# Patient Record
Sex: FEMALE | Race: WHITE | NOT HISPANIC OR LATINO | Employment: FULL TIME | URBAN - METROPOLITAN AREA
[De-identification: names, ages, dates, MRNs, and addresses within clinical notes are randomized per-mention and may not be internally consistent; named-entity substitution may affect disease eponyms.]

---

## 2017-05-15 ENCOUNTER — OFFICE VISIT (OUTPATIENT)
Dept: FAMILY MEDICINE | Facility: CLINIC | Age: 27
End: 2017-05-15
Payer: COMMERCIAL

## 2017-05-15 VITALS
DIASTOLIC BLOOD PRESSURE: 87 MMHG | HEART RATE: 70 BPM | TEMPERATURE: 98 F | WEIGHT: 162.6 LBS | OXYGEN SATURATION: 97 % | BODY MASS INDEX: 24.08 KG/M2 | HEIGHT: 69 IN | SYSTOLIC BLOOD PRESSURE: 132 MMHG

## 2017-05-15 DIAGNOSIS — Z00.00 ROUTINE GENERAL MEDICAL EXAMINATION AT A HEALTH CARE FACILITY: Primary | ICD-10-CM

## 2017-05-15 DIAGNOSIS — Z23 NEED FOR VACCINATION: ICD-10-CM

## 2017-05-15 DIAGNOSIS — Z11.3 SCREENING EXAMINATION FOR VENEREAL DISEASE: ICD-10-CM

## 2017-05-15 DIAGNOSIS — Z12.4 SCREENING FOR MALIGNANT NEOPLASM OF CERVIX: ICD-10-CM

## 2017-05-15 DIAGNOSIS — Z13.1 SCREENING FOR DIABETES MELLITUS: ICD-10-CM

## 2017-05-15 DIAGNOSIS — Z13.220 SCREENING FOR LIPID DISORDERS: ICD-10-CM

## 2017-05-15 DIAGNOSIS — Z30.09 FAMILY PLANNING: ICD-10-CM

## 2017-05-15 LAB
CHOLEST SERPL-MCNC: 211 MG/DL
GLUCOSE SERPL-MCNC: 95 MG/DL (ref 70–99)
HDLC SERPL-MCNC: 84 MG/DL
LDLC SERPL CALC-MCNC: 101 MG/DL
NONHDLC SERPL-MCNC: 127 MG/DL
TRIGL SERPL-MCNC: 132 MG/DL

## 2017-05-15 PROCEDURE — 87491 CHLMYD TRACH DNA AMP PROBE: CPT | Performed by: FAMILY MEDICINE

## 2017-05-15 PROCEDURE — 82947 ASSAY GLUCOSE BLOOD QUANT: CPT | Performed by: FAMILY MEDICINE

## 2017-05-15 PROCEDURE — 36415 COLL VENOUS BLD VENIPUNCTURE: CPT | Performed by: FAMILY MEDICINE

## 2017-05-15 PROCEDURE — 80061 LIPID PANEL: CPT | Performed by: FAMILY MEDICINE

## 2017-05-15 PROCEDURE — 99385 PREV VISIT NEW AGE 18-39: CPT | Performed by: FAMILY MEDICINE

## 2017-05-15 PROCEDURE — 87591 N.GONORRHOEAE DNA AMP PROB: CPT | Performed by: FAMILY MEDICINE

## 2017-05-15 RX ORDER — DROSPIRENONE AND ETHINYL ESTRADIOL 0.02-3(28)
KIT ORAL
COMMUNITY
Start: 2017-02-21 | End: 2017-05-15

## 2017-05-15 RX ORDER — DROSPIRENONE AND ETHINYL ESTRADIOL 0.02-3(28)
1 KIT ORAL DAILY
Qty: 90 TABLET | Refills: 3 | Status: SHIPPED | OUTPATIENT
Start: 2017-05-15 | End: 2018-04-24

## 2017-05-15 NOTE — PROGRESS NOTES
SUBJECTIVE:     CC: Beata Calderon is an 26 year old woman who presents for preventive health visit.     Healthy Habits:    Do you get at least three servings of calcium containing foods daily (dairy, green leafy vegetables, etc.)? yes    Amount of exercise or daily activities, outside of work: 3-4 day(s) per week    Problems taking medications regularly No    Medication side effects: No    Have you had an eye exam in the past two years? yes    Do you see a dentist twice per year? yes  Do you have sleep apnea, excessive snoring or daytime drowsiness?no    Refill birth control  Fasting for labs    Today's PHQ-2 Score:   PHQ-2 ( 1999 Pfizer) 5/15/2017   Q1: Little interest or pleasure in doing things 0   Q2: Feeling down, depressed or hopeless 0   PHQ-2 Score 0       Abuse: Current or Past(Physical, Sexual or Emotional)- No  Do you feel safe in your environment - Yes    Social History   Substance Use Topics     Smoking status: Never Smoker     Smokeless tobacco: Never Used     Alcohol use 3.0 oz/week     5 Cans of beer per week     The patient does not drink >3 drinks per day nor >7 drinks per week.    No results for input(s): CHOL, HDL, LDL, TRIG, CHOLHDLRATIO, NHDL in the last 53406 hours.    Reviewed orders with patient.  Reviewed health maintenance and updated orders accordingly - Yes    Mammo Decision Support:  Mammogram not appropriate for this patient based on age.    Pertinent mammograms are reviewed under the imaging tab.  History of abnormal Pap smear: NO - age 21-29 PAP every 3 years recommended    Reviewed and updated as needed this visit by clinical staff  Tobacco  Meds  Soc Hx        Reviewed and updated as needed this visit by Provider            ROS:  C: NEGATIVE for fever, chills, change in weight  I: NEGATIVE for worrisome rashes, moles or lesions  E: NEGATIVE for vision changes or irritation  ENT: NEGATIVE for ear, mouth and throat problems  R: NEGATIVE for significant cough or SOB  B: NEGATIVE for  "masses, tenderness or discharge  CV: NEGATIVE for chest pain, palpitations or peripheral edema  GI: NEGATIVE for nausea, abdominal pain, heartburn, or change in bowel habits  : NEGATIVE for unusual urinary or vaginal symptoms. Periods are regular.  M: NEGATIVE for significant arthralgias or myalgia  N: NEGATIVE for weakness, dizziness or paresthesias  P: NEGATIVE for changes in mood or affect    Problem list, Medication list, Allergies, and Medical/Social/Surgical histories reviewed in [facility] and updated as appropriate.  OBJECTIVE:     /87  Pulse 70  Temp 98  F (36.7  C) (Oral)  Ht 5' 9\" (1.753 m)  Wt 162 lb 9.6 oz (73.8 kg)  LMP 05/10/2017  SpO2 97%  BMI 24.01 kg/m2  EXAM:  GENERAL: healthy, alert and no distress  EYES: Eyes grossly normal to inspection, PERRL and conjunctivae and sclerae normal  HENT: ear canals and TM's normal, nose and mouth without ulcers or lesions  NECK: no adenopathy, no asymmetry, masses, or scars and thyroid normal to palpation  RESP: lungs clear to auscultation - no rales, rhonchi or wheezes  BREAST: normal without masses, tenderness or nipple discharge and no palpable axillary masses or adenopathy  CV: regular rate and rhythm, normal S1 S2, no S3 or S4, no murmur, click or rub, no peripheral edema and peripheral pulses strong  ABDOMEN: soft, nontender, no hepatosplenomegaly, no masses and bowel sounds normal   (female): normal female external genitalia, normal urethral meatus, vaginal mucosa pink, moist, well rugated, and normal cervix/adnexa/uterus without masses or discharge  MS: no gross musculoskeletal defects noted, no edema  SKIN: no suspicious lesions or rashes  NEURO: Normal strength and tone, mentation intact and speech normal  PSYCH: mentation appears normal, affect normal/bright    ASSESSMENT/PLAN:     (Z00.00) Routine general medical examination at a health care facility  (primary encounter diagnosis)  Comment: Plan: fasting for labs, fasting glucose , fasting " "lipid   Please see patient instructions     (Z11.3) Screening examination for venereal disease  Comment: Plan: NEISSERIA GONORRHOEA PCR, CHLAMYDIA TRACHOMATIS        PCR            (Z12.4) Screening for malignant neoplasm of cervix  Comment: Plan: - [facility]IL-pap at [facility], 04/2015    (Z30.09) Family planning  Comment: Plan: oral contraceptive pills refilled    (Z23) Need for vaccination  Comment: Plan: reviewed at [facility]    COUNSELING:   Reviewed preventive health counseling, as reflected in patient instructions       Regular exercise    BP Screening:   Last 3 BP Readings:    BP Readings from Last 3 Encounters:   05/15/17 132/87       The following was recommended to the patient:  Re-screen BP within a year and recommended lifestyle modifications     reports that she has never smoked. She has never used smokeless tobacco.    Estimated body mass index is 24.01 kg/(m^2) as calculated from the following:    Height as of this encounter: 5' 9\" (1.753 m).    Weight as of this encounter: 162 lb 9.6 oz (73.8 kg).       Counseling Resources:  ATP IV Guidelines  Pooled Cohorts Equation Calculator  Breast Cancer Risk Calculator  FRAX Risk Assessment  ICSI Preventive Guidelines  Dietary Guidelines for Americans, 2010  USDA's MyPlate  ASA Prophylaxis  Lung CA Screening    Serina Munguia MD  [facility]  "

## 2017-05-15 NOTE — NURSING NOTE
"Chief Complaint   Patient presents with     Physical     fasting     /87  Pulse 70  Temp 98  F (36.7  C) (Oral)  Ht 5' 9\" (1.753 m)  Wt 162 lb 9.6 oz (73.8 kg)  LMP 05/10/2017  SpO2 97%  BMI 24.01 kg/m2 Estimated body mass index is 24.01 kg/(m^2) as calculated from the following:    Height as of this encounter: 5' 9\" (1.753 m).    Weight as of this encounter: 162 lb 9.6 oz (73.8 kg).  BP completed using cuff size: regular       Health Maintenance due pending provider review:  Pap Smear    PAP--Possibly completing today, per Provider review      Humberto Salgado CMA  "

## 2017-05-15 NOTE — PATIENT INSTRUCTIONS
-Normal is 119/79 or lower  -Blood pressure between 120-139/80-89 (prehypertensive blood pressure)   -Hypertensive is 140/90 or above and needs treatment with medication.  -Recheck  Blood pressure periodically,  if More than 140/90 Return visit with MD.   -to reach goal normal Blood pressure , follow:  -Progressive daily aerobic exercise program, most days of the week  -Follow a low fat, low cholesterol diet, attempt to lose weight    -Reduce salt in diet and cooking.  -Reduce coffee to 1-2 cups a day, less is more            Preventive Health Recommendations  Female Ages 18 to 25     Yearly exam:     See your health care provider every year in order to  o Review health changes.   o Discuss preventive care.    o Review your medicines if your doctor has prescribed any.      You should be tested each year for STDs (sexually transmitted diseases).       After age 20, talk to your provider about how often you should have cholesterol testing.      Starting at age 21, get a Pap test every three years. If you have an abnormal result, your doctor may have you test more often.      If you are at risk for diabetes, you should have a diabetes test (fasting glucose).     Shots:     Get a flu shot each year.     Get a tetanus shot every 10 years.     Consider getting the shot (vaccine) that prevents cervical cancer (Gardasil).    Nutrition:     Eat at least 5 servings of fruits and vegetables each day.    Eat whole-grain bread, whole-wheat pasta and brown rice instead of white grains and rice.    Talk to your provider about Calcium and Vitamin D.     Lifestyle    Exercise at least 150 minutes a week each week (30 minutes a day, 5 days a week). This will help you control your weight and prevent disease.    Limit alcohol to one drink per day.    No smoking.     Wear sunscreen to prevent skin cancer.    See your dentist every six months for an exam and cleaning.

## 2017-05-15 NOTE — MR AVS SNAPSHOT
After Visit Summary   5/15/2017    Beata Calderon    MRN: [id number]           Patient Information     Date Of Birth          1990        Visit Information        Provider Department      5/15/2017 8:30 AM Serina Munguia MD [facility]        Today's Diagnoses     Routine general medical examination at a health care facility    -  1    Screening examination for venereal disease        Screening for malignant neoplasm of cervix        Family planning        Need for vaccination          Care Instructions      -Normal is 119/79 or lower  -Blood pressure between 120-139/80-89 (prehypertensive blood pressure)   -Hypertensive is 140/90 or above and needs treatment with medication.  -Recheck  Blood pressure periodically,  if More than 140/90 Return visit with MD.   -to reach goal normal Blood pressure , follow:  -Progressive daily aerobic exercise program, most days of the week  -Follow a low fat, low cholesterol diet, attempt to lose weight    -Reduce salt in diet and cooking.  -Reduce coffee to 1-2 cups a day, less is more            Preventive Health Recommendations  Female Ages 18 to 25     Yearly exam:     See your health care provider every year in order to  o Review health changes.   o Discuss preventive care.    o Review your medicines if your doctor has prescribed any.      You should be tested each year for STDs (sexually transmitted diseases).       After age 20, talk to your provider about how often you should have cholesterol testing.      Starting at age 21, get a Pap test every three years. If you have an abnormal result, your doctor may have you test more often.      If you are at risk for diabetes, you should have a diabetes test (fasting glucose).     Shots:     Get a flu shot each year.     Get a tetanus shot every 10 years.     Consider getting the shot (vaccine) that prevents cervical cancer (Gardasil).    Nutrition:     Eat at least 5 servings of fruits and vegetables  "each day.    Eat whole-grain bread, whole-wheat pasta and brown rice instead of white grains and rice.    Talk to your provider about Calcium and Vitamin D.     Lifestyle    Exercise at least 150 minutes a week each week (30 minutes a day, 5 days a week). This will help you control your weight and prevent disease.    Limit alcohol to one drink per day.    No smoking.     Wear sunscreen to prevent skin cancer.    See your dentist every six months for an exam and cleaning.        Follow-ups after your visit        Follow-up notes from your care team     Return in about 1 year (around 5/15/2018).      Who to contact     If you have questions or need follow up information about today's clinic visit or your schedule please contact [facility] directly at [phone].  Normal or non-critical lab and imaging results will be communicated to you by MyChart, letter or phone within 4 business days after the clinic has received the results. If you do not hear from us within 7 days, please contact the clinic through Mobivoxhart or phone. If you have a critical or abnormal lab result, we will notify you by phone as soon as possible.  Submit refill requests through [employer] or call your pharmacy and they will forward the refill request to us. Please allow 3 business days for your refill to be completed.          Additional Information About Your Visit        [employer] Information     WorldRemit lets you send messages to your doctor, view your test results, renew your prescriptions, schedule appointments and more. To sign up, go to [email or web address] . Click on \"Log in\" on the left side of the screen, which will take you to the Welcome page. Then click on \"Sign up Now\" on the right side of the page.     You will be asked to enter the access code listed below, as well as some personal information. Please follow the directions to create your username and password.     Your access code is: [email or web address]  Expires: 8/13/2017  8:58 " "AM     Your access code will  in 90 days. If you need help or a new code, please call your [facility] clinic or [phone].        Care EveryWhere ID     This is your Care EveryWhere ID. This could be used by other organizations to access your [facility] medical records  [id number]        Your Vitals Were     Pulse Temperature Height Last Period Pulse Oximetry BMI (Body Mass Index)    70 98  F (36.7  C) (Oral) 5' 9\" (1.753 m) 05/10/2017 97% 24.01 kg/m2       Blood Pressure from Last 3 Encounters:   05/15/17 132/87    Weight from Last 3 Encounters:   05/15/17 162 lb 9.6 oz (73.8 kg)              We Performed the Following     CHLAMYDIA TRACHOMATIS PCR     NEISSERIA GONORRHOEA PCR          Today's Medication Changes          These changes are accurate as of: 5/15/17  8:58 AM.  If you have any questions, ask your nurse or doctor.               These medicines have changed or have updated prescriptions.        Dose/Directions    KINDRA 3-0.02 MG per tablet   This may have changed:    - how much to take  - how to take this  - when to take this   Used for:  Family planning   Generic drug:  drospirenone-ethinyl estradiol   Changed by:  Serina Munguia MD        Dose:  1 tablet   Take 1 tablet by mouth daily   Quantity:  90 tablet   Refills:  3            Where to get your medicines      These medications were sent to [facility] [id number] [place] [address] & [address]  [address] [place] [id number]     Phone:  [phone]     KINDRA 3-0.02 MG per tablet                Primary Care Provider    None Specified       No primary provider on file.        Thank you!     Thank you for choosing [facility]  for your care. Our goal is always to provide you with excellent care. Hearing back from our patients is one way we can continue to improve our services. Please take a few minutes to complete the written survey that you may receive in the mail after your visit " with us. Thank you!             Your Updated Medication List - Protect others around you: Learn how to safely use, store and throw away your medicines at www.disposemymeds.org.          This list is accurate as of: 5/15/17  8:58 AM.  Always use your most recent med list.                   Brand Name Dispense Instructions for use    KINDRA 3-0.02 MG per tablet   Generic drug:  drospirenone-ethinyl estradiol     90 tablet    Take 1 tablet by mouth daily

## 2017-05-16 LAB
C TRACH DNA SPEC QL NAA+PROBE: NORMAL
N GONORRHOEA DNA SPEC QL NAA+PROBE: NORMAL
SPECIMEN SOURCE: NORMAL
SPECIMEN SOURCE: NORMAL

## 2017-05-16 NOTE — PROGRESS NOTES
Send lab & letter      Negative chlamydia & gonorrhea  Normal fasting glucose- db screening- that's good  Total cholesterol slightly high- rest is ok  -LDL(bad) cholesterol level is elevated,  A diet high in fat and simple carbohydrates, genetics and being overweight can contribute to this. ADVISE: Exercise, a low fat, low carbohydrate diet and weight control are helpful to improve this.  Rechecking your fasting cholesterol panel in 12 months is recommended (Lipid w/ LDL reflex, DX:hyperlipidemia)    Please keep us posted with questions or concerns .      Best Regards,    Serina Munguia MD  [facility]  [phone]

## 2017-06-14 ENCOUNTER — OFFICE VISIT (OUTPATIENT)
Dept: URGENT CARE | Facility: URGENT CARE | Age: 27
End: 2017-06-14
Payer: COMMERCIAL

## 2017-06-14 VITALS
BODY MASS INDEX: 24.37 KG/M2 | TEMPERATURE: 97.8 F | SYSTOLIC BLOOD PRESSURE: 156 MMHG | WEIGHT: 165 LBS | HEART RATE: 76 BPM | DIASTOLIC BLOOD PRESSURE: 94 MMHG

## 2017-06-14 DIAGNOSIS — R30.0 DYSURIA: ICD-10-CM

## 2017-06-14 DIAGNOSIS — R82.90 NONSPECIFIC FINDING ON EXAMINATION OF URINE: ICD-10-CM

## 2017-06-14 DIAGNOSIS — N39.0 ACUTE UTI: Primary | ICD-10-CM

## 2017-06-14 LAB
ALBUMIN UR-MCNC: NEGATIVE MG/DL
APPEARANCE UR: CLEAR
BACTERIA #/AREA URNS HPF: ABNORMAL /HPF
BILIRUB UR QL STRIP: NEGATIVE
COLOR UR AUTO: YELLOW
GLUCOSE UR STRIP-MCNC: NEGATIVE MG/DL
HGB UR QL STRIP: ABNORMAL
KETONES UR STRIP-MCNC: NEGATIVE MG/DL
LEUKOCYTE ESTERASE UR QL STRIP: ABNORMAL
NITRATE UR QL: NEGATIVE
PH UR STRIP: 7.5 PH (ref 5–7)
RBC #/AREA URNS AUTO: ABNORMAL /HPF (ref 0–2)
SP GR UR STRIP: 1.01 (ref 1–1.03)
URN SPEC COLLECT METH UR: ABNORMAL
UROBILINOGEN UR STRIP-ACNC: 0.2 EU/DL (ref 0.2–1)
WBC #/AREA URNS AUTO: ABNORMAL /HPF (ref 0–2)

## 2017-06-14 PROCEDURE — 81001 URINALYSIS AUTO W/SCOPE: CPT | Performed by: PHYSICIAN ASSISTANT

## 2017-06-14 PROCEDURE — 87086 URINE CULTURE/COLONY COUNT: CPT | Performed by: FAMILY MEDICINE

## 2017-06-14 PROCEDURE — 99213 OFFICE O/P EST LOW 20 MIN: CPT | Performed by: PHYSICIAN ASSISTANT

## 2017-06-14 RX ORDER — PHENAZOPYRIDINE HYDROCHLORIDE 100 MG/1
100 TABLET, FILM COATED ORAL 3 TIMES DAILY PRN
Qty: 12 TABLET | Refills: 0 | Status: SHIPPED | OUTPATIENT
Start: 2017-06-14 | End: 2018-05-16

## 2017-06-14 RX ORDER — SULFAMETHOXAZOLE/TRIMETHOPRIM 800-160 MG
1 TABLET ORAL 2 TIMES DAILY
Qty: 14 TABLET | Refills: 0 | Status: SHIPPED | OUTPATIENT
Start: 2017-06-14 | End: 2017-10-31

## 2017-06-14 NOTE — PROGRESS NOTES
SUBJECTIVE:   Beata Calderon is a 26 year old female who  presents today for a possible UTI. Symptoms of dysuria, urgency and frequency have been going on for 2day(s).  Hematuria no.  sudden onsetand moderate.  There is no history of fever, chills, nausea or vomiting.  No history of vaginal or penile discharge. This patient does have a history of urinary tract infections. Patient denies long duration, rigors, flank pain, temperature > 101 degrees F. and Vomiting, significant nausea or diarrhea     No past medical history on file.  Patient Active Problem List   Diagnosis     Screening for malignant neoplasm of cervix     Family planning     Social History   Substance Use Topics     Smoking status: Never Smoker     Smokeless tobacco: Never Used     Alcohol use 3.0 oz/week     5 Cans of beer per week       ROS:   CONSTITUTIONAL:NEGATIVE for fever, chills, change in weight  INTEGUMENTARY/SKIN: NEGATIVE for worrisome rashes, moles or lesions  : as per HPI    OBJECTIVE:  BP (!) 156/94 (BP Location: Right arm, Patient Position: Chair, Cuff Size: Adult Regular)  Pulse 76  Temp 97.8  F (36.6  C) (Oral)  Wt 165 lb (74.8 kg)  LMP 05/10/2017  BMI 24.37 kg/m2  GENERAL APPEARANCE: healthy, alert and no distress  ABDOMEN:  soft, nontender, no HSM or masses and bowel sounds normal  BACK: No CVA tenderness    ASSESSMENT:   Lower, uncomplicated urinary tract infection.    PLAN:  As per ordered above  Drink plenty of fluids.  Prevention and treatment of UTI's discussed.Signs and symptoms of pyelonephritis mentioned.  Follow up with primary care physician if not improving

## 2017-06-14 NOTE — NURSING NOTE
"Chief Complaint   Patient presents with     Urinary Problem     dysuria,frequency for 2 days       Initial BP (!) 156/94 (BP Location: Right arm, Patient Position: Chair, Cuff Size: Adult Regular)  Pulse 76  Temp 97.8  F (36.6  C) (Oral)  Wt 165 lb (74.8 kg)  LMP 05/10/2017  BMI 24.37 kg/m2 Estimated body mass index is 24.37 kg/(m^2) as calculated from the following:    Height as of 5/15/17: 5' 9\" (1.753 m).    Weight as of this encounter: 165 lb (74.8 kg).  Medication Reconciliation: complete   Stephanie VALDEZ    "

## 2017-06-14 NOTE — LETTER
[facility] [place]  [address] [address]  [phone]      June 14, 2017    RE:  Beata Calderon                                                                                                                                                       [address]  [place] [address]            To whom it may concern:    Beata Calderon was seen in clinic today.          Sincerely,        Felisha Marte PAC    [facility]

## 2017-06-14 NOTE — MR AVS SNAPSHOT
After Visit Summary   6/14/2017    Beata Calderon    MRN: [id number]           Patient Information     Date Of Birth          1990        Visit Information        Provider Department      6/14/2017 2:20 PM Felisha Crandall PA-C [facility]        Today's Diagnoses     Acute UTI    -  1    Dysuria        Nonspecific finding on examination of urine           Follow-ups after your visit        Who to contact     If you have questions or need follow up information about today's clinic visit or your schedule please contact [facility] directly at [phone].  Normal or non-critical lab and imaging results will be communicated to you by ROBAUTOhart, letter or phone within 4 business days after the clinic has received the results. If you do not hear from us within 7 days, please contact the clinic through ROBAUTOhart or phone. If you have a critical or abnormal lab result, we will notify you by phone as soon as possible.  Submit refill requests through [employer] or call your pharmacy and they will forward the refill request to us. Please allow 3 business days for your refill to be completed.          Additional Information About Your Visit        MyChart Information     [employer] gives you secure access to your electronic health record. If you see a primary care provider, you can also send messages to your care team and make appointments. If you have questions, please call your primary care clinic.  If you do not have a primary care provider, please call [phone] and they will assist you.        Care EveryWhere ID     This is your Care EveryWhere ID. This could be used by other organizations to access your [place] medical records  [id number]        Your Vitals Were     Pulse Temperature Last Period BMI (Body Mass Index)          76 97.8  F (36.6  C) (Oral) 05/10/2017 24.37 kg/m2         Blood Pressure from Last 3 Encounters:   06/14/17 (!) 156/94    05/15/17 132/87    Weight from Last 3 Encounters:   06/14/17 165 lb (74.8 kg)   05/15/17 162 lb 9.6 oz (73.8 kg)              We Performed the Following     UA with Microscopic reflex to Culture     Urine Culture Aerobic Bacterial          Today's Medication Changes          These changes are accurate as of: 6/14/17  2:57 PM.  If you have any questions, ask your nurse or doctor.               Start taking these medicines.        Dose/Directions    phenazopyridine 100 MG tablet   Commonly known as:  PYRIDIUM   Used for:  Acute UTI   Started by:  Felisha Crandall PA-C        Dose:  100 mg   Take 1 tablet (100 mg) by mouth 3 times daily as needed   Quantity:  12 tablet   Refills:  0       sulfamethoxazole-trimethoprim 800-160 MG per tablet   Commonly known as:  BACTRIM DS/SEPTRA DS   Used for:  Acute UTI   Started by:  Felisha Crandall PA-C        Dose:  1 tablet   Take 1 tablet by mouth 2 times daily   Quantity:  14 tablet   Refills:  0            Where to get your medicines      These medications were sent to [facility] [address] [place] [address] & [facility]  [address] [place] [id number]     Phone:  [phone]     phenazopyridine 100 MG tablet    sulfamethoxazole-trimethoprim 800-160 MG per tablet                Primary Care Provider    None Specified       No primary provider on file.        Thank you!     Thank you for choosing [facility]  for your care. Our goal is always to provide you with excellent care. Hearing back from our patients is one way we can continue to improve our services. Please take a few minutes to complete the written survey that you may receive in the mail after your visit with us. Thank you!             Your Updated Medication List - Protect others around you: Learn how to safely use, store and throw away your medicines at www.disposemymeds.org.          This list is accurate as of: 6/14/17   2:57 PM.  Always use your most recent med list.                   Brand Name Dispense Instructions for use    KINDRA 3-0.02 MG per tablet   Generic drug:  drospirenone-ethinyl estradiol     90 tablet    Take 1 tablet by mouth daily       phenazopyridine 100 MG tablet    PYRIDIUM    12 tablet    Take 1 tablet (100 mg) by mouth 3 times daily as needed       sulfamethoxazole-trimethoprim 800-160 MG per tablet    BACTRIM DS/SEPTRA DS    14 tablet    Take 1 tablet by mouth 2 times daily

## 2017-06-15 LAB
BACTERIA SPEC CULT: NORMAL
MICRO REPORT STATUS: NORMAL
SPECIMEN SOURCE: NORMAL

## 2017-06-16 ENCOUNTER — PRE VISIT (OUTPATIENT)
Dept: UROLOGY | Facility: CLINIC | Age: 27
End: 2017-06-16

## 2017-07-03 ENCOUNTER — PRE VISIT (OUTPATIENT)
Dept: UROLOGY | Facility: CLINIC | Age: 27
End: 2017-07-03

## 2017-07-03 NOTE — TELEPHONE ENCOUNTER
Patient with history of UTI coming in for consult with Dr. Graves. Patient chart reviewed, no need for call, all records available and ready for appointment.

## 2017-07-11 ASSESSMENT — ENCOUNTER SYMPTOMS: DYSURIA: 1

## 2017-07-12 ENCOUNTER — OFFICE VISIT (OUTPATIENT)
Dept: UROLOGY | Facility: CLINIC | Age: 27
End: 2017-07-12

## 2017-07-12 VITALS
HEIGHT: 69 IN | WEIGHT: 166 LBS | HEART RATE: 82 BPM | BODY MASS INDEX: 24.59 KG/M2 | DIASTOLIC BLOOD PRESSURE: 77 MMHG | SYSTOLIC BLOOD PRESSURE: 129 MMHG

## 2017-07-12 DIAGNOSIS — N39.0 RECURRENT UTI: Primary | ICD-10-CM

## 2017-07-12 RX ORDER — SULFAMETHOXAZOLE AND TRIMETHOPRIM 400; 80 MG/1; MG/1
1 TABLET ORAL
Qty: 30 TABLET | Refills: 11 | Status: SHIPPED | OUTPATIENT
Start: 2017-07-12 | End: 2018-06-13

## 2017-07-12 ASSESSMENT — PAIN SCALES - GENERAL: PAINLEVEL: NO PAIN (0)

## 2017-07-12 NOTE — PATIENT INSTRUCTIONS
Follow up in 3 months.    It was a pleasure meeting with you today.  Thank you for allowing me and my team the privilege of caring for you today.  YOU are the reason we are here, and I truly hope we provided you with the excellent service you deserve.  Please let us know if there is anything else we can do for you so that we can be sure you are leaving completely satisfied with your care experience.

## 2017-07-12 NOTE — NURSING NOTE
"Chief Complaint   Patient presents with     Consult     Patient with history of UTI coming in for consult with Dr. Graves.        Initial /77 (BP Location: Right arm, Patient Position: Chair)  Pulse 82  Ht 1.753 m (5' 9\")  Wt 75.3 kg (166 lb)  BMI 24.51 kg/m2 Estimated body mass index is 24.51 kg/(m^2) as calculated from the following:    Height as of this encounter: 1.753 m (5' 9\").    Weight as of this encounter: 75.3 kg (166 lb).  Medication Reconciliation: complete     Daija Galo, student    "

## 2017-07-12 NOTE — PROGRESS NOTES
"CC:  Recurrent uti's.    HPI:  Beata Calderon is a 26 year old female new to our clinic for the above.  This has been  going on for years but it has been getting worse as she has started a new relationship and having intercourse.  In fact she finds that they are very related to intercourse.  Her symptoms are dysuria and frequency but no hematuria.  She had a UA on 17 which was grossly positive however her culture was negative.  But she does find that antibiotics resolve her symptoms.  The patient voids qfew hours, nocturia X 0.  She drinks mainly water and occasional soda and coffee.  She denies any dysuria, hematuria, hesitancy, intermittency, feeling of incomplete emptying, or any recent hx of stones.    The patient has no constipation or splinting.  She is sexually active and denies any dyspareunia or pelvic pain.   She denies any vaginal bulge.  She has no neurological or balance problems.     Obstetric Hx:  She is .    Periods are regular     No past medical history on file.    PSHx:  tonsillectomy    Meds, Allergies, FHx and SHx reviewed per nurse's intake note.    Answers for HPI/ROS submitted by the patient on 2017   General Symptoms: No  Skin Symptoms: No  HENT Symptoms: No  EYE SYMPTOMS: No  HEART SYMPTOMS: No  LUNG SYMPTOMS: No  INTESTINAL SYMPTOMS: No  URINARY SYMPTOMS: Yes  GYNECOLOGIC SYMPTOMS: No  BREAST SYMPTOMS: No  SKELETAL SYMPTOMS: No  BLOOD SYMPTOMS: No  NERVOUS SYSTEM SYMPTOMS: No  MENTAL HEALTH SYMPTOMS: No  Pain or burning: Yes  Increased frequency of urination: Yes      PEx:   Blood pressure 129/77, pulse 82, height 1.753 m (5' 9\"), weight 75.3 kg (166 lb).  5' 9\", Body mass index is 24.51 kg/(m^2)., 166 lbs 0 oz  Gen appearance:  Well groomed  HEENT:  EOMI, AT NC  Psych:  Normal Affect  Neuro:  A/O X 3  Skin:  Warm to touch  Resp:  No increased respiratory effort  Vasc:  RRR  lymph:  No LE edema  Musk:  Full ROM in extremities  :  deferred    UA: UA RESULTS:  Recent Labs   Lab " Test  06/14/17   1434   COLOR  Yellow   APPEARANCE  Clear   URINEGLC  Negative   URINEBILI  Negative   URINEKETONE  Negative   SG  1.010   UBLD  Small*   URINEPH  7.5*   PROTEIN  Negative   UROBILINOGEN  0.2   NITRITE  Negative   LEUKEST  Large*   RBCU  5-10  5-10  *   WBCU  >100  >100  *       Office Visit on 06/14/2017   Component Date Value Ref Range Status     Color Urine 06/14/2017 Yellow   Final     Appearance Urine 06/14/2017 Clear   Final     Glucose Urine 06/14/2017 Negative  NEG mg/dL Final     Bilirubin Urine 06/14/2017 Negative  NEG Final     Ketones Urine 06/14/2017 Negative  NEG mg/dL Final     Specific Gravity Urine 06/14/2017 1.010  1.003 - 1.035 Final     pH Urine 06/14/2017 7.5* 5.0 - 7.0 pH Final     Protein Albumin Urine 06/14/2017 Negative  NEG mg/dL Final     Urobilinogen Urine 06/14/2017 0.2  0.2 - 1.0 EU/dL Final     Nitrite Urine 06/14/2017 Negative  NEG Final     Blood Urine 06/14/2017 Small* NEG Final     Leukocyte Esterase Urine 06/14/2017 Large* NEG Final     Source 06/14/2017 Midstream Urine   Final     WBC Urine 06/14/2017 * 0 - 2 /HPF Final                    Value:>100  >100       RBC Urine 06/14/2017 * 0 - 2 /HPF Final                    Value:5-10  5-10       Bacteria Urine 06/14/2017 * NEG /HPF Final                    Value:Few  Few       Specimen Description 06/14/2017 Midstream Urine   Final     Culture Micro 06/14/2017 <10,000 colonies/mL urogenital sudha Susceptibility testing not routinely done   Final     Micro Report Status 06/14/2017 FINAL 06/15/2017   Final       ASSESSMENT and PLAN:  This is a 26 year old female with recurrent uti's related to intercourse.  Different management options were discussed with the patient including observation, prophylactic antibiotics daily or around the time of intercourse and she would like to try taking them around the time of intercourse.    -bactrim ss charlie-coital  -f/u in 3 months to reassess    Thank you for allowing me to  participate in Ms. Kvng's care.  I will keep you updated on her progress.    Malik Graves MD

## 2017-07-12 NOTE — LETTER
"2017       RE: Beata Cadleron  [address]  [place] [address]     Dear Colleague,    Thank you for referring your patient, Beata Calderon, to the [facility] UROLOGY AND INST FOR PROSTATE AND UROLOGIC CANCERS at [facility]. Please see a copy of my visit note below.    CC:  Recurrent uti's.    HPI:  Beata Calderon is a 26 year old female new to our clinic for the above.  This has been  going on for years but it has been getting worse as she has started a new relationship and having intercourse.  In fact she finds that they are very related to intercourse.  Her symptoms are dysuria and frequency but no hematuria.  She had a UA on 17 which was grossly positive however her culture was negative.  But she does find that antibiotics resolve her symptoms.  The patient voids qfew hours, nocturia X 0.  She drinks mainly water and occasional soda and coffee.  She denies any dysuria, hematuria, hesitancy, intermittency, feeling of incomplete emptying, or any recent hx of stones.    The patient has no constipation or splinting.  She is sexually active and denies any dyspareunia or pelvic pain.   She denies any vaginal bulge.  She has no neurological or balance problems.     Obstetric Hx:  She is .    Periods are regular     No past medical history on file.    PSHx:  tonsillectomy    Meds, Allergies, FHx and SHx reviewed per nurse's intake note.    Answers for HPI/ROS submitted by the patient on 2017   General Symptoms: No  Skin Symptoms: No  HENT Symptoms: No  EYE SYMPTOMS: No  HEART SYMPTOMS: No  LUNG SYMPTOMS: No  INTESTINAL SYMPTOMS: No  URINARY SYMPTOMS: Yes  GYNECOLOGIC SYMPTOMS: No  BREAST SYMPTOMS: No  SKELETAL SYMPTOMS: No  BLOOD SYMPTOMS: No  NERVOUS SYSTEM SYMPTOMS: No  MENTAL HEALTH SYMPTOMS: No  Pain or burning: Yes  Increased frequency of urination: Yes      PEx:   Blood pressure 129/77, pulse 82, height 1.753 m (5' 9\"), weight 75.3 kg (166 lb).  5' 9\", " Body mass index is 24.51 kg/(m^2)., 166 lbs 0 oz  Gen appearance:  Well groomed  HEENT:  EOMI, AT NC  Psych:  Normal Affect  Neuro:  A/O X 3  Skin:  Warm to touch  Resp:  No increased respiratory effort  Vasc:  RRR  lymph:  No LE edema  Musk:  Full ROM in extremities  :  deferred    UA: UA RESULTS:  Recent Labs   Lab Test  06/14/17   1434   COLOR  Yellow   APPEARANCE  Clear   URINEGLC  Negative   URINEBILI  Negative   URINEKETONE  Negative   SG  1.010   UBLD  Small*   URINEPH  7.5*   PROTEIN  Negative   UROBILINOGEN  0.2   NITRITE  Negative   LEUKEST  Large*   RBCU  5-10  5-10  *   WBCU  >100  >100  *       Office Visit on 06/14/2017   Component Date Value Ref Range Status     Color Urine 06/14/2017 Yellow   Final     Appearance Urine 06/14/2017 Clear   Final     Glucose Urine 06/14/2017 Negative  NEG mg/dL Final     Bilirubin Urine 06/14/2017 Negative  NEG Final     Ketones Urine 06/14/2017 Negative  NEG mg/dL Final     Specific Gravity Urine 06/14/2017 1.010  1.003 - 1.035 Final     pH Urine 06/14/2017 7.5* 5.0 - 7.0 pH Final     Protein Albumin Urine 06/14/2017 Negative  NEG mg/dL Final     Urobilinogen Urine 06/14/2017 0.2  0.2 - 1.0 EU/dL Final     Nitrite Urine 06/14/2017 Negative  NEG Final     Blood Urine 06/14/2017 Small* NEG Final     Leukocyte Esterase Urine 06/14/2017 Large* NEG Final     Source 06/14/2017 Midstream Urine   Final     WBC Urine 06/14/2017 * 0 - 2 /HPF Final                    Value:>100  >100       RBC Urine 06/14/2017 * 0 - 2 /HPF Final                    Value:5-10  5-10       Bacteria Urine 06/14/2017 * NEG /HPF Final                    Value:Few  Few       Specimen Description 06/14/2017 Midstream Urine   Final     Culture Micro 06/14/2017 <10,000 colonies/mL urogenital sudha Susceptibility testing not routinely done   Final     Micro Report Status 06/14/2017 FINAL 06/15/2017   Final       ASSESSMENT and PLAN:  This is a 26 year old female with recurrent uti's related to  intercourse.  Different management options were discussed with the patient including observation, prophylactic antibiotics daily or around the time of intercourse and she would like to try taking them around the time of intercourse.    -bactrim ss charlie-coital  -f/u in 3 months to reassess    Thank you for allowing me to participate in Ms. Calderon's care.  I will keep you updated on her progress.    Malik Graves MD

## 2017-07-12 NOTE — MR AVS SNAPSHOT
After Visit Summary   7/12/2017    Beata Calderon    MRN: [id number]           Patient Information     Date Of Birth          1990        Visit Information        Provider Department      7/12/2017 2:30 PM Malik Graves MD [facility] and [facility] for Prostate and Urologic Cancers        Today's Diagnoses     Recurrent UTI    -  1       Follow-ups after your visit        Follow-up notes from your care team     Return in about 3 months (around 10/12/2017).      Who to contact     Please call your clinic at [phone] to:    Ask questions about your health    Make or cancel appointments    Discuss your medicines    Learn about your test results    Speak to your doctor   If you have compliments or concerns about an experience at your clinic, or if you wish to file a complaint, please contact [facility] Patient Relations at [phone] or email us at [email or web address]         Additional Information About Your Visit        MyChart Information     [employer] gives you secure access to your electronic health record. If you see a primary care provider, you can also send messages to your care team and make appointments. If you have questions, please call your primary care clinic.  If you do not have a primary care provider, please call [phone] and they will assist you.      Key Ring is an electronic gateway that provides easy, online access to your medical records. With Key Ring, you can request a clinic appointment, read your test results, renew a prescription or communicate with your care team.     To access your existing account, please contact your [facility] or call [phone] for assistance.        Care EveryWhere ID     This is your Care EveryWhere ID. This could be used by other organizations to access your [place] medical records  [id number]        Your Vitals Were     Pulse Height BMI (Body Mass Index)              "82 1.753 m (5' 9\") 24.51 kg/m2          Blood Pressure from Last 3 Encounters:   07/12/17 129/77   06/14/17 (!) 156/94   05/15/17 132/87    Weight from Last 3 Encounters:   07/12/17 75.3 kg (166 lb)   06/14/17 74.8 kg (165 lb)   05/15/17 73.8 kg (162 lb 9.6 oz)              Today, you had the following     No orders found for display         Today's Medication Changes          These changes are accurate as of: 7/12/17  3:37 PM.  If you have any questions, ask your nurse or doctor.               These medicines have changed or have updated prescriptions.        Dose/Directions    * sulfamethoxazole-trimethoprim 800-160 MG per tablet   Commonly known as:  BACTRIM DS/SEPTRA DS   This may have changed:  Another medication with the same name was added. Make sure you understand how and when to take each.   Used for:  Acute UTI   Changed by:  Felisha Crandall PA-C        Dose:  1 tablet   Take 1 tablet by mouth 2 times daily   Quantity:  14 tablet   Refills:  0       * sulfamethoxazole-trimethoprim 400-80 MG per tablet   Commonly known as:  BACTRIM/SEPTRA   This may have changed:  You were already taking a medication with the same name, and this prescription was added. Make sure you understand how and when to take each.   Used for:  Recurrent UTI   Changed by:  Malik Graves MD        Dose:  1 tablet   Take 1 tablet by mouth nightly as needed   Quantity:  30 tablet   Refills:  11       * Notice:  This list has 2 medication(s) that are the same as other medications prescribed for you. Read the directions carefully, and ask your doctor or other care provider to review them with you.         Where to get your medicines      These medications were sent to [facility] [address] [place] [address] & Market  [address] [place] [id number]     Phone:  [phone]     sulfamethoxazole-trimethoprim 400-80 MG per tablet                Primary Care Provider    None " Specified       No primary provider on file.        Equal Access to Services     [facility] HospitalBENJAMIN : Hadii aad ku hadtiffanynidia Camiali, wamarvinda isaaklaiha, qabora giacomoguzmanniharika alan. So [facility] [phone].    ATENCIÓN: Si habla español, tiene a baron disposición servicios gratuitos de asistencia lingüística. Llame al [phone].    We comply with applicable federal civil rights laws and [place] laws. We do not discriminate on the basis of race, color, national origin, age, disability sex, sexual orientation or gender identity.            Thank you!     Thank you for choosing [facility] UROLOGY AND [facility] FOR PROSTATE AND UROLOGIC CANCERS  for your care. Our goal is always to provide you with excellent care. Hearing back from our patients is one way we can continue to improve our services. Please take a few minutes to complete the written survey that you may receive in the mail after your visit with us. Thank you!             Your Updated Medication List - Protect others around you: Learn how to safely use, store and throw away your medicines at www.disposemymeds.org.          This list is accurate as of: 7/12/17  3:37 PM.  Always use your most recent med list.                   Brand Name Dispense Instructions for use Diagnosis    KINDRA 3-0.02 MG per tablet   Generic drug:  drospirenone-ethinyl estradiol     90 tablet    Take 1 tablet by mouth daily    Family planning       phenazopyridine 100 MG tablet    PYRIDIUM    12 tablet    Take 1 tablet (100 mg) by mouth 3 times daily as needed    Acute UTI       * sulfamethoxazole-trimethoprim 800-160 MG per tablet    BACTRIM DS/SEPTRA DS    14 tablet    Take 1 tablet by mouth 2 times daily    Acute UTI       * sulfamethoxazole-trimethoprim 400-80 MG per tablet    BACTRIM/SEPTRA    30 tablet    Take 1 tablet by mouth nightly as needed    Recurrent UTI       * Notice:  This list has 2 medication(s) that are the same as other medications prescribed  for you. Read the directions carefully, and ask your doctor or other care provider to review them with you.

## 2017-10-31 ENCOUNTER — OFFICE VISIT (OUTPATIENT)
Dept: URGENT CARE | Facility: URGENT CARE | Age: 27
End: 2017-10-31
Payer: COMMERCIAL

## 2017-10-31 VITALS
SYSTOLIC BLOOD PRESSURE: 120 MMHG | HEART RATE: 76 BPM | BODY MASS INDEX: 24.51 KG/M2 | RESPIRATION RATE: 16 BRPM | TEMPERATURE: 98.1 F | WEIGHT: 166 LBS | DIASTOLIC BLOOD PRESSURE: 90 MMHG

## 2017-10-31 DIAGNOSIS — R30.0 DYSURIA: Primary | ICD-10-CM

## 2017-10-31 LAB
ALBUMIN UR-MCNC: NEGATIVE MG/DL
APPEARANCE UR: CLEAR
BACTERIA #/AREA URNS HPF: ABNORMAL /HPF
BILIRUB UR QL STRIP: NEGATIVE
COLOR UR AUTO: YELLOW
GLUCOSE UR STRIP-MCNC: NEGATIVE MG/DL
HGB UR QL STRIP: ABNORMAL
KETONES UR STRIP-MCNC: NEGATIVE MG/DL
LEUKOCYTE ESTERASE UR QL STRIP: ABNORMAL
NITRATE UR QL: NEGATIVE
NON-SQ EPI CELLS #/AREA URNS LPF: ABNORMAL /LPF
PH UR STRIP: 5.5 PH (ref 5–7)
RBC #/AREA URNS AUTO: ABNORMAL /HPF
SOURCE: ABNORMAL
SP GR UR STRIP: <=1.005 (ref 1–1.03)
UROBILINOGEN UR STRIP-ACNC: 0.2 EU/DL (ref 0.2–1)
WBC #/AREA URNS AUTO: ABNORMAL /HPF

## 2017-10-31 PROCEDURE — 99213 OFFICE O/P EST LOW 20 MIN: CPT | Performed by: PHYSICIAN ASSISTANT

## 2017-10-31 PROCEDURE — 81001 URINALYSIS AUTO W/SCOPE: CPT | Performed by: PHYSICIAN ASSISTANT

## 2017-10-31 RX ORDER — NITROFURANTOIN 25; 75 MG/1; MG/1
100 CAPSULE ORAL 2 TIMES DAILY
Qty: 14 CAPSULE | Refills: 0 | Status: SHIPPED | OUTPATIENT
Start: 2017-10-31 | End: 2018-05-16

## 2017-10-31 NOTE — NURSING NOTE
"Chief Complaint   Patient presents with     UTI     Pt c/o possible UTI sxs which started on Sunday night. Pt takes one sulgameth/trimeth antibiotic after sexual intercourse to prevent a UTI which did not work this time.     Urgent Care       Initial /90  Pulse 76  Temp 98.1  F (36.7  C)  Resp 16  Wt 166 lb (75.3 kg)  BMI 24.51 kg/m2 Estimated body mass index is 24.51 kg/(m^2) as calculated from the following:    Height as of 7/12/17: 5' 9\" (1.753 m).    Weight as of this encounter: 166 lb (75.3 kg).  Medication Reconciliation: complete    "

## 2017-10-31 NOTE — MR AVS SNAPSHOT
After Visit Summary   10/31/2017    Beata Calderon    MRN: [id number]           Patient Information     Date Of Birth          1990        Visit Information        Provider Department      10/31/2017 1:10 PM Antonio Obregon PA-C [facility]        Today's Diagnoses     Dysuria    -  1       Follow-ups after your visit        Who to contact     If you have questions or need follow up information about today's clinic visit or your schedule please contact [facility] directly at [phone].  Normal or non-critical lab and imaging results will be communicated to you by Ed[employer]art, letter or phone within 4 business days after the clinic has received the results. If you do not hear from us within 7 days, please contact the clinic through [employer] or phone. If you have a critical or abnormal lab result, we will notify you by phone as soon as possible.  Submit refill requests through [employer] or call your pharmacy and they will forward the refill request to us. Please allow 3 business days for your refill to be completed.          Additional Information About Your Visit        MyChart Information     [employer] gives you secure access to your electronic health record. If you see a primary care provider, you can also send messages to your care team and make appointments. If you have questions, please call your primary care clinic.  If you do not have a primary care provider, please call [phone] and they will assist you.        Care EveryWhere ID     This is your Care EveryWhere ID. This could be used by other organizations to access your [place] medical records  [id number]        Your Vitals Were     Pulse Temperature Respirations BMI (Body Mass Index)          76 98.1  F (36.7  C) 16 24.51 kg/m2         Blood Pressure from Last 3 Encounters:   10/31/17 120/90   07/12/17 129/77   06/14/17 (!) 156/94    Weight from Last 3 Encounters:   10/31/17 166 lb  (75.3 kg)   07/12/17 166 lb (75.3 kg)   06/14/17 165 lb (74.8 kg)              We Performed the Following     UA with Microscopic reflex to Culture          Today's Medication Changes          These changes are accurate as of: 10/31/17  1:56 PM.  If you have any questions, ask your nurse or doctor.               Start taking these medicines.        Dose/Directions    nitroFURantoin (macrocrystal-monohydrate) 100 MG capsule   Commonly known as:  MACROBID   Used for:  Dysuria   Started by:  Antonio Obregon PA-C        Dose:  100 mg   Take 1 capsule (100 mg) by mouth 2 times daily   Quantity:  14 capsule   Refills:  0            Where to get your medicines      These medications were sent to [facility] [address] [place] [address] & Market  [address] [place] [phone]     Phone:  [phone]     nitroFURantoin (macrocrystal-monohydrate) 100 MG capsule                Primary Care Provider    Physician No Ref-Primary       NO REF-PRIMARY PHYSICIAN        Equal Access to Services     CHAVEZ WOLF : Hadii gillian mcelroy hadasho Soomaali, waaxda luqadaha, qaybta kaalmada adeegyahannah, niharika li . So [facility] [phone].    ATENCIÓN: Si habla español, tiene a baron disposición servicios gratuitos de asistencia lingüística. Llame al [phone].    We comply with applicable federal civil rights laws and [place] laws. We do not discriminate on the basis of race, color, national origin, age, disability, sex, sexual orientation, or gender identity.            Thank you!     Thank you for choosing [facility]  for your care. Our goal is always to provide you with excellent care. Hearing back from our patients is one way we can continue to improve our services. Please take a few minutes to complete the written survey that you may receive in the mail after your visit with us. Thank you!             Your Updated Medication List -  Protect others around you: Learn how to safely use, store and throw away your medicines at www.disposemymeds.org.          This list is accurate as of: 10/31/17  1:56 PM.  Always use your most recent med list.                   Brand Name Dispense Instructions for use Diagnosis    KINDRA 3-0.02 MG per tablet   Generic drug:  drospirenone-ethinyl estradiol     90 tablet    Take 1 tablet by mouth daily    Family planning       nitroFURantoin (macrocrystal-monohydrate) 100 MG capsule    MACROBID    14 capsule    Take 1 capsule (100 mg) by mouth 2 times daily    Dysuria       phenazopyridine 100 MG tablet    PYRIDIUM    12 tablet    Take 1 tablet (100 mg) by mouth 3 times daily as needed    Acute UTI       sulfamethoxazole-trimethoprim 400-80 MG per tablet    BACTRIM/SEPTRA    30 tablet    Take 1 tablet by mouth nightly as needed    Recurrent UTI

## 2017-10-31 NOTE — PROGRESS NOTES
SUBJECTIVE:   Beata Calderon is a 27 year old female who  presents today for a possible UTI. Symptoms of dysuria, urgency and frequency have been going on for 2day(s).  Hematuria no.  sudden onsetand mild.  There is no history of fever, chills, nausea or vomiting.   This patient does  have a history of urinary tract infections. Patient denies long duration, rigors, flank pain, temperature > 101 degrees F. and Vomiting, significant nausea or diarrhea or vaginal discharge     No past medical history on file.     Allergies   Allergen Reactions     Cephalosporins Nausea and Vomiting     Social History   Substance Use Topics     Smoking status: Never Smoker     Smokeless tobacco: Never Used     Alcohol use 3.0 oz/week     5 Cans of beer per week       ROS:   CONSTITUTIONAL:NEGATIVE for fever, chills, change in weight  INTEGUMENTARY/SKIN: NEGATIVE for worrisome rashes, moles or lesions  GI: NEGATIVE for nausea, abdominal pain, heartburn, or change in bowel habits  : dysuria, frequency  and hematuria  MUSCULOSKELETAL: NEGATIVE for significant arthralgias or myalgia  NEURO: NEGATIVE for weakness, dizziness or paresthesias    OBJECTIVE:  /90  Pulse 76  Temp 98.1  F (36.7  C)  Resp 16  Wt 166 lb (75.3 kg)  BMI 24.51 kg/m2  GENERAL APPEARANCE: healthy, alert and no distress  RESP: lungs clear to auscultation - no rales, rhonchi or wheezes  CV: regular rates and rhythm, normal S1 S2, no murmur noted  ABDOMEN:  soft, nontender, no HSM or masses and bowel sounds normal  BACK: No CVA tenderness  SKIN: no suspicious lesions or rashes    Results for orders placed or performed in visit on 10/31/17   UA with Microscopic reflex to Culture   Result Value Ref Range    Color Urine Yellow     Appearance Urine Clear     Glucose Urine Negative NEG^Negative mg/dL    Bilirubin Urine Negative NEG^Negative    Ketones Urine Negative NEG^Negative mg/dL    Specific Gravity Urine <=1.005 1.003 - 1.035    pH Urine 5.5 5.0 - 7.0 pH    Protein  Albumin Urine Negative NEG^Negative mg/dL    Urobilinogen Urine 0.2 0.2 - 1.0 EU/dL    Nitrite Urine Negative NEG^Negative    Blood Urine Trace (A) NEG^Negative    Leukocyte Esterase Urine Trace (A) NEG^Negative    Source Midstream Urine     WBC Urine 2-5 (A) OTO2^O - 2 /HPF    RBC Urine O - 2 OTO2^O - 2 /HPF    Squamous Epithelial /LPF Urine Few FEW^Few /LPF    Bacteria Urine Few (A) NEG^Negative /HPF       ASSESSMENT:   Lower, uncomplicated urinary tract infection.    PLAN:  Orders Placed This Encounter     UA with Microscopic reflex to Culture     nitroFURantoin, macrocrystal-monohydrate, (MACROBID) 100 MG capsule       Urine culture pending  Drink plenty of fluids.  Prevention and treatment of UTI's discussed.Signs and symptoms of pyelonephritis mentioned.    Follow up with primary care physician if not improving

## 2017-11-08 ENCOUNTER — TELEPHONE (OUTPATIENT)
Dept: URGENT CARE | Facility: URGENT CARE | Age: 27
End: 2017-11-08

## 2017-11-08 NOTE — TELEPHONE ENCOUNTER
Reason for Call:  Other call back    Detailed comments: still having symptoms    Phone Number Patient can be reached at: Home number on file [phone] (home)    Best Time: any    Can we leave a detailed message on this number? YES    Call taken on 11/8/2017 at 4:52 PM by Patricia Petty

## 2017-11-09 NOTE — TELEPHONE ENCOUNTER
Pt has been notified to follow up with pcp/clinic per 10/31/2017 doctor visit note.    Please Agreed.

## 2018-01-18 ENCOUNTER — OFFICE VISIT (OUTPATIENT)
Dept: URGENT CARE | Facility: URGENT CARE | Age: 28
End: 2018-01-18
Payer: COMMERCIAL

## 2018-01-18 VITALS
SYSTOLIC BLOOD PRESSURE: 124 MMHG | HEART RATE: 96 BPM | TEMPERATURE: 97.9 F | RESPIRATION RATE: 16 BRPM | WEIGHT: 163.3 LBS | BODY MASS INDEX: 24.12 KG/M2 | DIASTOLIC BLOOD PRESSURE: 64 MMHG

## 2018-01-18 DIAGNOSIS — R30.0 DYSURIA: ICD-10-CM

## 2018-01-18 DIAGNOSIS — N39.0 ACUTE UTI: Primary | ICD-10-CM

## 2018-01-18 LAB
ALBUMIN UR-MCNC: NEGATIVE MG/DL
APPEARANCE UR: CLEAR
BACTERIA #/AREA URNS HPF: ABNORMAL /HPF
BILIRUB UR QL STRIP: NEGATIVE
COLOR UR AUTO: YELLOW
GLUCOSE UR STRIP-MCNC: NEGATIVE MG/DL
HGB UR QL STRIP: ABNORMAL
KETONES UR STRIP-MCNC: NEGATIVE MG/DL
LEUKOCYTE ESTERASE UR QL STRIP: ABNORMAL
NITRATE UR QL: NEGATIVE
NON-SQ EPI CELLS #/AREA URNS LPF: ABNORMAL /LPF
PH UR STRIP: 5 PH (ref 5–7)
RBC #/AREA URNS AUTO: ABNORMAL /HPF
SOURCE: ABNORMAL
SP GR UR STRIP: <=1.005 (ref 1–1.03)
UROBILINOGEN UR STRIP-ACNC: 0.2 EU/DL (ref 0.2–1)
WBC #/AREA URNS AUTO: ABNORMAL /HPF

## 2018-01-18 PROCEDURE — 81001 URINALYSIS AUTO W/SCOPE: CPT | Performed by: FAMILY MEDICINE

## 2018-01-18 PROCEDURE — 87086 URINE CULTURE/COLONY COUNT: CPT | Performed by: PHYSICIAN ASSISTANT

## 2018-01-18 PROCEDURE — 99213 OFFICE O/P EST LOW 20 MIN: CPT | Performed by: PHYSICIAN ASSISTANT

## 2018-01-18 RX ORDER — SULFAMETHOXAZOLE/TRIMETHOPRIM 800-160 MG
1 TABLET ORAL 2 TIMES DAILY
Qty: 14 TABLET | Refills: 0 | Status: SHIPPED | OUTPATIENT
Start: 2018-01-18 | End: 2018-05-16

## 2018-01-18 RX ORDER — PHENAZOPYRIDINE HYDROCHLORIDE 200 MG/1
200 TABLET, FILM COATED ORAL 3 TIMES DAILY PRN
Qty: 6 TABLET | Refills: 0 | Status: SHIPPED | OUTPATIENT
Start: 2018-01-18 | End: 2018-05-16

## 2018-01-18 NOTE — PATIENT INSTRUCTIONS
(N39.0) Acute UTI  (primary encounter diagnosis)  Comment:   Plan: Urine Culture Aerobic Bacterial,         sulfamethoxazole-trimethoprim (BACTRIM         DS/SEPTRA DS) 800-160 MG per tablet,         phenazopyridine (PYRIDIUM) 200 MG tablet            (R30.0) Dysuria  Comment:   Plan: UA with Microscopic reflex to Culture          Take a probiotic while on antibiotic.  Increase water intake.    Follow up with primary clinic should symptoms persist or worsen.

## 2018-01-18 NOTE — PROGRESS NOTES
SUBJECTIVE:   Beata Calderon is a 27 year old female who  presents today for a possible UTI. Symptoms of frequency have been going on for 5day(s).  Hematuria no.  gradual onset and worsening in the past 24 hours.  She has had UTI's in the past and this is consistent with her UTI symptoms.    There is no history of fever, chills, nausea or vomiting.  No history of unusual vaginal discharge. This patient does  have a history of urinary tract infections. Patient denies long duration, rigors, flank pain, temperature > 101 degrees F. and Vomiting, significant nausea or diarrhea.      No past medical history on file.  Patient Active Problem List   Diagnosis     Screening for malignant neoplasm of cervix     Family planning     Social History   Substance Use Topics     Smoking status: Never Smoker     Smokeless tobacco: Never Used     Alcohol use 3.0 oz/week     5 Cans of beer per week       ROS:   CONSTITUTIONAL:NEGATIVE for fever, chills, change in weight  INTEGUMENTARY/SKIN: NEGATIVE for worrisome rashes, moles or lesions  GI: NEGATIVE for nausea, abdominal pain, heartburn, or change in bowel habits  : as per HPI  MUSCULOSKELETAL: NEGATIVE for significant arthralgias or myalgia    OBJECTIVE:  /64  Pulse 96  Temp 97.9  F (36.6  C) (Oral)  Resp 16  Wt 163 lb 4.8 oz (74.1 kg)  BMI 24.12 kg/m2  GENERAL APPEARANCE: healthy, alert and no distress  ABDOMEN:  soft, nontender, no HSM or masses and bowel sounds normal  BACK: No CVA tenderness  SKIN: no suspicious lesions or rashes    ASSESSMENT:   Lower, uncomplicated urinary tract infection.    PLAN:  SEPTRA  PYRIDIUM  As per ordered above  Drink plenty of fluids.  Prevention and treatment of UTI's discussed.Signs and symptoms of pyelonephritis mentioned.  Follow up with primary care physician if not improving

## 2018-01-18 NOTE — MR AVS SNAPSHOT
After Visit Summary   1/18/2018    Beata Calderon    MRN: [id number]           Patient Information     Date Of Birth          1990        Visit Information        Provider Department      1/18/2018 5:10 PM Felisha Crandall PA-C [facility]        Today's Diagnoses     Acute UTI    -  1    Dysuria          Care Instructions    (N39.0) Acute UTI  (primary encounter diagnosis)  Comment:   Plan: Urine Culture Aerobic Bacterial,         sulfamethoxazole-trimethoprim (BACTRIM         DS/SEPTRA DS) 800-160 MG per tablet,         phenazopyridine (PYRIDIUM) 200 MG tablet            (R30.0) Dysuria  Comment:   Plan: UA with Microscopic reflex to Culture          Take a probiotic while on antibiotic.  Increase water intake.    Follow up with primary clinic should symptoms persist or worsen.                Follow-ups after your visit        Who to contact     If you have questions or need follow up information about today's clinic visit or your schedule please contact [facility] directly at [phone].  Normal or non-critical lab and imaging results will be communicated to you by Xiamhart, letter or phone within 4 business days after the clinic has received the results. If you do not hear from us within 7 days, please contact the clinic through [employer] or phone. If you have a critical or abnormal lab result, we will notify you by phone as soon as possible.  Submit refill requests through [employer] or call your pharmacy and they will forward the refill request to us. Please allow 3 business days for your refill to be completed.          Additional Information About Your Visit        Xiamhart Information     Class[employer] gives you secure access to your electronic health record. If you see a primary care provider, you can also send messages to your care team and make appointments. If you have questions, please call your primary care clinic.  If you do not  have a primary care provider, please call [phone] and they will assist you.        Care EveryWhere ID     This is your Care EveryWhere ID. This could be used by other organizations to access your [place] medical records  [id number]        Your Vitals Were     Pulse Temperature Respirations BMI (Body Mass Index)          96 97.9  F (36.6  C) (Oral) 16 24.12 kg/m2         Blood Pressure from Last 3 Encounters:   01/18/18 124/64   10/31/17 120/90   07/12/17 129/77    Weight from Last 3 Encounters:   01/18/18 163 lb 4.8 oz (74.1 kg)   10/31/17 166 lb (75.3 kg)   07/12/17 166 lb (75.3 kg)              We Performed the Following     UA with Microscopic reflex to Culture     Urine Culture Aerobic Bacterial          Today's Medication Changes          These changes are accurate as of: 1/18/18  5:49 PM.  If you have any questions, ask your nurse or doctor.               These medicines have changed or have updated prescriptions.        Dose/Directions    * phenazopyridine 100 MG tablet   Commonly known as:  PYRIDIUM   This may have changed:  Another medication with the same name was added. Make sure you understand how and when to take each.   Used for:  Acute UTI   Changed by:  Felisha Crandall PA-C        Dose:  100 mg   Take 1 tablet (100 mg) by mouth 3 times daily as needed   Quantity:  12 tablet   Refills:  0       * phenazopyridine 200 MG tablet   Commonly known as:  PYRIDIUM   This may have changed:  You were already taking a medication with the same name, and this prescription was added. Make sure you understand how and when to take each.   Used for:  Acute UTI   Changed by:  Felisha Crandall PA-C        Dose:  200 mg   Take 1 tablet (200 mg) by mouth 3 times daily as needed for irritation   Quantity:  6 tablet   Refills:  0       * sulfamethoxazole-trimethoprim 400-80 MG per tablet   Commonly known as:  BACTRIM/SEPTRA   This may have changed:  Another medication with the same name was  added. Make sure you understand how and when to take each.   Used for:  Recurrent UTI   Changed by:  Malik Graves MD        Dose:  1 tablet   Take 1 tablet by mouth nightly as needed   Quantity:  30 tablet   Refills:  11       * sulfamethoxazole-trimethoprim 800-160 MG per tablet   Commonly known as:  BACTRIM DS/SEPTRA DS   This may have changed:  You were already taking a medication with the same name, and this prescription was added. Make sure you understand how and when to take each.   Used for:  Acute UTI   Changed by:  Felisha Crandall PA-C        Dose:  1 tablet   Take 1 tablet by mouth 2 times daily   Quantity:  14 tablet   Refills:  0       * Notice:  This list has 4 medication(s) that are the same as other medications prescribed for you. Read the directions carefully, and ask your doctor or other care provider to review them with you.         Where to get your medicines      These medications were sent to [facility] [address] [place] [address] & [address]  [address] [place] [id number]     Phone:  [phone]     phenazopyridine 200 MG tablet    sulfamethoxazole-trimethoprim 800-160 MG per tablet                Primary Care Provider Office Phone # Fax #    Serina Munguia [phone] [address] [facility] [address]        Equal Access to Services     ISH WOLF AH: Hadii gillian mcelroy hadasho Soomaali, waaxda luqadaha, qaybta kaalmada adeegyada, niharika otero. So [facility] [phone].    ATENCIÓN: Si habla español, tiene a baron disposición servicios gratuitos de asistencia lingüística. Llame al [phone].    We comply with applicable federal civil rights laws and [place] laws. We do not discriminate on the basis of race, color, national origin, age, disability, sex, sexual orientation, or gender identity.            Thank you!     Thank you for choosing [facility]  [facility]  for your care. Our goal is always to provide you with excellent care. Hearing back from our patients is one way we can continue to improve our services. Please take a few minutes to complete the written survey that you may receive in the mail after your visit with us. Thank you!             Your Updated Medication List - Protect others around you: Learn how to safely use, store and throw away your medicines at www.disposemymeds.org.          This list is accurate as of: 1/18/18  5:49 PM.  Always use your most recent med list.                   Brand Name Dispense Instructions for use Diagnosis    KINDRA 3-0.02 MG per tablet   Generic drug:  drospirenone-ethinyl estradiol     90 tablet    Take 1 tablet by mouth daily    Family planning       nitroFURantoin (macrocrystal-monohydrate) 100 MG capsule    MACROBID    14 capsule    Take 1 capsule (100 mg) by mouth 2 times daily    Dysuria       * phenazopyridine 100 MG tablet    PYRIDIUM    12 tablet    Take 1 tablet (100 mg) by mouth 3 times daily as needed    Acute UTI       * phenazopyridine 200 MG tablet    PYRIDIUM    6 tablet    Take 1 tablet (200 mg) by mouth 3 times daily as needed for irritation    Acute UTI       * sulfamethoxazole-trimethoprim 400-80 MG per tablet    BACTRIM/SEPTRA    30 tablet    Take 1 tablet by mouth nightly as needed    Recurrent UTI       * sulfamethoxazole-trimethoprim 800-160 MG per tablet    BACTRIM DS/SEPTRA DS    14 tablet    Take 1 tablet by mouth 2 times daily    Acute UTI       * Notice:  This list has 4 medication(s) that are the same as other medications prescribed for you. Read the directions carefully, and ask your doctor or other care provider to review them with you.

## 2018-01-20 LAB
BACTERIA SPEC CULT: NO GROWTH
SPECIMEN SOURCE: NORMAL

## 2018-04-24 DIAGNOSIS — Z30.09 FAMILY PLANNING: ICD-10-CM

## 2018-04-24 NOTE — TELEPHONE ENCOUNTER
Reason for call:  Medication   If this is a refill request, has the caller requested the refill from the pharmacy already? No  Will the patient be using a [facility]? No  Name of the pharmacy and phone number for the current request: [facility] [address] [place], [place] - [address] [facility] & [address] [phone]    Name of the medication requested: Soha    Other request: Patient will run out of this medication before her physical that is scheduled on 05/16/18. Can she get a 1 month refill to last her until her appointment?    Phone number to reach patient:  Home number on file [phone] (home)    Best Time:  anytime    Can we leave a detailed message on this number?  YES     Luzmaria ALLEN  Central Scheduler

## 2018-04-24 NOTE — TELEPHONE ENCOUNTER
"Requested Prescriptions   Pending Prescriptions Disp Refills     KINDRA 3-0.02 MG per tablet 90 tablet 3     Sig: Take 1 tablet by mouth daily    Contraceptives Protocol Passed    4/24/2018  6:35 PM       Passed - Patient is not a current smoker if age is 35 or older       Passed - Recent (12 mo) or future (30 days) visit within the authorizing provider's specialty    Patient had office visit in the last 12 months or has a visit in the next 30 days with authorizing provider or within the authorizing provider's specialty.  See \"Patient Info\" tab in inbasket, or \"Choose Columns\" in Meds & Orders section of the refill encounter.           Passed - No active pregnancy on record       Passed - No positive pregnancy test in past 12 months        "

## 2018-04-25 RX ORDER — DROSPIRENONE AND ETHINYL ESTRADIOL 0.02-3(28)
1 KIT ORAL DAILY
Qty: 30 TABLET | Refills: 0 | Status: SHIPPED | OUTPATIENT
Start: 2018-04-25 | End: 2018-05-16

## 2018-04-25 NOTE — TELEPHONE ENCOUNTER
Medication is being filled for 1 time refill only due to:  Patient needs to be seen because it has been more than one year since last visit.   Apt scheduled for next week.  Jocelyne Laguerre RN

## 2018-05-16 ENCOUNTER — OFFICE VISIT (OUTPATIENT)
Dept: FAMILY MEDICINE | Facility: CLINIC | Age: 28
End: 2018-05-16
Payer: COMMERCIAL

## 2018-05-16 VITALS
BODY MASS INDEX: 23.57 KG/M2 | TEMPERATURE: 98.8 F | WEIGHT: 159.1 LBS | RESPIRATION RATE: 20 BRPM | OXYGEN SATURATION: 98 % | HEIGHT: 69 IN | DIASTOLIC BLOOD PRESSURE: 81 MMHG | HEART RATE: 74 BPM | SYSTOLIC BLOOD PRESSURE: 121 MMHG

## 2018-05-16 DIAGNOSIS — Z00.00 ROUTINE GENERAL MEDICAL EXAMINATION AT A HEALTH CARE FACILITY: Primary | ICD-10-CM

## 2018-05-16 DIAGNOSIS — Z11.51 SCREENING FOR HPV (HUMAN PAPILLOMAVIRUS): ICD-10-CM

## 2018-05-16 DIAGNOSIS — Z12.4 SCREENING FOR MALIGNANT NEOPLASM OF CERVIX: ICD-10-CM

## 2018-05-16 DIAGNOSIS — Z30.09 FAMILY PLANNING: ICD-10-CM

## 2018-05-16 PROCEDURE — G0145 SCR C/V CYTO,THINLAYER,RESCR: HCPCS | Performed by: FAMILY MEDICINE

## 2018-05-16 PROCEDURE — 87624 HPV HI-RISK TYP POOLED RSLT: CPT | Performed by: FAMILY MEDICINE

## 2018-05-16 PROCEDURE — 99395 PREV VISIT EST AGE 18-39: CPT | Performed by: FAMILY MEDICINE

## 2018-05-16 PROCEDURE — G0124 SCREEN C/V THIN LAYER BY MD: HCPCS | Performed by: FAMILY MEDICINE

## 2018-05-16 RX ORDER — DROSPIRENONE AND ETHINYL ESTRADIOL 0.02-3(28)
1 KIT ORAL DAILY
Qty: 90 TABLET | Refills: 3 | Status: SHIPPED | OUTPATIENT
Start: 2018-05-16 | End: 2019-04-29

## 2018-05-16 NOTE — MR AVS SNAPSHOT
After Visit Summary   5/16/2018    Beata Calderon    MRN: [id number]           Patient Information     Date Of Birth          1990        Visit Information        Provider Department      5/16/2018 9:00 AM Serina Munguia MD [facility]        Today's Diagnoses     Routine general medical examination at a health care facility    -  1    Screening for malignant neoplasm of cervix        Family planning          Care Instructions      Preventive Health Recommendations  Female Ages 26 - 39  Yearly exam:   See your health care provider every year in order to    Review health changes.     Discuss preventive care.      Review your medicines if you your doctor has prescribed any.    Until age 30: Get a Pap test every three years (more often if you have had an abnormal result).    After age 30: Talk to your doctor about whether you should have a Pap test every 3 years or have a Pap test with HPV screening every 5 years.   You do not need a Pap test if your uterus was removed (hysterectomy) and you have not had cancer.  You should be tested each year for STDs (sexually transmitted diseases), if you're at risk.   Talk to your provider about how often to have your cholesterol checked.  If you are at risk for diabetes, you should have a diabetes test (fasting glucose).  Shots: Get a flu shot each year. Get a tetanus shot every 10 years.   Nutrition:     Eat at least 5 servings of fruits and vegetables each day.    Eat whole-grain bread, whole-wheat pasta and brown rice instead of white grains and rice.    Talk to your provider about Calcium and Vitamin D.     Lifestyle    Exercise at least 150 minutes a week (30 minutes a day, 5 days of the week). This will help you control your weight and prevent disease.    Limit alcohol to one drink per day.    No smoking.     Wear sunscreen to prevent skin cancer.    See your dentist every six months for an exam and cleaning.            Follow-ups after your  "visit        Who to contact     If you have questions or need follow up information about today's clinic visit or your schedule please contact [facility] directly at [phone].  Normal or non-critical lab and imaging results will be communicated to you by MyChart, letter or phone within 4 business days after the clinic has received the results. If you do not hear from us within 7 days, please contact the clinic through MyChart or phone. If you have a critical or abnormal lab result, we will notify you by phone as soon as possible.  Submit refill requests through [employer] or call your pharmacy and they will forward the refill request to us. Please allow 3 business days for your refill to be completed.          Additional Information About Your Visit        PolyServehart Information     [employer] gives you secure access to your electronic health record. If you see a primary care provider, you can also send messages to your care team and make appointments. If you have questions, please call your primary care clinic.  If you do not have a primary care provider, please call [phone] and they will assist you.        Care EveryWhere ID     This is your Care EveryWhere ID. This could be used by other organizations to access your [place] medical records  [id number]        Your Vitals Were     Pulse Temperature Respirations Height Last Period Pulse Oximetry    74 98.8  F (37.1  C) (Oral) 20 5' 9\" (1.753 m) 05/09/2018 98%    BMI (Body Mass Index)                   23.49 kg/m2            Blood Pressure from Last 3 Encounters:   05/16/18 121/81   01/18/18 124/64   10/31/17 120/90    Weight from Last 3 Encounters:   05/16/18 159 lb 1.6 oz (72.2 kg)   01/18/18 163 lb 4.8 oz (74.1 kg)   10/31/17 166 lb (75.3 kg)              We Performed the Following     Pap imaged thin layer screen reflex to HPV if ASCUS - recommend age 25 - 29          Today's Medication Changes          These changes are accurate as of 5/16/18  " 9:39 AM.  If you have any questions, ask your nurse or doctor.               These medicines have changed or have updated prescriptions.        Dose/Directions    sulfamethoxazole-trimethoprim 400-80 MG per tablet   Commonly known as:  BACTRIM/SEPTRA   This may have changed:  Another medication with the same name was removed. Continue taking this medication, and follow the directions you see here.   Used for:  Recurrent UTI   Changed by:  Serina Munguia MD        Dose:  1 tablet   Take 1 tablet by mouth nightly as needed   Quantity:  30 tablet   Refills:  11         Stop taking these medicines if you haven't already. Please contact your care team if you have questions.     nitroFURantoin (macrocrystal-monohydrate) 100 MG capsule   Commonly known as:  MACROBID   Stopped by:  Serina Munguia MD           phenazopyridine 100 MG tablet   Commonly known as:  PYRIDIUM   Stopped by:  Serina Munguia MD           phenazopyridine 200 MG tablet   Commonly known as:  PYRIDIUM   Stopped by:  Serina Munguia MD                Where to get your medicines      These medications were sent to [facility] [address] [place] [address] & [address]  [address] [place] [id number]     Phone:  [phone]     KINDRA 3-0.02 MG per tablet                Primary Care Provider Office Phone # Fax #    Serina Munguia [phone] [address] [facility] [place] [address]        Equal Access to Services     [facility] AH: Olivia ricoo Sosara, waaxda luqadaha, qaybta kaalmada hope, niharika otero. So [facility] [phone].    ATENCIÓN: Si habla español, tiene a baron disposición servicios gratuitos de asistencia lingüística. Llame al [phone].    We comply with applicable federal civil rights laws and [place] laws. We do not discriminate on the basis of race, color, national origin, age, disability, sex, sexual  orientation, or gender identity.            Thank you!     Thank you for choosing [facility]  for your care. Our goal is always to provide you with excellent care. Hearing back from our patients is one way we can continue to improve our services. Please take a few minutes to complete the written survey that you may receive in the mail after your visit with us. Thank you!             Your Updated Medication List - Protect others around you: Learn how to safely use, store and throw away your medicines at www.disposemymeds.org.          This list is accurate as of 5/16/18  9:39 AM.  Always use your most recent med list.                   Brand Name Dispense Instructions for use Diagnosis    KINDRA 3-0.02 MG per tablet   Generic drug:  drospirenone-ethinyl estradiol     90 tablet    Take 1 tablet by mouth daily    Family planning       sulfamethoxazole-trimethoprim 400-80 MG per tablet    BACTRIM/SEPTRA    30 tablet    Take 1 tablet by mouth nightly as needed    Recurrent UTI

## 2018-05-16 NOTE — NURSING NOTE
"Chief Complaint   Patient presents with     Physical     /81  Pulse 74  Temp 98.8  F (37.1  C) (Oral)  Resp 20  Ht 5' 9\" (1.753 m)  Wt 159 lb 1.6 oz (72.2 kg)  LMP 05/09/2018  SpO2 98%  BMI 23.49 kg/m2 Estimated body mass index is 23.49 kg/(m^2) as calculated from the following:    Height as of this encounter: 5' 9\" (1.753 m).    Weight as of this encounter: 159 lb 1.6 oz (72.2 kg).        Health Maintenance due pending provider review:  Pap Smear    PAP--Possibly completing today, per Provider review    Bibi Begum CMA  "

## 2018-05-16 NOTE — PROGRESS NOTES
SUBJECTIVE:   CC: Beata Calderon is an 27 year old woman who presents for preventive health visit.     Physical   Annual:     Getting at least 3 servings of Calcium per day::  Yes    Bi-annual eye exam::  Yes    Dental care twice a year::  Yes    Sleep apnea or symptoms of sleep apnea::  None    Diet::  Regular (no restrictions) and Other    Frequency of exercise::  4-5 days/week    Duration of exercise::  30-45 minutes    Taking medications regularly::  Yes    Medication side effects::  None    Additional concerns today::  No                PROBLEMS TO ADD ON...    Today's PHQ-2 Score:   PHQ-2 ( 1999 [employer]) 5/16/2018   Q1: Little interest or pleasure in doing things 0   Q2: Feeling down, depressed or hopeless 0   PHQ-2 Score 0   Q1: Little interest or pleasure in doing things Not at all   Q2: Feeling down, depressed or hopeless Not at all   PHQ-2 Score 0       Abuse: Current or Past(Physical, Sexual or Emotional)- NO  Do you feel safe in your environment - YES    Social History   Substance Use Topics     Smoking status: Never Smoker     Smokeless tobacco: Never Used     Alcohol use 3.0 oz/week     5 Cans of beer per week     Alcohol Use 5/16/2018   If you drink alcohol do you typically have greater than 3 drinks per day OR greater than 7 drinks per week? No   No flowsheet data found.    Reviewed orders with patient.  Reviewed health maintenance and updated orders accordingly - Yes  Labs reviewed in EPIC    Mammogram not appropriate for this patient based on age.    Pertinent mammograms are reviewed under the imaging tab.  History of abnormal Pap smear: NO - age 21-29 PAP every 3 years recommended    Reviewed and updated as needed this visit by clinical staff  Tobacco  Allergies  Meds  Med Hx  Surg Hx  Fam Hx  Soc Hx        Reviewed and updated as needed this visit by Provider            Review of Systems  CONSTITUTIONAL: NEGATIVE for fever, chills, change in weight  INTEGUMENTARU/SKIN: NEGATIVE for worrisome  "rashes, moles or lesions  EYES: NEGATIVE for vision changes or irritation  ENT: NEGATIVE for ear, mouth and throat problems  RESP: NEGATIVE for significant cough or SOB  BREAST: NEGATIVE for masses, tenderness or discharge  CV: NEGATIVE for chest pain, palpitations or peripheral edema  GI: NEGATIVE for nausea, abdominal pain, heartburn, or change in bowel habits  : NEGATIVE for unusual urinary or vaginal symptoms. Periods are regular.  MUSCULOSKELETAL: NEGATIVE for significant arthralgias or myalgia  NEURO: NEGATIVE for weakness, dizziness or paresthesias  PSYCHIATRIC: NEGATIVE for changes in mood or affect     OBJECTIVE:   /81  Pulse 74  Temp 98.8  F (37.1  C) (Oral)  Resp 20  Ht 5' 9\" (1.753 m)  Wt 159 lb 1.6 oz (72.2 kg)  LMP 05/09/2018  SpO2 98%  BMI 23.49 kg/m2  Physical Exam  GENERAL: healthy, alert and no distress  EYES: Eyes grossly normal to inspection, PERRL and conjunctivae and sclerae normal  HENT: ear canals and TM's normal, nose and mouth without ulcers or lesions  NECK: no adenopathy, no asymmetry, masses, or scars and thyroid normal to palpation  RESP: lungs clear to auscultation - no rales, rhonchi or wheezes  BREAST: normal without masses, tenderness or nipple discharge and no palpable axillary masses or adenopathy  CV: regular rate and rhythm, normal S1 S2, no S3 or S4, no murmur, click or rub, no peripheral edema and peripheral pulses strong  ABDOMEN: soft, nontender, no hepatosplenomegaly, no masses and bowel sounds normal   (female): normal female external genitalia, normal urethral meatus, vaginal mucosa pink, moist, well rugated, and normal cervix/adnexa/uterus without masses or discharge  MS: no gross musculoskeletal defects noted, no edema  SKIN: no suspicious lesions or rashes  NEURO: Normal strength and tone, mentation intact and speech normal  PSYCH: mentation appears normal, affect normal/bright    ASSESSMENT/PLAN:   1. Routine general medical examination at a [facility]" "care facility  Please see patient instructions   2. Screening for malignant neoplasm of cervix  - Pap imaged thin layer screen reflex to HPV if ASCUS - recommend age 25 - 29    3. Family planning  - KINDRA 3-0.02 MG per tablet; Take 1 tablet by mouth daily  Dispense: 90 tablet; Refill: 3    Potential medication side effects were discussed with the patient; let me know if any occur.    COUNSELING:  Reviewed preventive health counseling, as reflected in patient instructions       Regular exercise       Healthy diet/nutrition       Contraception       Family planning       Safe sex practices/STD prevention         reports that she has never smoked. She has never used smokeless tobacco.    Estimated body mass index is 23.49 kg/(m^2) as calculated from the following:    Height as of this encounter: 5' 9\" (1.753 m).    Weight as of this encounter: 159 lb 1.6 oz (72.2 kg).       Counseling Resources:  ATP IV Guidelines  Pooled Cohorts Equation Calculator  Breast Cancer Risk Calculator  FRAX Risk Assessment  ICSI Preventive Guidelines  Dietary Guidelines for Americans, 2010  USDA's MyPlate  ASA Prophylaxis  Lung CA Screening    Serina Munguia MD  [facility]  "

## 2018-05-22 LAB
COPATH REPORT: ABNORMAL
PAP: ABNORMAL

## 2018-05-23 ENCOUNTER — RESULT FOLLOW UP (OUTPATIENT)
Dept: FAMILY MEDICINE | Facility: CLINIC | Age: 28
End: 2018-05-23

## 2018-05-23 DIAGNOSIS — R87.610 ASCUS WITH POSITIVE HIGH RISK HPV CERVICAL: ICD-10-CM

## 2018-05-23 DIAGNOSIS — R87.810 ASCUS WITH POSITIVE HIGH RISK HPV CERVICAL: ICD-10-CM

## 2018-05-23 LAB
FINAL DIAGNOSIS: ABNORMAL
HPV HR 12 DNA CVX QL NAA+PROBE: POSITIVE
HPV16 DNA SPEC QL NAA+PROBE: NEGATIVE
HPV18 DNA SPEC QL NAA+PROBE: NEGATIVE
SPECIMEN DESCRIPTION: ABNORMAL
SPECIMEN SOURCE CVX/VAG CYTO: ABNORMAL

## 2018-05-23 NOTE — PROGRESS NOTES
5/16/18 ASCUS, +HR HPV, not 16/18. Plan colp by 8/16/18 5/24/18 Left message to call back. Patient has been notified of results and recommendations.  06/13/18: [place] Bx and ECC neg for dysplasia. Plan cotest in 1 year. Provider's office notified the pt of the results and recommendations. (sk)  05/22/19: NIL Pap, + HR HPV (not 16 or 18) result. Plan [place], due bef 08/22/19. (sk)  05/29/19:Msg left to call back. Pt was advised. (sk)  07/10/19: [place] ECC Benign. Plan Pap in 1 year. Provider informed the pt of the [place] results and recommendations via [employer] (sk)

## 2018-05-28 ENCOUNTER — TRANSFERRED RECORDS (OUTPATIENT)
Dept: HEALTH INFORMATION MANAGEMENT | Facility: CLINIC | Age: 28
End: 2018-05-28

## 2018-06-13 ENCOUNTER — OFFICE VISIT (OUTPATIENT)
Dept: OBGYN | Facility: CLINIC | Age: 28
End: 2018-06-13
Payer: COMMERCIAL

## 2018-06-13 VITALS
BODY MASS INDEX: 23.55 KG/M2 | HEIGHT: 69 IN | WEIGHT: 159 LBS | SYSTOLIC BLOOD PRESSURE: 112 MMHG | DIASTOLIC BLOOD PRESSURE: 70 MMHG

## 2018-06-13 DIAGNOSIS — R87.810 ASCUS WITH POSITIVE HIGH RISK HPV CERVICAL: Primary | ICD-10-CM

## 2018-06-13 DIAGNOSIS — R87.610 ASCUS WITH POSITIVE HIGH RISK HPV CERVICAL: Primary | ICD-10-CM

## 2018-06-13 DIAGNOSIS — Z01.812 PRE-PROCEDURE LAB EXAM: ICD-10-CM

## 2018-06-13 LAB — BETA HCG QUAL IFA URINE: NEGATIVE

## 2018-06-13 PROCEDURE — 84703 CHORIONIC GONADOTROPIN ASSAY: CPT | Performed by: OBSTETRICS & GYNECOLOGY

## 2018-06-13 PROCEDURE — 88305 TISSUE EXAM BY PATHOLOGIST: CPT | Performed by: OBSTETRICS & GYNECOLOGY

## 2018-06-13 PROCEDURE — 57454 BX/CURETT OF CERVIX W/SCOPE: CPT | Performed by: OBSTETRICS & GYNECOLOGY

## 2018-06-13 NOTE — MR AVS SNAPSHOT
"              After Visit Summary   6/13/2018    Beata Calderon    MRN: [id number]           Patient Information     Date Of Birth          1990        Visit Information        Provider Department      6/13/2018 9:45 AM Mary Ann Mercedes MD; WE COLPOSCOPE 1 [facility]        Today's Diagnoses     ASCUS with positive high risk HPV cervical    -  1    Pre-procedure lab exam           Follow-ups after your visit        Who to contact     If you have questions or need follow up information about today's clinic visit or your schedule please contact [facility] directly at [phone].  Normal or non-critical lab and imaging results will be communicated to you by [employer], letter or phone within 4 business days after the clinic has received the results. If you do not hear from us within 7 days, please contact the clinic through [employer] or phone. If you have a critical or abnormal lab result, we will notify you by phone as soon as possible.  Submit refill requests through [employer] or call your pharmacy and they will forward the refill request to us. Please allow 3 business days for your refill to be completed.          Additional Information About Your Visit        MyChart Information     [employer] gives you secure access to your electronic health record. If you see a primary care provider, you can also send messages to your care team and make appointments. If you have questions, please call your primary care clinic.  If you do not have a primary care provider, please call [phone] and they will assist you.        Care EveryWhere ID     This is your Care EveryWhere ID. This could be used by other organizations to access your [facility] medical records  [id number]        Your Vitals Were     Height Last Period BMI (Body Mass Index)             5' 9\" (1.753 m) 06/06/2018 23.48 kg/m2          Blood Pressure from Last 3 Encounters:   06/13/18 112/70   05/16/18 121/81   01/18/18 124/64 "    Weight from Last 3 Encounters:   06/13/18 159 lb (72.1 kg)   05/16/18 159 lb 1.6 oz (72.2 kg)   01/18/18 163 lb 4.8 oz (74.1 kg)              We Performed the Following     Beta HCG Qual, Urine - [facility] and [place] ([id number])     COLPOSCOPY CERVIX/UPPER VAGINA W BX CERVIX     Surgical pathology exam        Primary Care Provider Office Phone # Fax #    Serina Danielle Munguia [phone] [address] [id number]        Equal Access to Services     [facility]: Hadii aad ku hadasho Soomaali, waaxda luqadaha, qaybta kaalmada adealok, nihraika li . So [facility] [phone].    ATENCIÓN: Si habla español, tiene a baron disposición servicios gratuitos de asistencia lingüística. [facility] [phone].    We comply with applicable federal civil rights laws and [place] laws. We do not discriminate on the basis of race, color, national origin, age, disability, sex, sexual orientation, or gender identity.            Thank you!     Thank you for choosing [facility] [place]  for your care. Our goal is always to provide you with excellent care. Hearing back from our patients is one way we can continue to improve our services. Please take a few minutes to complete the written survey that you may receive in the mail after your visit with us. Thank you!             Your Updated Medication List - Protect others around you: Learn how to safely use, store and throw away your medicines at www.disposemymeds.org.          This list is accurate as of 6/13/18 10:31 AM.  Always use your most recent med list.                   Brand Name Dispense Instructions for use Diagnosis    KINDRA 3-0.02 MG per tablet   Generic drug:  drospirenone-ethinyl estradiol     90 tablet    Take 1 tablet by mouth daily    Family planning

## 2018-06-13 NOTE — PROGRESS NOTES
INDICATIONS:                                                        Beata Calderon, is a 27 year old female, who had a recent ASCUS pap, positive other HR-HPV.  No prior history of abnormal pap. Here today for colposcopy. Discussed indication, risks of infection and bleeding.  First abnormal pap  Ascus   And + other type hpv    Her last pap was   Lab Results   Component Value Date    PAP ASC-US, Positive Other HR-HPV 05/16/2018           PROCEDURE:                                                      Cervix is stained with acetic acid and viewed colposcopically. Squamocolumnar junction is visualized in it's entirity. acetowhite lesion(s) noted at 6 o'clock . Biopsy done Yes. Endocervical curretage Done         POST PROCEDURE:                                                      IMPRESSION: Patient tolerated procedure well    PLAN : Await the results of the biopsies.  Repeat pap in 12 months.  She  tolerated the procedure well. There were no complications. Patient was discharged in stable condition.    Patient advised to call the clinic if excessive bleeding, pelvic pain, or fever.     Follow-up plan based on pathology results.    Mary Ann Mercedes MD

## 2018-06-15 LAB — COPATH REPORT: NORMAL

## 2018-07-16 DIAGNOSIS — N39.0 RECURRENT UTI: ICD-10-CM

## 2018-07-16 RX ORDER — SULFAMETHOXAZOLE AND TRIMETHOPRIM 400; 80 MG/1; MG/1
1 TABLET ORAL
Qty: 30 TABLET | Refills: 11 | Status: SHIPPED | OUTPATIENT
Start: 2018-07-16 | End: 2018-08-03

## 2018-07-16 NOTE — TELEPHONE ENCOUNTER
sulfamethoxazole-trimethoprim (BACTRIM/SEPTRA) 400-80 MG per tablet (Discontinued)      Last Written Prescription Date:  7/21/17-6/13/18  Last Fill Quantity: 30,   # refills: 11  Last Office Visit : 7/12/17  Future Office visit:  none    Routing refill request to provider for review/approval because:  Drug not active on patient's medication list  Plan RTC in 3 months- last seen 7/12/17.  *no pending appt.

## 2018-08-03 DIAGNOSIS — N39.0 RECURRENT UTI: ICD-10-CM

## 2018-08-03 RX ORDER — SULFAMETHOXAZOLE AND TRIMETHOPRIM 400; 80 MG/1; MG/1
1 TABLET ORAL
Qty: 30 TABLET | Refills: 3 | Status: SHIPPED | OUTPATIENT
Start: 2018-08-03 | End: 2019-12-11

## 2019-04-29 DIAGNOSIS — Z30.09 FAMILY PLANNING: ICD-10-CM

## 2019-04-30 RX ORDER — DROSPIRENONE AND ETHINYL ESTRADIOL TABLETS 0.02-3(28)
KIT ORAL
Qty: 28 TABLET | Refills: 0 | Status: SHIPPED | OUTPATIENT
Start: 2019-04-30 | End: 2019-05-22

## 2019-04-30 NOTE — TELEPHONE ENCOUNTER
"Loryna   Last Written Prescription Date:  05/16/2018  Last Fill Quantity: 90,  # refills: 3   Last office visit: 5/16/2018 with prescribing provider:  Yes    Future Office Visit:   Next 5 appointments (look out 90 days)    May 22, 2019 11:00 AM CDT  [facility]  [facility] [address] [address] [phone]         Requested Prescriptions   Pending Prescriptions Disp Refills     LORYNA 3-0.02 MG tablet [Pharmacy Med Name: LORYNA 3MG/0.02MG TABLETS 28S] 84 tablet 0     Sig: TAKE 1 TABLET BY MOUTH DAILY       Contraceptives Protocol Passed - 4/29/2019  1:04 PM        Passed - Patient is not a current smoker if age is 35 or older        Passed - Recent (12 mo) or future (30 days) visit within the authorizing provider's specialty     Patient had office visit in the last 12 months or has a visit in the next 30 days with authorizing provider or within the authorizing provider's specialty.  See \"Patient Info\" tab in inbasket, or \"Choose Columns\" in Meds & Orders section of the refill encounter.              Passed - Medication is active on med list        Passed - No active pregnancy on record        Passed - No positive pregnancy test in past 12 months          "

## 2019-05-03 ENCOUNTER — MYC MEDICAL ADVICE (OUTPATIENT)
Dept: FAMILY MEDICINE | Facility: CLINIC | Age: 29
End: 2019-05-03

## 2019-05-22 ENCOUNTER — OFFICE VISIT (OUTPATIENT)
Dept: FAMILY MEDICINE | Facility: CLINIC | Age: 29
End: 2019-05-22
Payer: COMMERCIAL

## 2019-05-22 VITALS
WEIGHT: 158.1 LBS | OXYGEN SATURATION: 96 % | SYSTOLIC BLOOD PRESSURE: 125 MMHG | DIASTOLIC BLOOD PRESSURE: 83 MMHG | HEIGHT: 69 IN | BODY MASS INDEX: 23.42 KG/M2 | HEART RATE: 79 BPM | TEMPERATURE: 98.1 F

## 2019-05-22 DIAGNOSIS — Z00.00 ROUTINE GENERAL MEDICAL EXAMINATION AT A HEALTH CARE FACILITY: Primary | ICD-10-CM

## 2019-05-22 DIAGNOSIS — R87.810 ASCUS WITH POSITIVE HIGH RISK HPV CERVICAL: ICD-10-CM

## 2019-05-22 DIAGNOSIS — Z30.09 FAMILY PLANNING: ICD-10-CM

## 2019-05-22 DIAGNOSIS — F41.9 ANXIETY: ICD-10-CM

## 2019-05-22 DIAGNOSIS — R87.610 ASCUS WITH POSITIVE HIGH RISK HPV CERVICAL: ICD-10-CM

## 2019-05-22 PROCEDURE — 99395 PREV VISIT EST AGE 18-39: CPT | Performed by: FAMILY MEDICINE

## 2019-05-22 PROCEDURE — 87624 HPV HI-RISK TYP POOLED RSLT: CPT | Performed by: FAMILY MEDICINE

## 2019-05-22 PROCEDURE — 88175 CYTOPATH C/V AUTO FLUID REDO: CPT | Performed by: FAMILY MEDICINE

## 2019-05-22 RX ORDER — ESCITALOPRAM OXALATE 10 MG/1
10 TABLET ORAL DAILY
Qty: 30 TABLET | Refills: 11 | Status: SHIPPED | OUTPATIENT
Start: 2019-05-22 | End: 2019-12-11

## 2019-05-22 RX ORDER — DROSPIRENONE AND ETHINYL ESTRADIOL TABLETS 0.02-3(28)
1 KIT ORAL DAILY
Qty: 90 TABLET | Refills: 3 | Status: SHIPPED | OUTPATIENT
Start: 2019-05-22 | End: 2020-04-29

## 2019-05-22 ASSESSMENT — ANXIETY QUESTIONNAIRES
7. FEELING AFRAID AS IF SOMETHING AWFUL MIGHT HAPPEN: SEVERAL DAYS
2. NOT BEING ABLE TO STOP OR CONTROL WORRYING: SEVERAL DAYS
1. FEELING NERVOUS, ANXIOUS, OR ON EDGE: MORE THAN HALF THE DAYS
3. WORRYING TOO MUCH ABOUT DIFFERENT THINGS: MORE THAN HALF THE DAYS
GAD7 TOTAL SCORE: 10
IF YOU CHECKED OFF ANY PROBLEMS ON THIS QUESTIONNAIRE, HOW DIFFICULT HAVE THESE PROBLEMS MADE IT FOR YOU TO DO YOUR WORK, TAKE CARE OF THINGS AT HOME, OR GET ALONG WITH OTHER PEOPLE: SOMEWHAT DIFFICULT
6. BECOMING EASILY ANNOYED OR IRRITABLE: MORE THAN HALF THE DAYS
5. BEING SO RESTLESS THAT IT IS HARD TO SIT STILL: NOT AT ALL

## 2019-05-22 ASSESSMENT — MIFFLIN-ST. JEOR: SCORE: 1512.55

## 2019-05-22 ASSESSMENT — PATIENT HEALTH QUESTIONNAIRE - PHQ9: 5. POOR APPETITE OR OVEREATING: MORE THAN HALF THE DAYS

## 2019-05-22 NOTE — NURSING NOTE
"Chief Complaint   Patient presents with     Physical     /83   Pulse 79   Temp 98.1  F (36.7  C) (Oral)   Ht 1.746 m (5' 8.75\")   Wt 71.7 kg (158 lb 1.6 oz)   LMP 05/08/2019   SpO2 96%   BMI 23.52 kg/m   Estimated body mass index is 23.52 kg/m  as calculated from the following:    Height as of this encounter: 1.746 m (5' 8.75\").    Weight as of this encounter: 71.7 kg (158 lb 1.6 oz).        Health Maintenance due pending provider review:  NONE    n/a    Bibi Begum CMA  "

## 2019-05-22 NOTE — PROGRESS NOTES
SUBJECTIVE:   CC: Beata Calderon is an 28 year old woman who presents for preventive health visit.     Healthy Habits:     Getting at least 3 servings of Calcium per day:  Yes    Bi-annual eye exam:  Yes    Dental care twice a year:  Yes    Sleep apnea or symptoms of sleep apnea:  None    Diet:  Regular (no restrictions) and Breakfast skipped    Frequency of exercise:  4-5 days/week    Duration of exercise:  30-45 minutes    Taking medications regularly:  Yes    Medication side effects:  None    PHQ-2 Total Score: 0    Additional concerns today:  Yes  always been a bit anxious but worsened since mom has been dealing with a lot of pain due to fall and muscle torn, injury & been depressed.  She is at a point where is she often nervous, anxious worrying a lot, trouble relaxing, easily irritable and very anxious.  Did take celexa in college wondering if that caused fatigue orally it was busy katina of college. It did help and probromulo took it for 6 months  & stoppe don own in 2015.  Open to counsleing or and medications  Works out 3 days a week  Works at [employer] and that is also contributing to feeling overwhelemd at time , though loves her work.  She is intersted in trying another medications  An aunty has svere axiety.    Needs refil on oral contraceptive pills  Need repeat pap for high risk human pappiloma virus.  Not concerned about sexually transmitted infections checks       Today's PHQ-2 Score:   PHQ-2 ( 1999 [employer]) 5/21/2019   Q1: Little interest or pleasure in doing things 0   Q2: Feeling down, depressed or hopeless 0   PHQ-2 Score 0   Q1: Little interest or pleasure in doing things Not at all   Q2: Feeling down, depressed or hopeless Not at all   PHQ-2 Score 0       Abuse: Current or Past(Physical, Sexual or Emotional)- NO  Do you feel safe in your environment? YES    Social History     Tobacco Use     Smoking status: Never Smoker     Smokeless tobacco: Never Used   Substance Use Topics     Alcohol use: Yes      "Alcohol/week: 3.0 oz     Types: 5 Cans of beer per week         Alcohol Use 5/21/2019   Prescreen: >3 drinks/day or >7 drinks/week? No   Prescreen: >3 drinks/day or >7 drinks/week? -       Reviewed orders with patient.  Reviewed health maintenance and updated orders accordingly - Yes  BP Readings from Last 3 Encounters:   05/22/19 125/83   06/13/18 112/70   05/16/18 121/81    Wt Readings from Last 3 Encounters:   05/22/19 71.7 kg (158 lb 1.6 oz)   06/13/18 72.1 kg (159 lb)   05/16/18 72.2 kg (159 lb 1.6 oz)                    Mammogram not appropriate for this patient based on age.    Pertinent mammograms are reviewed under the imaging tab.  History of abnormal Pap smear: YES - updated in Problem List and Health Maintenance accordingly  PAP / HPV Latest Ref Rng & Units 5/22/2019 5/16/2018   PAP - NIL ASC-US(A)   HPV 16 DNA NEG:Negative - Negative   HPV 18 DNA NEG:Negative - Negative   OTHER HR HPV NEG:Negative - Positive(A)     Reviewed and updated as needed this visit by clinical staff  Tobacco  Allergies  Meds         Reviewed and updated as needed this visit by Provider            Review of Systems  CONSTITUTIONAL: NEGATIVE for fever, chills, change in weight  INTEGUMENTARU/SKIN: NEGATIVE for worrisome rashes, moles or lesions  EYES: NEGATIVE for vision changes or irritation  ENT: NEGATIVE for ear, mouth and throat problems  RESP: NEGATIVE for significant cough or SOB  BREAST: NEGATIVE for masses, tenderness or discharge  CV: NEGATIVE for chest pain, palpitations or peripheral edema  GI: NEGATIVE for nausea, abdominal pain, heartburn, or change in bowel habits  : NEGATIVE for unusual urinary or vaginal symptoms. Periods are regular.  MUSCULOSKELETAL: NEGATIVE for significant arthralgias or myalgia  NEURO: NEGATIVE for weakness, dizziness or paresthesias  PSYCHIATRIC: NEGATIVE for changes in mood or affect     OBJECTIVE:   /83   Pulse 79   Temp 98.1  F (36.7  C) (Oral)   Ht 1.746 m (5' 8.75\")   Wt " 71.7 kg (158 lb 1.6 oz)   LMP 05/08/2019   SpO2 96%   BMI 23.52 kg/m    Physical Exam  GENERAL: healthy, alert and no distress  EYES: Eyes grossly normal to inspection, PERRL and conjunctivae and sclerae normal  HENT: ear canals and TM's normal, nose and mouth without ulcers or lesions  NECK: no adenopathy, no asymmetry, masses, or scars and thyroid normal to palpation  RESP: lungs clear to auscultation - no rales, rhonchi or wheezes  BREAST: normal without masses, tenderness or nipple discharge and no palpable axillary masses or adenopathy  CV: regular rate and rhythm, normal S1 S2, no S3 or S4, no murmur, click or rub, no peripheral edema and peripheral pulses strong  ABDOMEN: soft, nontender, no hepatosplenomegaly, no masses and bowel sounds normal   (female): normal female external genitalia, normal urethral meatus, vaginal mucosa pink, moist, well rugated, and normal cervix/adnexa/uterus without masses or discharge  MS: no gross musculoskeletal defects noted, no edema  SKIN: no suspicious lesions or rashes  NEURO: Normal strength and tone, mentation intact and speech normal  PSYCH: mentation appears normal, affect normal/bright  ANNEMARIE-7 SCORE 5/22/2019   Total Score 10       Diagnostic Test Results:  Labs reviewed in Epic    ASSESSMENT/PLAN:   1. Routine general medical examination at a health care facility      2. ASCUS with positive high risk HPV cervical  27 yo female with 5/16/18 ASCUS, +HR HPV, not 16/18. Plan colp.  06/13/18: [place] Bx and ECC neg for dysplasia.   If nil pap and HPV- ok to return pap in 3 yr    - HPV High Risk Types DNA Cervical  - Pap imaged thin layer diagnostic with HPV (select HPV order below)    3. Family planning  Refill given  - LORYNA 3-0.02 MG tablet; Take 1 tablet by mouth daily  Dispense: 90 tablet; Refill: 3    Potential medication side effects were discussed with the patient; let me know if any occur.    4. Anxiety  Assessment: treatment options dicussed. Counseling.  "Medications . Self care, excercise and adequate sleep.  Plan: patient agreeable to start medications - lexapro  Follow up in 4 weeks, on phone or OV, earlier if any concerns with medications     - escitalopram (LEXAPRO) 10 MG tablet; Take 1 tablet (10 mg) by mouth daily  Dispense: 30 tablet; Refill: 11  - start counseling, referral is given  Potential medication side effects were discussed with the patient; let me know if any occur.    COUNSELING:  Reviewed preventive health counseling, as reflected in patient instructions       Regular exercise       Healthy diet/nutrition    Estimated body mass index is 23.52 kg/m  as calculated from the following:    Height as of this encounter: 1.746 m (5' 8.75\").    Weight as of this encounter: 71.7 kg (158 lb 1.6 oz).         reports that she has never smoked. She has never used smokeless tobacco.      Counseling Resources:  ATP IV Guidelines  Pooled Cohorts Equation Calculator  Breast Cancer Risk Calculator  FRAX Risk Assessment  ICSI Preventive Guidelines  Dietary Guidelines for Americans, 2010  USDA's MyPlate  ASA Prophylaxis  Lung CA Screening    Serina Munguia MD  [facility]  "

## 2019-05-23 ASSESSMENT — ANXIETY QUESTIONNAIRES: GAD7 TOTAL SCORE: 10

## 2019-05-24 LAB
COPATH REPORT: NORMAL
PAP: NORMAL

## 2019-07-08 NOTE — PROGRESS NOTES
INDICATIONS:                                                      Beata Calderon, is a 28 year old female, who had a recent negative pap with HR-HPV.  Patient has prior history of abnormal pap. Here today for colposcopy. Discussed indication, risks of infection and bleeding. She had + other type hpv but pap was normal  Had prior colposcopy and ECC negative in 2018  Patient hoping to not have colposcopy every years.     Her last pap was   Lab Results   Component Value Date    PAP NIL 05/22/2019     Is a pregnancy test required: Yes.  Was it positive or negative?  Negative  Was a consent obtained?  Yes    History Overview:  5/16/18 ASCUS, +HR HPV, not 16/18. Plan colp.  06/13/18: [place] Bx and ECC neg for dysplasia. Plan cotest in 1 year.   05/22/19: NIL Pap, + HR HPV (not 16 or 18) result. Plan [place].      PROCEDURE:                                                      Cervix is stained with acetic acid and viewed colposcopically. Squamocolumnar junction is visualized in it's entirety. no visible lesions . Biopsy done No. Endocervical curretage Done         POST PROCEDURE:                                                      IMPRESSION: Patient tolerated procedure well    PLAN : Await the results of the biopsies.  Repeat pap in 12 months.  She  tolerated the procedure well with minimal discomfort. There were no complications. Patient was discharged in stable condition.    We discussed option of changing back to reflex pap in 1 years if path today is normal. She is concerned about cost of annual colposcopy    Patient advised to call the clinic if excessive bleeding, pelvic pain, or fever.     Follow-up plan based on pathology results.    Mary Ann Mercedes MD

## 2019-07-10 ENCOUNTER — OFFICE VISIT (OUTPATIENT)
Dept: OBGYN | Facility: CLINIC | Age: 29
End: 2019-07-10
Payer: COMMERCIAL

## 2019-07-10 VITALS
DIASTOLIC BLOOD PRESSURE: 76 MMHG | BODY MASS INDEX: 23.85 KG/M2 | WEIGHT: 161 LBS | HEART RATE: 72 BPM | HEIGHT: 69 IN | SYSTOLIC BLOOD PRESSURE: 112 MMHG

## 2019-07-10 DIAGNOSIS — N72 HIGH RISK HUMAN PAPILLOMA VIRUS (HPV) INFECTION OF CERVIX: Primary | ICD-10-CM

## 2019-07-10 DIAGNOSIS — Z01.812 PRE-PROCEDURE LAB EXAM: ICD-10-CM

## 2019-07-10 DIAGNOSIS — R87.610 ASCUS WITH POSITIVE HIGH RISK HPV CERVICAL: ICD-10-CM

## 2019-07-10 DIAGNOSIS — R87.810 ASCUS WITH POSITIVE HIGH RISK HPV CERVICAL: ICD-10-CM

## 2019-07-10 DIAGNOSIS — B97.7 HIGH RISK HUMAN PAPILLOMA VIRUS (HPV) INFECTION OF CERVIX: Primary | ICD-10-CM

## 2019-07-10 LAB — HCG UR QL: NEGATIVE

## 2019-07-10 PROCEDURE — 81025 URINE PREGNANCY TEST: CPT | Performed by: OBSTETRICS & GYNECOLOGY

## 2019-07-10 PROCEDURE — 88305 TISSUE EXAM BY PATHOLOGIST: CPT | Performed by: OBSTETRICS & GYNECOLOGY

## 2019-07-10 PROCEDURE — 57456 ENDOCERV CURETTAGE W/SCOPE: CPT | Performed by: OBSTETRICS & GYNECOLOGY

## 2019-07-10 ASSESSMENT — MIFFLIN-ST. JEOR: SCORE: 1525.7

## 2019-07-12 LAB — COPATH REPORT: NORMAL

## 2019-07-16 ENCOUNTER — TELEPHONE (OUTPATIENT)
Dept: OBGYN | Facility: CLINIC | Age: 29
End: 2019-07-16

## 2019-12-11 ENCOUNTER — OFFICE VISIT (OUTPATIENT)
Dept: FAMILY MEDICINE | Facility: CLINIC | Age: 29
End: 2019-12-11
Payer: COMMERCIAL

## 2019-12-11 VITALS
HEIGHT: 69 IN | DIASTOLIC BLOOD PRESSURE: 72 MMHG | BODY MASS INDEX: 24.38 KG/M2 | HEART RATE: 75 BPM | SYSTOLIC BLOOD PRESSURE: 122 MMHG | RESPIRATION RATE: 15 BRPM | TEMPERATURE: 98.6 F | OXYGEN SATURATION: 98 % | WEIGHT: 164.6 LBS

## 2019-12-11 DIAGNOSIS — L73.9 FOLLICULITIS: Primary | ICD-10-CM

## 2019-12-11 DIAGNOSIS — K21.9 GASTROESOPHAGEAL REFLUX DISEASE WITHOUT ESOPHAGITIS: ICD-10-CM

## 2019-12-11 DIAGNOSIS — F41.9 ANXIETY: ICD-10-CM

## 2019-12-11 PROCEDURE — 99214 OFFICE O/P EST MOD 30 MIN: CPT | Performed by: FAMILY MEDICINE

## 2019-12-11 RX ORDER — DIAPER,BRIEF,INFANT-TODD,DISP
EACH MISCELLANEOUS 2 TIMES DAILY
Qty: 30 G | Refills: 1 | Status: SHIPPED | OUTPATIENT
Start: 2019-12-11 | End: 2020-07-15

## 2019-12-11 ASSESSMENT — MIFFLIN-ST. JEOR: SCORE: 1537.03

## 2019-12-11 ASSESSMENT — PATIENT HEALTH QUESTIONNAIRE - PHQ9: 5. POOR APPETITE OR OVEREATING: NOT AT ALL

## 2019-12-11 ASSESSMENT — ANXIETY QUESTIONNAIRES
5. BEING SO RESTLESS THAT IT IS HARD TO SIT STILL: NOT AT ALL
2. NOT BEING ABLE TO STOP OR CONTROL WORRYING: NOT AT ALL
7. FEELING AFRAID AS IF SOMETHING AWFUL MIGHT HAPPEN: NOT AT ALL
GAD7 TOTAL SCORE: 0
1. FEELING NERVOUS, ANXIOUS, OR ON EDGE: NOT AT ALL
3. WORRYING TOO MUCH ABOUT DIFFERENT THINGS: NOT AT ALL
6. BECOMING EASILY ANNOYED OR IRRITABLE: NOT AT ALL
IF YOU CHECKED OFF ANY PROBLEMS ON THIS QUESTIONNAIRE, HOW DIFFICULT HAVE THESE PROBLEMS MADE IT FOR YOU TO DO YOUR WORK, TAKE CARE OF THINGS AT HOME, OR GET ALONG WITH OTHER PEOPLE: NOT DIFFICULT AT ALL

## 2019-12-11 NOTE — PATIENT INSTRUCTIONS
1. Folliculitis  Left breast area  Over the counter hydrocortisone twice daily for 2 weeks or less  Over the counter antibiotic ointment twice daily for 2 weeks or less  Follow up in 2-3 weeks for more or unresolved concerns      2. Anxiety  Stable without medications  Self care- excercise at least 3/wk.  Did not like lexapro- felt groggy & tired.    3. Gastroesophageal reflux disease without esophagitis  Life style changes, to avoid fatty food.  Start proton pump inhibitors for 2-3 months once daily and then everyother day.  - omeprazole (PRILOSEC) 20 MG DR capsule; Take 1 capsule (20 mg) by mouth daily  Dispense: 30 capsule; Refill: 3

## 2019-12-11 NOTE — PROGRESS NOTES
"Subjective     Beata Calderon is a 29 year old female who presents to clinic today for the following health issues:    HPI   BREAST LUMP      Duration: 3-4 weeks    Description (location/character/radiation): pea-sized lump on the outside of left breast    Intensity:  mild    Accompanying signs and symptoms: none    History (similar episodes/previous evaluation): None    Precipitating or alleviating factors: None    Therapies tried and outcome: None     Would also like to discuss Acid Reflux today if possible-  It happens daily- some point through the day, mostly post meals - feels like a fire comes up esophagus, tums and milk helps  Dad has Barrets  esophagitis   BP Readings from Last 3 Encounters:   12/11/19 122/72   07/10/19 112/76   05/22/19 125/83    Wt Readings from Last 3 Encounters:   12/11/19 74.7 kg (164 lb 9.6 oz)   07/10/19 73 kg (161 lb)   05/22/19 71.7 kg (158 lb 1.6 oz)                    PROBLEMS TO ADD ON...  Reviewed and updated as needed this visit by Provider  Meds         Review of Systems   ROS COMP: Constitutional, HEENT, cardiovascular, pulmonary, GI, , musculoskeletal, neuro, skin, endocrine and psych systems are negative, except as otherwise noted.      Objective    /72   Pulse 75   Temp 98.6  F (37  C) (Oral)   Resp 15   Ht 1.746 m (5' 8.75\")   Wt 74.7 kg (164 lb 9.6 oz)   SpO2 98%   BMI 24.48 kg/m    Body mass index is 24.48 kg/m .  Physical Exam   GENERAL: healthy, alert and no distress  NECK: no adenopathy, no asymmetry, masses, or scars and thyroid normal to palpation  RESP: lungs clear to auscultation - no rales, rhonchi or wheezes  CV: regular rate and rhythm  ABDOMEN: soft, nontender, no hepatosplenomegaly, no masses and bowel sounds normal  SKIN: a pea sized papule with eczematous rash on top, located at left breast below the nipple in lateral quad.  Breasts are symmetric.  No dominant, discrete, fixed  or suspicious masses are noted. No other skin or nipple changes or " axillary nodes. Self exam is taught and encouraged.  Psych: Alert and oriented times 3; coherent speech, normal   rate and volume, able to articulate logical thoughts, able   to abstract reason, no tangential thoughts, no hallucinations   or delusions    ANNEMARIE-7 SCORE 5/22/2019 12/11/2019   Total Score 10 0             Assessment & Plan     1. Folliculitis  Left breast area  Over the counter hydrocortisone twice daily for 2 weeks or less  Over the counter antibiotic ointment twice daily for 2 weeks or less  Follow up in 2-3 weeks for more or unresolved concerns      2. Anxiety  Stable without medications  Self care- excercise at least 3/wk.  Did not like lexapro- felt groggy & tired.    3. Gastroesophageal reflux disease without esophagitis  Life style changes, to avoid fatty food.  Start proton pump inhibitors for 2-3 months once daily and then everyother day.  - omeprazole (PRILOSEC) 20 MG DR capsule; Take 1 capsule (20 mg) by mouth daily  Dispense: 30 capsule; Refill: 3   Potential medication side effects were discussed with the patient; let me know if any occur.      Return in about 4 weeks (around 1/8/2020) for concerns,unresolved.    Serina Munguia MD  [facility]

## 2019-12-11 NOTE — NURSING NOTE
"Chief Complaint   Patient presents with     Mass     Small lump on the outside of Left breast     Pulse 75   Ht 1.746 m (5' 8.75\")   Wt 74.7 kg (164 lb 9.6 oz)   SpO2 98%   BMI 24.48 kg/m   Estimated body mass index is 24.48 kg/m  as calculated from the following:    Height as of this encounter: 1.746 m (5' 8.75\").    Weight as of this encounter: 74.7 kg (164 lb 9.6 oz).  Medication Reconciliation: complete        Health Maintenance Due Pending Provider Review:  NONE    n/a    France Marley MA  [facility]  [phone]  "

## 2019-12-12 ASSESSMENT — ANXIETY QUESTIONNAIRES: GAD7 TOTAL SCORE: 0

## 2020-01-08 ENCOUNTER — MYC MEDICAL ADVICE (OUTPATIENT)
Dept: FAMILY MEDICINE | Facility: CLINIC | Age: 30
End: 2020-01-08

## 2020-01-08 DIAGNOSIS — Z30.09 FAMILY PLANNING: ICD-10-CM

## 2020-01-08 RX ORDER — DROSPIRENONE AND ETHINYL ESTRADIOL TABLETS 0.02-3(28)
1 KIT ORAL DAILY
Start: 2020-01-08

## 2020-01-08 NOTE — TELEPHONE ENCOUNTER
"Denied  Too early; Rx sent 5/22/2019 for 1 year  Marry VALERO RN    Last Written Prescription Date:  5/22/2019  Last Fill Quantity: 90,  # refills: 3   Last office visit: 12/11/2019 with prescribing provider:     Future Office Visit:   Next 5 appointments (look out 90 days)    Jan 09, 2020 12:00 PM CST  Telephone Visit with Serina Munguia MD  [facility] ([facility]) [address] [address] [phone]         Requested Prescriptions   Pending Prescriptions Disp Refills     LORYNA 3-0.02 MG tablet [Pharmacy Med Name: LORYNA 3MG/0.02MG TABLETS 28S] 28 tablet      Sig: TAKE 1 TABLET BY MOUTH DAILY       Contraceptives Protocol Passed - 1/8/2020  9:10 AM        Passed - Patient is not a current smoker if age is 35 or older        Passed - Recent (12 mo) or future (30 days) visit within the authorizing provider's specialty     Patient has had an office visit with the authorizing provider or a provider within the authorizing providers department within the previous 12 mos or has a future within next 30 days. See \"Patient Info\" tab in inbasket, or \"Choose Columns\" in Meds & Orders section of the refill encounter.              Passed - Medication is active on med list        Passed - No active pregnancy on record        Passed - No positive pregnancy test in past 12 months        "

## 2020-01-09 ENCOUNTER — VIRTUAL VISIT (OUTPATIENT)
Dept: FAMILY MEDICINE | Facility: CLINIC | Age: 30
End: 2020-01-09
Payer: COMMERCIAL

## 2020-01-09 DIAGNOSIS — F41.9 ANXIETY: Primary | ICD-10-CM

## 2020-01-09 PROCEDURE — 99441 ZZC PHYSICIAN TELEPHONE EVALUATION 5-10 MIN: CPT | Performed by: FAMILY MEDICINE

## 2020-01-09 RX ORDER — ESCITALOPRAM OXALATE 10 MG/1
10 TABLET ORAL DAILY
Qty: 30 TABLET | Refills: 1 | Status: SHIPPED | OUTPATIENT
Start: 2020-01-09 | End: 2020-06-09

## 2020-01-09 RX ORDER — CITALOPRAM HYDROBROMIDE 10 MG/1
10 TABLET ORAL DAILY
Qty: 30 TABLET | Refills: 1 | Status: CANCELLED | OUTPATIENT
Start: 2020-01-09

## 2020-01-09 ASSESSMENT — ANXIETY QUESTIONNAIRES
1. FEELING NERVOUS, ANXIOUS, OR ON EDGE: MORE THAN HALF THE DAYS
2. NOT BEING ABLE TO STOP OR CONTROL WORRYING: MORE THAN HALF THE DAYS
IF YOU CHECKED OFF ANY PROBLEMS ON THIS QUESTIONNAIRE, HOW DIFFICULT HAVE THESE PROBLEMS MADE IT FOR YOU TO DO YOUR WORK, TAKE CARE OF THINGS AT HOME, OR GET ALONG WITH OTHER PEOPLE: NOT DIFFICULT AT ALL
GAD7 TOTAL SCORE: 9
6. BECOMING EASILY ANNOYED OR IRRITABLE: SEVERAL DAYS
5. BEING SO RESTLESS THAT IT IS HARD TO SIT STILL: NOT AT ALL
3. WORRYING TOO MUCH ABOUT DIFFERENT THINGS: MORE THAN HALF THE DAYS
7. FEELING AFRAID AS IF SOMETHING AWFUL MIGHT HAPPEN: MORE THAN HALF THE DAYS

## 2020-01-09 ASSESSMENT — PATIENT HEALTH QUESTIONNAIRE - PHQ9
SUM OF ALL RESPONSES TO PHQ QUESTIONS 1-9: 3
5. POOR APPETITE OR OVEREATING: NOT AT ALL

## 2020-01-09 NOTE — PATIENT INSTRUCTIONS
Please stay active with any physical activity of choice  Consider to  start counseling &  referral is sent  Lexapro 10 mg once daily - refills given for 1 month as well  There is an element of seasonal affective disorder as well.    Follow up is advised in 4 weeks, TE or OV     Please keep us posted with questions or concerns .      Best Regards,    Serina Munguia MD  [facility]  [phone]

## 2020-01-09 NOTE — PROGRESS NOTES
"Beata Calderon is a 29 year old female who is being evaluated via a telephone visit.      The patient has been notified of following (by M.A)      S: The history as provided by the patient to the provider during this 3 way call include     Pertinent parts of the the patient's medical history reviewed and confirmed by the provider included :        Woke up yesterday feeling very depressed and anxious  Yesterday went to gym , very helpful.  History of depression in past- worse in past few weeks  Always has more anxiety than depression   Weather had adverse effects    Took lexapro in may 2018- 4 weeks  Assessment/Plan:  (F41.9) Anxiety  (primary encounter diagnosis)    ANNEMARIE-7 SCORE 5/22/2019 12/11/2019 1/9/2020   Total Score 10 0 9     Plan:tx options discussed  Willing to start counseling referral is sent  Willing to resume lexparo- it helped in past  There is an element of seasonal affective disorder as well.  She is motivated to excercise more and that seem to have elevated her mood better in past as well  Follow up is advised in 4 weeks, TE or OV     Total time of call between patient and provider was  6 minutes      (MD signature)  ===================================================    I have reviewed the note as documented above.  This accurately captures the substance of my conversation with the patient,    Additional provider notes:        \"We have found that certain health care needs can be provided without the need for a physical exam.  This service lets us provide the care you need with a short phone conversation.  If a prescription is necessary we can send it directly to your pharmacy.  If lab work is needed we can place an order for that and you can then stop by our lab to have the test done at a later time.    This telephone visit will be conducted via 3 way call with the you (the patient) , the physician/provider, and a me all on the line at the same time.  This allows your physician/provider to have the " "phone conversation with you while I will be taking notes for your medical record.  We will have full access to your [facility] medical record during this entire phone call.    Since this is like an office visit,  will bill your insurance company for this service.  Please check with your medical insurance if this type of telephone/virtual is covered . You may be responsible for the cost of this service if insurance coverage is denied.  The typical cost is $30 (10min), $59(11-20min) and $85 (21-30min)     If during the course of the call the physician/provider feels a telephone visit is not appropriate, you will not be charged for this service\"    Consent has been obtained for this service by care team member: yes.  See the scanned image in the medical record.                  "

## 2020-01-10 DIAGNOSIS — N39.0 RECURRENT UTI: ICD-10-CM

## 2020-01-10 RX ORDER — SULFAMETHOXAZOLE AND TRIMETHOPRIM 400; 80 MG/1; MG/1
1 TABLET ORAL
Qty: 30 TABLET | OUTPATIENT
Start: 2020-01-10

## 2020-01-10 ASSESSMENT — ANXIETY QUESTIONNAIRES: GAD7 TOTAL SCORE: 9

## 2020-01-10 NOTE — TELEPHONE ENCOUNTER
Left message to call back to schedule first available with Dr. Star Mcdwoell January 10, 2020 9:39 AM

## 2020-01-10 NOTE — TELEPHONE ENCOUNTER
----- Message from Allie Hensley RN sent at 1/10/2020  7:39 AM CST -----  Pt  GABINO BEST [[id number]]    Please call to schedule.  Urology   Malik Graves MD        Thanks    I do not need any follow up on the scheduling of this appointment unless the patient will no longer be receiving care in our clinic.

## 2020-01-10 NOTE — TELEPHONE ENCOUNTER
"  sulfamethoxazole-trimethoprim (BACTRIM/SEPTRA) 400-80 MG per tablet   Last Written Prescription Date:  8/3/18  Last Fill Quantity: 30   # refills: 3  Last Office Visit : 7/12/17  Future Office visit:  None    \" -f/u in 3 months to reassess\"    Scheduling has been notified to contact the pt for appointment.    Routing refill request to provider for review/approval because: not on protocol/ lv > 2 yrs    "

## 2020-04-29 ENCOUNTER — MYC MEDICAL ADVICE (OUTPATIENT)
Dept: FAMILY MEDICINE | Facility: CLINIC | Age: 30
End: 2020-04-29

## 2020-04-29 DIAGNOSIS — Z30.09 FAMILY PLANNING: ICD-10-CM

## 2020-04-29 RX ORDER — DROSPIRENONE AND ETHINYL ESTRADIOL TABLETS 0.02-3(28)
1 KIT ORAL DAILY
Qty: 90 TABLET | Refills: 0 | Status: SHIPPED | OUTPATIENT
Start: 2020-04-29 | End: 2020-07-15

## 2020-05-02 DIAGNOSIS — Z30.09 FAMILY PLANNING: ICD-10-CM

## 2020-05-04 RX ORDER — DROSPIRENONE AND ETHINYL ESTRADIOL TABLETS 0.02-3(28)
1 KIT ORAL DAILY
Start: 2020-05-04

## 2020-06-07 DIAGNOSIS — F41.9 ANXIETY: ICD-10-CM

## 2020-06-08 NOTE — TELEPHONE ENCOUNTER
Left message for pt to call- break in medication has she been taking regularly? Current Rx would have been complete 3/9/20    Last ordered:     30 tablet  1  1/9/2020        Also has not been in for a 4 week f/u yet-    1/9/20 OV notes:  Follow up is advised in 4 weeks, TE or OV      Jocelyne Laguerre RN

## 2020-06-09 RX ORDER — ESCITALOPRAM OXALATE 10 MG/1
10 TABLET ORAL DAILY
Qty: 30 TABLET | Refills: 1 | Status: SHIPPED | OUTPATIENT
Start: 2020-06-09 | End: 2020-07-15

## 2020-06-09 RX ORDER — ESCITALOPRAM OXALATE 10 MG/1
TABLET ORAL
Qty: 30 TABLET | Refills: 2 | OUTPATIENT
Start: 2020-06-09

## 2020-06-09 NOTE — TELEPHONE ENCOUNTER
Pt has had extra escitalopram from an old Rx so has been taking Rx consistently over the last few months. Aware about follow up, will call back to schedule a physical in the next couple of weeks. Sent in 60 day supply    Reyna Choi RN

## 2020-07-15 ENCOUNTER — OFFICE VISIT (OUTPATIENT)
Dept: FAMILY MEDICINE | Facility: CLINIC | Age: 30
End: 2020-07-15
Payer: COMMERCIAL

## 2020-07-15 VITALS
SYSTOLIC BLOOD PRESSURE: 135 MMHG | RESPIRATION RATE: 16 BRPM | HEIGHT: 69 IN | HEART RATE: 78 BPM | BODY MASS INDEX: 24.44 KG/M2 | OXYGEN SATURATION: 98 % | WEIGHT: 165 LBS | TEMPERATURE: 98.4 F | DIASTOLIC BLOOD PRESSURE: 84 MMHG

## 2020-07-15 DIAGNOSIS — R87.810 ASCUS WITH POSITIVE HIGH RISK HPV CERVICAL: ICD-10-CM

## 2020-07-15 DIAGNOSIS — K21.9 GASTROESOPHAGEAL REFLUX DISEASE WITHOUT ESOPHAGITIS: ICD-10-CM

## 2020-07-15 DIAGNOSIS — R87.610 ASCUS WITH POSITIVE HIGH RISK HPV CERVICAL: ICD-10-CM

## 2020-07-15 DIAGNOSIS — Z00.00 ROUTINE GENERAL MEDICAL EXAMINATION AT A HEALTH CARE FACILITY: Primary | ICD-10-CM

## 2020-07-15 DIAGNOSIS — K82.9 GALLBLADDER ATTACK: ICD-10-CM

## 2020-07-15 DIAGNOSIS — Z30.09 FAMILY PLANNING: ICD-10-CM

## 2020-07-15 DIAGNOSIS — F41.9 ANXIETY: ICD-10-CM

## 2020-07-15 PROCEDURE — 99395 PREV VISIT EST AGE 18-39: CPT | Performed by: FAMILY MEDICINE

## 2020-07-15 PROCEDURE — 88175 CYTOPATH C/V AUTO FLUID REDO: CPT | Performed by: FAMILY MEDICINE

## 2020-07-15 PROCEDURE — 87624 HPV HI-RISK TYP POOLED RSLT: CPT | Performed by: FAMILY MEDICINE

## 2020-07-15 PROCEDURE — 99214 OFFICE O/P EST MOD 30 MIN: CPT | Mod: 25 | Performed by: FAMILY MEDICINE

## 2020-07-15 RX ORDER — DROSPIRENONE AND ETHINYL ESTRADIOL TABLETS 0.02-3(28)
1 KIT ORAL DAILY
Qty: 90 TABLET | Refills: 3 | Status: SHIPPED | OUTPATIENT
Start: 2020-07-15 | End: 2021-06-09

## 2020-07-15 RX ORDER — DIAPER,BRIEF,INFANT-TODD,DISP
EACH MISCELLANEOUS 2 TIMES DAILY
Qty: 30 G | Refills: 1 | Status: SHIPPED | OUTPATIENT
Start: 2020-07-15 | End: 2021-02-03

## 2020-07-15 RX ORDER — ESCITALOPRAM OXALATE 10 MG/1
10 TABLET ORAL DAILY
Qty: 90 TABLET | Refills: 3 | Status: SHIPPED | OUTPATIENT
Start: 2020-07-15 | End: 2021-02-03

## 2020-07-15 ASSESSMENT — ANXIETY QUESTIONNAIRES
5. BEING SO RESTLESS THAT IT IS HARD TO SIT STILL: NOT AT ALL
7. FEELING AFRAID AS IF SOMETHING AWFUL MIGHT HAPPEN: NOT AT ALL
3. WORRYING TOO MUCH ABOUT DIFFERENT THINGS: NOT AT ALL
2. NOT BEING ABLE TO STOP OR CONTROL WORRYING: NOT AT ALL
GAD7 TOTAL SCORE: 0
1. FEELING NERVOUS, ANXIOUS, OR ON EDGE: NOT AT ALL
6. BECOMING EASILY ANNOYED OR IRRITABLE: NOT AT ALL
IF YOU CHECKED OFF ANY PROBLEMS ON THIS QUESTIONNAIRE, HOW DIFFICULT HAVE THESE PROBLEMS MADE IT FOR YOU TO DO YOUR WORK, TAKE CARE OF THINGS AT HOME, OR GET ALONG WITH OTHER PEOPLE: NOT DIFFICULT AT ALL

## 2020-07-15 ASSESSMENT — MIFFLIN-ST. JEOR: SCORE: 1538.85

## 2020-07-15 ASSESSMENT — PATIENT HEALTH QUESTIONNAIRE - PHQ9
SUM OF ALL RESPONSES TO PHQ QUESTIONS 1-9: 0
5. POOR APPETITE OR OVEREATING: NOT AT ALL

## 2020-07-15 NOTE — NURSING NOTE
"Chief Complaint   Patient presents with     Physical     initial /84 (BP Location: Right arm, Cuff Size: Adult Regular)   Pulse 78   Temp 98.4  F (36.9  C) (Oral)   Resp 16   Ht 1.746 m (5' 8.75\")   Wt 74.8 kg (165 lb)   LMP 07/08/2020   SpO2 98%   BMI 24.54 kg/m   Estimated body mass index is 24.54 kg/m  as calculated from the following:    Height as of this encounter: 1.746 m (5' 8.75\").    Weight as of this encounter: 74.8 kg (165 lb).  BP completed using cuff size: regular.  R arm      Health Maintenance that is potentially due pending provider review:  NONE    n/a    Nagi Randhawa ma  "

## 2020-07-15 NOTE — PROGRESS NOTES
"   SUBJECTIVE:   CC: Beata Calderon is an 29 year old woman who presents for preventive health visit.     HPI  {Add if <65 person on Medicare  - Required Questions (Optional):936353}  {Outside tests to abstract? :493047}    {additional problems to add (Optional):852150}    Today's PHQ-2 Score:   PHQ-2 ( 1999 [employer]) 7/10/2019   Q1: Little interest or pleasure in doing things 0   Q2: Feeling down, depressed or hopeless 0   PHQ-2 Score 0   Q1: Little interest or pleasure in doing things -   Q2: Feeling down, depressed or hopeless -   PHQ-2 Score -       Abuse: Current or Past(Physical, Sexual or Emotional)- { :427163}  Do you feel safe in your environment? { :835169}        Social History     Tobacco Use     Smoking status: Never Smoker     Smokeless tobacco: Never Used   Substance Use Topics     Alcohol use: Yes     Alcohol/week: 5.0 standard drinks     Types: 5 Cans of beer per week     {Rooming Staff- Complete this question if Prescreen response is not shown below for today's visit. If you drink alcohol do you typically have >3 drinks per day or >7 drinks per week? (Optional):405570}    Alcohol Use 5/21/2019   Prescreen: >3 drinks/day or >7 drinks/week? No   Prescreen: >3 drinks/day or >7 drinks/week? -   {add AUDIT responses (Optional) (A score of 7 for adult men is an indication of hazardous drinking; a score of 8 or more is an indication of an alcohol use disorder.  A score of 7 or more for adult women is an indication of hazardous drinking or an alchohol use disorder):271230}    Reviewed orders with patient.  Reviewed health maintenance and updated orders accordingly - { :789306::\"Yes\"}  {Chronicprobdata (optional):298037}    {Mammo Decision Support (Optional):823172}    Pertinent mammograms are reviewed under the imaging tab.  History of abnormal Pap smear: { :635265}  PAP / HPV Latest Ref Rng & Units 5/22/2019 5/16/2018   PAP - NIL ASC-US(A)   HPV 16 DNA NEG:Negative Negative Negative   HPV 18 DNA NEG:Negative " "Negative Negative   OTHER HR HPV NEG:Negative Positive(A) Positive(A)     Reviewed and updated as needed this visit by clinical staff         Reviewed and updated as needed this visit by Provider        {HISTORY OPTIONS (Optional):278582}    Review of Systems  {FEMALE ROS (Optional):968165}     OBJECTIVE:   There were no vitals taken for this visit.  Physical Exam  {Exam Choices (Optional):239772}    {Diagnostic Test Results (Optional):051576::\"Diagnostic Test Results:\",\"Labs reviewed in Epic\"}    ASSESSMENT/PLAN:   {Diag Picklist:740439}    COUNSELING:  {FEMALE COUNSELING MESSAGES:546877::\"Reviewed preventive health counseling, as reflected in patient instructions\"}    Estimated body mass index is 24.48 kg/m  as calculated from the following:    Height as of 12/11/19: 1.746 m (5' 8.75\").    Weight as of 12/11/19: 74.7 kg (164 lb 9.6 oz).    {Weight Management Plan (ACO) Complete if BMI is abnormal-  Ages 18-64  BMI >24.9.  Age 65+ with BMI <23 or >30 (Optional):294352}     reports that she has never smoked. She has never used smokeless tobacco.  {Tobacco Cessation -- Complete if patient is a smoker (Optional):682739}    Counseling Resources:  ATP IV Guidelines  Pooled Cohorts Equation Calculator  Breast Cancer Risk Calculator  FRAX Risk Assessment  ICSI Preventive Guidelines  Dietary Guidelines for Americans, 2010  USDA's MyPlate  ASA Prophylaxis  Lung CA Screening    Serina Munguia MD  [facility]  "

## 2020-07-15 NOTE — PATIENT INSTRUCTIONS
Call to schedule your imaging:gallbladder attacks, if uls is neg-  Then proceed with HIDA scan  Meanwhile eat healthy, low in carbs, fats     [facility] [phone]    Preventive Health Recommendations  Female Ages 26 - 39  Yearly exam:   See your health care provider every year in order to    Review health changes.     Discuss preventive care.      Review your medicines if you your doctor has prescribed any.    Until age 30: Get a Pap test every three years (more often if you have had an abnormal result).    After age 30: Talk to your doctor about whether you should have a Pap test every 3 years or have a Pap test with HPV screening every 5 years.   You do not need a Pap test if your uterus was removed (hysterectomy) and you have not had cancer.  You should be tested each year for STDs (sexually transmitted diseases), if you're at risk.   Talk to your provider about how often to have your cholesterol checked.  If you are at risk for diabetes, you should have a diabetes test (fasting glucose).  Shots: Get a flu shot each year. Get a tetanus shot every 10 years.   Nutrition:     Eat at least 5 servings of fruits and vegetables each day.    Eat whole-grain bread, whole-wheat pasta and brown rice instead of white grains and rice.    Get adequate Calcium and Vitamin D.     Lifestyle    Exercise at least 150 minutes a week (30 minutes a day, 5 days of the week). This will help you control your weight and prevent disease.    Limit alcohol to one drink per day.    No smoking.     Wear sunscreen to prevent skin cancer.    See your dentist every six months for an exam and cleaning.    Preventive Health Recommendations  Female Ages 26 - 39  Yearly exam:   See your health care provider every year in order to    Review health changes.     Discuss preventive care.      Review your medicines if you your doctor has prescribed any.    Until age 30: Get a Pap test every three years (more often if you have had an abnormal  result).    After age 30: Talk to your doctor about whether you should have a Pap test every 3 years or have a Pap test with HPV screening every 5 years.   You do not need a Pap test if your uterus was removed (hysterectomy) and you have not had cancer.  You should be tested each year for STDs (sexually transmitted diseases), if you're at risk.   Talk to your provider about how often to have your cholesterol checked.  If you are at risk for diabetes, you should have a diabetes test (fasting glucose).  Shots: Get a flu shot each year. Get a tetanus shot every 10 years.   Nutrition:     Eat at least 5 servings of fruits and vegetables each day.    Eat whole-grain bread, whole-wheat pasta and brown rice instead of white grains and rice.    Get adequate Calcium and Vitamin D.     Lifestyle    Exercise at least 150 minutes a week (30 minutes a day, 5 days of the week). This will help you control your weight and prevent disease.    Limit alcohol to one drink per day.    No smoking.     Wear sunscreen to prevent skin cancer.    See your dentist every six months for an exam and cleaning.

## 2020-07-17 LAB
COPATH REPORT: NORMAL
PAP: NORMAL

## 2020-07-22 PROBLEM — K82.9 GALLBLADDER ATTACK: Status: ACTIVE | Noted: 2020-07-22

## 2020-07-22 PROBLEM — K21.9 GASTROESOPHAGEAL REFLUX DISEASE WITHOUT ESOPHAGITIS: Status: ACTIVE | Noted: 2020-07-22

## 2020-07-23 ENCOUNTER — PATIENT OUTREACH (OUTPATIENT)
Dept: FAMILY MEDICINE | Facility: CLINIC | Age: 30
End: 2020-07-23

## 2020-07-23 DIAGNOSIS — R87.610 ASCUS WITH POSITIVE HIGH RISK HPV CERVICAL: ICD-10-CM

## 2020-07-23 DIAGNOSIS — R87.810 ASCUS WITH POSITIVE HIGH RISK HPV CERVICAL: ICD-10-CM

## 2020-07-23 ASSESSMENT — ANXIETY QUESTIONNAIRES: GAD7 TOTAL SCORE: 0

## 2020-07-23 NOTE — TELEPHONE ENCOUNTER
Hi Dr. Mercedes,  I called the pt and informed her of the results and recommendations. She is requesting that I send this to you to see if she needs another [place] since her last 2 [place]'s came back normal. Would you advise another colp now or like other follow up? Please advise.   Thank you,  Lora Jamison, RN-Pap Tracking     Pap Hx:  5/16/18 ASCUS, +HR HPV, not 16/18. Plan colp.  06/13/18: [place] Bx and ECC neg for dysplasia. Plan cotest in 1 year.   05/22/19: NIL Pap, + HR HPV (not 16 or 18) result. Plan [place].  07/10/19: [place] ECC Benign. Plan Pap in 1 year.   7/15/20 NIL pap, + HR HPV (not 16 or 18). Plan [place] due bef 10/15/20.

## 2020-07-23 NOTE — TELEPHONE ENCOUNTER
5/16/18 ASCUS, +HR HPV, not 16/18. Plan colp.  06/13/18: [place] Bx and ECC neg for dysplasia. Plan cotest in 1 year.   05/22/19: NIL Pap, + HR HPV (not 16 or 18) result. Plan [place].  07/10/19: [place] ECC Benign. Plan Pap in 1 year.   7/15/20 NIL pap, + HR HPV (not 16 or 18). Plan [place] due bef 10/15/20.

## 2020-07-23 NOTE — TELEPHONE ENCOUNTER
RN to call pt with recommendation for repeat pap in 1 year. HM, PL and tracking updated.     TORIN PérezN, RN   Pap Tracking Nurse

## 2020-07-27 NOTE — TELEPHONE ENCOUNTER
Patient notified of recommendation to repeat pap in 1 yr instead of colp now.  Lynette Nissen RN

## 2020-11-20 ENCOUNTER — MYC MEDICAL ADVICE (OUTPATIENT)
Dept: FAMILY MEDICINE | Facility: CLINIC | Age: 30
End: 2020-11-20

## 2021-02-03 ENCOUNTER — VIRTUAL VISIT (OUTPATIENT)
Dept: FAMILY MEDICINE | Facility: CLINIC | Age: 31
End: 2021-02-03
Payer: COMMERCIAL

## 2021-02-03 DIAGNOSIS — K21.9 GASTROESOPHAGEAL REFLUX DISEASE WITHOUT ESOPHAGITIS: ICD-10-CM

## 2021-02-03 DIAGNOSIS — F41.9 ANXIETY: Primary | ICD-10-CM

## 2021-02-03 PROCEDURE — 99213 OFFICE O/P EST LOW 20 MIN: CPT | Mod: 95 | Performed by: FAMILY MEDICINE

## 2021-02-03 RX ORDER — ESCITALOPRAM OXALATE 5 MG/1
5 TABLET ORAL DAILY
Qty: 30 TABLET | Refills: 0 | Status: SHIPPED | OUTPATIENT
Start: 2021-02-03 | End: 2021-06-09

## 2021-02-03 ASSESSMENT — PATIENT HEALTH QUESTIONNAIRE - PHQ9
SUM OF ALL RESPONSES TO PHQ QUESTIONS 1-9: 1
10. IF YOU CHECKED OFF ANY PROBLEMS, HOW DIFFICULT HAVE THESE PROBLEMS MADE IT FOR YOU TO DO YOUR WORK, TAKE CARE OF THINGS AT HOME, OR GET ALONG WITH OTHER PEOPLE: NOT DIFFICULT AT ALL
SUM OF ALL RESPONSES TO PHQ QUESTIONS 1-9: 1

## 2021-02-03 ASSESSMENT — ANXIETY QUESTIONNAIRES
7. FEELING AFRAID AS IF SOMETHING AWFUL MIGHT HAPPEN: NOT AT ALL
6. BECOMING EASILY ANNOYED OR IRRITABLE: NOT AT ALL
GAD7 TOTAL SCORE: 0
5. BEING SO RESTLESS THAT IT IS HARD TO SIT STILL: NOT AT ALL
7. FEELING AFRAID AS IF SOMETHING AWFUL MIGHT HAPPEN: NOT AT ALL
1. FEELING NERVOUS, ANXIOUS, OR ON EDGE: NOT AT ALL
GAD7 TOTAL SCORE: 0
4. TROUBLE RELAXING: NOT AT ALL
3. WORRYING TOO MUCH ABOUT DIFFERENT THINGS: NOT AT ALL
2. NOT BEING ABLE TO STOP OR CONTROL WORRYING: NOT AT ALL
GAD7 TOTAL SCORE: 0

## 2021-02-03 NOTE — PROGRESS NOTES
Beata is a 30 year old who is being evaluated via a billable video visit.      How would you like to obtain your AVS? MyChart  If the video visit is dropped, the invitation should be resent by: Text to cell phone: [phone]  Will anyone else be joining your video visit? No  Assessment and plan    1. Anxiety  Beata is 30 year old female - has been on lexapro for 1 yr.  It helped a lot & she feels ready to  taper off the medications.  & tapering off schedule given to her.  Please see patient instructions   It can be tapered off in a month time- as long as no recurrence of symptoms of anxiety or depression. Given past history the recurrences not uncommon 7 she understands.  Withdrawal symptoms also discussed     - escitalopram (LEXAPRO) 5 MG tablet; Take 1 tablet (5 mg) by mouth daily  Dispense: 30 tablet; Refill: 0  Follow up as needed  In 4 weeks for questions and concerns    2. Gastroesophageal reflux disease without esophagitis  Omeprazole once daily- helps.      Telephone only appointment . [employer] was not working on my phone and [employer] did not work on patient's end.    Subjective     Beata is a 30 year old who presents to clinic today for the following health issues     HPI   Has been on lexapro jan 2020, & currently on 10 mg once daily   Feeling great and wondering about tapering it off because would like to be off- basia when situational stress has improved with work.  Last year worked at [employer] & that was most rough patch.  Now feels much better place in life- still working at COVID unit-but much better conditions and feeling hopeful  Is able to excercise in gym- and that improves well being.  looking forward to tapering the medications off.  PHQ 1/9/2020 7/15/2020 2/3/2021   PHQ-9 Total Score 3 0 1   Q9: Thoughts of better off dead/self-harm past 2 weeks Several days Not at all Not at all   F/U: Thoughts of suicide or self-harm Yes - -   F/U: Self harm-plan No - -   F/U: Self-harm action No - -    F/U: Safety concerns No - -     ANNEMARIE-7 SCORE 1/9/2020 7/15/2020 2/3/2021   Total Score - - 0 (minimal anxiety)   Total Score 9 0 0         Social History     Tobacco Use     Smoking status: Never Smoker     Smokeless tobacco: Never Used   Substance Use Topics     Alcohol use: Yes     Alcohol/week: 5.0 standard drinks     Types: 5 Cans of beer per week     Drug use: No     PHQ 1/9/2020 7/15/2020 2/3/2021   PHQ-9 Total Score 3 0 1   Q9: Thoughts of better off dead/self-harm past 2 weeks Several days Not at all Not at all   F/U: Thoughts of suicide or self-harm Yes - -   F/U: Self harm-plan No - -   F/U: Self-harm action No - -   F/U: Safety concerns No - -     ANNEMARIE-7 SCORE 1/9/2020 7/15/2020 2/3/2021   Total Score - - 0 (minimal anxiety)   Total Score 9 0 0   Review of Systems has been taking omeprazole as that helps with gastroesophageal reflux disease    Constitutional, HEENT, cardiovascular, pulmonary, GI, , musculoskeletal, neuro, skin, endocrine and psych systems are negative, except as otherwise noted.      Objective       Speech clear.no objective exam because of the telephone nly appointment     Vitals:  No vitals were obtained today due to virtual visit.    Physical Exam   PSYCH: Mentation appears normal, affect normal/bright, judgement and insight intact, normal speech and appearance well-groomed.    Answers for HPI/ROS submitted by the patient on 2/3/2021   If you checked off any problems, how difficult have these problems made it for you to do your work, take care of things at home, or get along with other people?: Not difficult at all  PHQ9 TOTAL SCORE: 1  ANNEMARIE 7 TOTAL SCORE: 0

## 2021-02-04 ASSESSMENT — ANXIETY QUESTIONNAIRES: GAD7 TOTAL SCORE: 0

## 2021-06-08 NOTE — PROGRESS NOTES
SUBJECTIVE:   CC: Beata Calderon is an 30 year old woman who presents for preventive health visit.       Patient has been advised of split billing requirements and indicates understanding: Yes  Healthy Habits:     Getting at least 3 servings of Calcium per day:  Yes    Bi-annual eye exam:  Yes    Dental care twice a year:  Yes    Sleep apnea or symptoms of sleep apnea:  None    Diet:  Regular (no restrictions)    Frequency of exercise:  4-5 days/week    Duration of exercise:  30-45 minutes    Taking medications regularly:  Yes    Medication side effects:  None    PHQ-2 Total Score: 0    Additional concerns today:  No    Work related anxiety daily mostly , sometimes outside of work  Very active, training for triathlon in august, eats healthy-   Getting  in 09/11/2021      Concerns about high Blood pressure , because of positive family history of hypertension in  dad & maternal aunties have hypertension-& she has checked hers at work on and off     Does admit to anxiety- besides situational anxiety  Off of lexapro for > 3 months  Sister is depressed and takes Wellbutrin and she is wondering if she should be on it.She deneis depression and most anxious at work and its constant.  Very Busy work- feeling burnt out for sure.  Gained covid weight          Today's PHQ-2 Score:   PHQ-2 ( 1999 Pfizer) 6/8/2021   Q1: Little interest or pleasure in doing things 0   Q2: Feeling down, depressed or hopeless 0   PHQ-2 Score 0   Q1: Little interest or pleasure in doing things Not at all   Q2: Feeling down, depressed or hopeless Not at all   PHQ-2 Score 0       Abuse: Current or Past (Physical, Sexual or Emotional) - No  Do you feel safe in your environment? Yes    Have you ever done Advance Care Planning? (For example, a Health Directive, POLST, or a discussion with a medical provider or your loved ones about your wishes): No, advance care planning information given to patient to review.  Advanced care planning was discussed  at today's visit.    Social History     Tobacco Use     Smoking status: Never Smoker     Smokeless tobacco: Never Used   Substance Use Topics     Alcohol use: Yes     Alcohol/week: 5.0 standard drinks     If you drink alcohol do you typically have >3 drinks per day or >7 drinks per week? No    No flowsheet data found.    Reviewed orders with patient.  Reviewed health maintenance and updated orders accordingly - Yes  BP Readings from Last 3 Encounters:   06/09/21 (!) 137/92   07/15/20 135/84   12/11/19 122/72    Wt Readings from Last 3 Encounters:   06/09/21 77.7 kg (171 lb 4.8 oz)   07/15/20 74.8 kg (165 lb)   12/11/19 74.7 kg (164 lb 9.6 oz)                    Breast Cancer Screening:    Breast CA Risk Assessment (FHS-7) 6/2/2021   Do you have a family history of breast, colon, or ovarian cancer? No / Unknown           Pertinent mammograms are reviewed under the imaging tab.    History of abnormal Pap smear: NO - age 21-29 PAP every 3 years recommended  PAP / HPV Latest Ref Rng & Units 6/9/2021 7/15/2020 5/22/2019   PAP - ASC-US(A) NIL NIL   HPV 16 DNA NEG:Negative - Negative Negative   HPV 18 DNA NEG:Negative - Negative Negative   OTHER HR HPV NEG:Negative - Positive(A) Positive(A)     Reviewed and updated as needed this visit by clinical staff  Tobacco  Allergies  Meds  Problems  Med Hx  Surg Hx  Fam Hx  Soc Hx          Reviewed and updated as needed this visit by Provider  Tobacco  Allergies  Meds  Problems  Med Hx  Surg Hx  Fam Hx             Review of Systems  CONSTITUTIONAL: NEGATIVE for fever, chills, change in weight  INTEGUMENTARU/SKIN: NEGATIVE for worrisome rashes, moles or lesions  EYES: NEGATIVE for vision changes or irritation  ENT: NEGATIVE for ear, mouth and throat problems  RESP: NEGATIVE for significant cough or SOB  BREAST: NEGATIVE for masses, tenderness or discharge  CV: NEGATIVE for chest pain, palpitations or peripheral edema  GI: NEGATIVE for nausea, abdominal pain, heartburn,  "or change in bowel habits  : NEGATIVE for unusual urinary or vaginal symptoms. Periods are regular.  MUSCULOSKELETAL: NEGATIVE for significant arthralgias or myalgia  NEURO: NEGATIVE for weakness, dizziness or paresthesias  ENDOCRINE: NEGATIVE for temperature intolerance, skin/hair changes  PSYCHIATRIC: NEGATIVE for changes in mood or affect     OBJECTIVE:   BP (!) 137/92   Pulse 111   Temp 98.5  F (36.9  C) (Tympanic)   Resp 16   Ht 1.753 m (5' 9\")   Wt 77.7 kg (171 lb 4.8 oz)   LMP 06/02/2021 (Exact Date)   SpO2 100%   Breastfeeding No   BMI 25.30 kg/m    Physical Exam  GENERAL: healthy, alert and no distress  EYES: Eyes grossly normal to inspection, PERRL and conjunctivae and sclerae normal  HENT: ear canals and TM's normal, nose and mouth without ulcers or lesions  NECK: no adenopathy, no asymmetry, masses, or scars and thyroid normal to palpation  RESP: lungs clear to auscultation - no rales, rhonchi or wheezes  BREAST: normal without masses, tenderness or nipple discharge and no palpable axillary masses or adenopathy  CV: regular rate and rhythm, normal S1 S2, no S3 or S4, no murmur, click or rub, no peripheral edema and peripheral pulses strong  ABDOMEN: soft, nontender, no hepatosplenomegaly, no masses and bowel sounds normal   (female): normal female external genitalia, normal urethral meatus, vaginal mucosa pink, moist, well rugated, and normal cervix/adnexa/uterus without masses or discharge  MS: no gross musculoskeletal defects noted, no edema  SKIN: no suspicious lesions or rashes  NEURO: Normal strength and tone, mentation intact and speech normal  PSYCH: mentation appears normal, affect normal/bright  ANNEMARIE-7 SCORE 7/15/2020 2/3/2021 6/9/2021   Total Score - 0 (minimal anxiety) 4 (minimal anxiety)   Total Score 0 0 4       Diagnostic Test Results:  Labs reviewed in Epic    ASSESSMENT/PLAN:   Beata was seen today for physical.    Diagnoses and all orders for this visit:    Routine general " medical examination at a health care facility    Anxiety'ANNEMARIE score 4  Comments:   Lexapro helped in past. she was able to stop for 3 months and anxiety recurred, treatment options discussed and i do advise counseling & resume lexapro. FUP 4WK. Stay active with excercise & physical activity of choice.  Will review in follow up if wellbutrin add on needed at all (sister on wellbutrin and doing well)  Orders:  -     escitalopram (LEXAPRO) 5 MG tablet; Take 1 tablet (5 mg) by mouth daily  -     MENTAL HEALTH REFERRAL  - Adult; Outpatient Treatment; Individual/Couples/Family/Group Therapy/Health Psychology; G: [facility] [phone]; We will contact you to schedule the appointment or please call with any questions    Gastroesophageal reflux disease without esophagitis  -     omeprazole (PRILOSEC) 20 MG DR capsule; Take 1 capsule (20 mg) by mouth daily    Elevated BP without diagnosis of hypertension  Counseled  Please see patient instructions   Follow up in 4 weeks, earlier as needed    Family planning  -     LORYNA 3-0.02 MG tablet; Take 1 tablet by mouth daily  Potential medication side effects were discussed with the patient; let me know if any occur.    High risk human papilloma virus (HPV) infection of cervix  -     Pap imaged thin layer diagnostic with HPV (select HPV order below)  -     HPV High Risk Types DNA Cervical  If positive for HPV- will need colposcopy           Patient has been advised of split billing requirements and indicates understanding: Yes  COUNSELING:  Reviewed preventive health counseling, as reflected in patient instructions       Regular exercise       Healthy diet/nutrition       Vision screening       Hearing screening       Contraception       Family planning       Folic Acid       Consider Hep C screening for all patients one time for ages 18-79 years       HIV screeninx in teen years, 1x in adult years, and at intervals if high risk       Advance Care  "Planning    Estimated body mass index is 25.3 kg/m  as calculated from the following:    Height as of this encounter: 1.753 m (5' 9\").    Weight as of this encounter: 77.7 kg (171 lb 4.8 oz).    Weight management plan: Discussed healthy diet and exercise guidelines    She reports that she has never smoked. She has never used smokeless tobacco.      Counseling Resources:  ATP IV Guidelines  Pooled Cohorts Equation Calculator  Breast Cancer Risk Calculator  BRCA-Related Cancer Risk Assessment: FHS-7 Tool  FRAX Risk Assessment  ICSI Preventive Guidelines  Dietary Guidelines for Americans, 2010  USDA's MyPlate  ASA Prophylaxis  Lung CA Screening    Serina Munguia MD  [facility]  "

## 2021-06-08 NOTE — PATIENT INSTRUCTIONS
Preventive Health Recommendations  Female Ages 26 - 39  Yearly exam:   See your health care provider every year in order to    Review health changes.     Discuss preventive care.      Review your medicines if you your doctor has prescribed any.    Until age 30: Get a Pap test every three years (more often if you have had an abnormal result).    After age 30: Talk to your doctor about whether you should have a Pap test every 3 years or have a Pap test with HPV screening every 5 years.   You do not need a Pap test if your uterus was removed (hysterectomy) and you have not had cancer.  You should be tested each year for STDs (sexually transmitted diseases), if you're at risk.   Talk to your provider about how often to have your cholesterol checked.  If you are at risk for diabetes, you should have a diabetes test (fasting glucose).  Shots: Get a flu shot each year. Get a tetanus shot every 10 years.   Nutrition:     Eat at least 5 servings of fruits and vegetables each day.    Eat whole-grain bread, whole-wheat pasta and brown rice instead of white grains and rice.    Get adequate Calcium and Vitamin D.     Lifestyle    Exercise at least 150 minutes a week (30 minutes a day, 5 days of the week). This will help you control your weight and prevent disease.    Limit alcohol to one drink per day.    No smoking.     Wear sunscreen to prevent skin cancer.    See your dentist every six months for an exam and cleaning.      -Normal  BP is  119/79 or lower. Upper number is systolic Blood pressure (SBP). Lower number is diastolic  Blood pressure (DBP)  -Blood pressure between 120-139/80-89 (prehypertensive blood pressure/ class 1 hypertension )   -Hypertensive is  BP of 140/90 or above and needs treatment with medication.  -life style changes that are beneficial to reach goal of normal Blood pressure   1.Weight loss of 1 kg can reduce systolic Blood pressure  (SBP) by 1 mmHg  2.Health healthy diet (eg DASH dietary pattern can  reduce SBP by 11 mmHg)  3.Structured excercise program (150 mins aerobic activity/week can reduce SBP by 5 mmHg)  4.Low salt  diet - very important.(eg: cut by 255 or 1000 mg/day to reduce SBP by 5mmHg)  5. Increase 4-5 serving of fresh vegetables & fruits /day- good source of potassium.    Other beneficial tips. Complete smoke cessation- if smoking  Reduction of alcohol     if More than 140/90  after a month of above life style changes  Return visit with MD/provider  for recheck & consideration of medications .

## 2021-06-09 ENCOUNTER — OFFICE VISIT (OUTPATIENT)
Dept: FAMILY MEDICINE | Facility: CLINIC | Age: 31
End: 2021-06-09
Payer: COMMERCIAL

## 2021-06-09 VITALS
RESPIRATION RATE: 16 BRPM | SYSTOLIC BLOOD PRESSURE: 137 MMHG | WEIGHT: 171.3 LBS | OXYGEN SATURATION: 100 % | HEART RATE: 111 BPM | TEMPERATURE: 98.5 F | BODY MASS INDEX: 25.37 KG/M2 | DIASTOLIC BLOOD PRESSURE: 92 MMHG | HEIGHT: 69 IN

## 2021-06-09 DIAGNOSIS — F41.9 ANXIETY: ICD-10-CM

## 2021-06-09 DIAGNOSIS — B97.7 HIGH RISK HUMAN PAPILLOMA VIRUS (HPV) INFECTION OF CERVIX: ICD-10-CM

## 2021-06-09 DIAGNOSIS — Z30.09 FAMILY PLANNING: ICD-10-CM

## 2021-06-09 DIAGNOSIS — K21.9 GASTROESOPHAGEAL REFLUX DISEASE WITHOUT ESOPHAGITIS: ICD-10-CM

## 2021-06-09 DIAGNOSIS — Z00.00 ROUTINE GENERAL MEDICAL EXAMINATION AT A HEALTH CARE FACILITY: Primary | ICD-10-CM

## 2021-06-09 DIAGNOSIS — N72 HIGH RISK HUMAN PAPILLOMA VIRUS (HPV) INFECTION OF CERVIX: ICD-10-CM

## 2021-06-09 DIAGNOSIS — R03.0 ELEVATED BP WITHOUT DIAGNOSIS OF HYPERTENSION: ICD-10-CM

## 2021-06-09 PROCEDURE — 88175 CYTOPATH C/V AUTO FLUID REDO: CPT | Performed by: FAMILY MEDICINE

## 2021-06-09 PROCEDURE — 96127 BRIEF EMOTIONAL/BEHAV ASSMT: CPT | Performed by: FAMILY MEDICINE

## 2021-06-09 PROCEDURE — 99395 PREV VISIT EST AGE 18-39: CPT | Performed by: FAMILY MEDICINE

## 2021-06-09 PROCEDURE — 87624 HPV HI-RISK TYP POOLED RSLT: CPT | Performed by: FAMILY MEDICINE

## 2021-06-09 PROCEDURE — 88141 CYTOPATH C/V INTERPRET: CPT

## 2021-06-09 PROCEDURE — 99213 OFFICE O/P EST LOW 20 MIN: CPT | Mod: 25 | Performed by: FAMILY MEDICINE

## 2021-06-09 RX ORDER — ESCITALOPRAM OXALATE 5 MG/1
5 TABLET ORAL DAILY
Qty: 90 TABLET | Refills: 0 | Status: SHIPPED | OUTPATIENT
Start: 2021-06-09 | End: 2021-07-07

## 2021-06-09 RX ORDER — DROSPIRENONE AND ETHINYL ESTRADIOL TABLETS 0.02-3(28)
1 KIT ORAL DAILY
Qty: 90 TABLET | Refills: 3 | Status: SHIPPED | OUTPATIENT
Start: 2021-06-09 | End: 2022-06-13

## 2021-06-09 ASSESSMENT — ANXIETY QUESTIONNAIRES
1. FEELING NERVOUS, ANXIOUS, OR ON EDGE: SEVERAL DAYS
7. FEELING AFRAID AS IF SOMETHING AWFUL MIGHT HAPPEN: NOT AT ALL
5. BEING SO RESTLESS THAT IT IS HARD TO SIT STILL: NOT AT ALL
GAD7 TOTAL SCORE: 4
7. FEELING AFRAID AS IF SOMETHING AWFUL MIGHT HAPPEN: NOT AT ALL
4. TROUBLE RELAXING: NOT AT ALL
3. WORRYING TOO MUCH ABOUT DIFFERENT THINGS: SEVERAL DAYS
6. BECOMING EASILY ANNOYED OR IRRITABLE: SEVERAL DAYS
2. NOT BEING ABLE TO STOP OR CONTROL WORRYING: SEVERAL DAYS

## 2021-06-09 ASSESSMENT — MIFFLIN-ST. JEOR: SCORE: 1566.39

## 2021-06-09 ASSESSMENT — PATIENT HEALTH QUESTIONNAIRE - PHQ9
SUM OF ALL RESPONSES TO PHQ QUESTIONS 1-9: 0
10. IF YOU CHECKED OFF ANY PROBLEMS, HOW DIFFICULT HAVE THESE PROBLEMS MADE IT FOR YOU TO DO YOUR WORK, TAKE CARE OF THINGS AT HOME, OR GET ALONG WITH OTHER PEOPLE: NOT DIFFICULT AT ALL
SUM OF ALL RESPONSES TO PHQ QUESTIONS 1-9: 0

## 2021-06-10 ASSESSMENT — ANXIETY QUESTIONNAIRES: GAD7 TOTAL SCORE: 4

## 2021-06-14 ENCOUNTER — HOSPITAL ENCOUNTER (EMERGENCY)
Facility: CLINIC | Age: 31
Discharge: HOME | End: 2021-06-14
Attending: INTERNAL MEDICINE | Admitting: INTERNAL MEDICINE
Payer: COMMERCIAL

## 2021-06-14 ENCOUNTER — APPOINTMENT (OUTPATIENT)
Dept: GENERAL RADIOLOGY | Facility: CLINIC | Age: 31
End: 2021-06-14
Attending: INTERNAL MEDICINE
Payer: COMMERCIAL

## 2021-06-14 VITALS
SYSTOLIC BLOOD PRESSURE: 145 MMHG | DIASTOLIC BLOOD PRESSURE: 104 MMHG | WEIGHT: 170 LBS | TEMPERATURE: 97.3 F | HEART RATE: 75 BPM | RESPIRATION RATE: 16 BRPM | BODY MASS INDEX: 25.18 KG/M2 | HEIGHT: 69 IN | OXYGEN SATURATION: 98 %

## 2021-06-14 DIAGNOSIS — G43.109 MIGRAINE WITH AURA AND WITHOUT STATUS MIGRAINOSUS, NOT INTRACTABLE: ICD-10-CM

## 2021-06-14 PROBLEM — R03.0 ELEVATED BP WITHOUT DIAGNOSIS OF HYPERTENSION: Status: ACTIVE | Noted: 2021-06-14

## 2021-06-14 LAB
ALBUMIN SERPL-MCNC: 3.8 G/DL (ref 3.4–5)
ALP SERPL-CCNC: 41 U/L (ref 40–150)
ALT SERPL W P-5'-P-CCNC: 20 U/L (ref 0–50)
ANION GAP SERPL CALCULATED.3IONS-SCNC: 9 MMOL/L (ref 3–14)
AST SERPL W P-5'-P-CCNC: 17 U/L (ref 0–45)
BASOPHILS # BLD AUTO: 0.1 10E9/L (ref 0–0.2)
BASOPHILS NFR BLD AUTO: 0.6 %
BILIRUB SERPL-MCNC: 0.4 MG/DL (ref 0.2–1.3)
BUN SERPL-MCNC: 6 MG/DL (ref 7–30)
CALCIUM SERPL-MCNC: 9 MG/DL (ref 8.5–10.1)
CHLORIDE SERPL-SCNC: 103 MMOL/L (ref 94–109)
CO2 SERPL-SCNC: 22 MMOL/L (ref 20–32)
COPATH REPORT: ABNORMAL
CREAT SERPL-MCNC: 0.64 MG/DL (ref 0.52–1.04)
D DIMER PPP FEU-MCNC: <0.3 UG/ML FEU (ref 0–0.5)
DIFFERENTIAL METHOD BLD: NORMAL
EOSINOPHIL # BLD AUTO: 0.2 10E9/L (ref 0–0.7)
EOSINOPHIL NFR BLD AUTO: 1.7 %
ERYTHROCYTE [DISTWIDTH] IN BLOOD BY AUTOMATED COUNT: 12.2 % (ref 10–15)
GFR SERPL CREATININE-BSD FRML MDRD: >90 ML/MIN/{1.73_M2}
GLUCOSE SERPL-MCNC: 100 MG/DL (ref 70–99)
HCG SERPL QL: NEGATIVE
HCT VFR BLD AUTO: 39.8 % (ref 35–47)
HGB BLD-MCNC: 13.5 G/DL (ref 11.7–15.7)
IMM GRANULOCYTES # BLD: 0 10E9/L (ref 0–0.4)
IMM GRANULOCYTES NFR BLD: 0.2 %
INR PPP: 0.96 (ref 0.86–1.14)
INTERPRETATION ECG - MUSE: NORMAL
LYMPHOCYTES # BLD AUTO: 2.8 10E9/L (ref 0.8–5.3)
LYMPHOCYTES NFR BLD AUTO: 31.3 %
MAGNESIUM SERPL-MCNC: 1.8 MG/DL (ref 1.6–2.3)
MCH RBC QN AUTO: 30.1 PG (ref 26.5–33)
MCHC RBC AUTO-ENTMCNC: 33.9 G/DL (ref 31.5–36.5)
MCV RBC AUTO: 89 FL (ref 78–100)
MONOCYTES # BLD AUTO: 0.5 10E9/L (ref 0–1.3)
MONOCYTES NFR BLD AUTO: 5.5 %
NEUTROPHILS # BLD AUTO: 5.5 10E9/L (ref 1.6–8.3)
NEUTROPHILS NFR BLD AUTO: 60.7 %
NRBC # BLD AUTO: 0 10*3/UL
NRBC BLD AUTO-RTO: 0 /100
PAP: ABNORMAL
PLATELET # BLD AUTO: 259 10E9/L (ref 150–450)
POTASSIUM SERPL-SCNC: 3.2 MMOL/L (ref 3.4–5.3)
PROT SERPL-MCNC: 7.6 G/DL (ref 6.8–8.8)
RBC # BLD AUTO: 4.49 10E12/L (ref 3.8–5.2)
SODIUM SERPL-SCNC: 134 MMOL/L (ref 133–144)
TROPONIN I SERPL-MCNC: <0.015 UG/L (ref 0–0.04)
WBC # BLD AUTO: 9 10E9/L (ref 4–11)

## 2021-06-14 PROCEDURE — 84484 ASSAY OF TROPONIN QUANT: CPT | Performed by: INTERNAL MEDICINE

## 2021-06-14 PROCEDURE — 85379 FIBRIN DEGRADATION QUANT: CPT | Performed by: INTERNAL MEDICINE

## 2021-06-14 PROCEDURE — 71045 X-RAY EXAM CHEST 1 VIEW: CPT | Mod: 26 | Performed by: RADIOLOGY

## 2021-06-14 PROCEDURE — 93005 ELECTROCARDIOGRAM TRACING: CPT | Performed by: INTERNAL MEDICINE

## 2021-06-14 PROCEDURE — 99285 EMERGENCY DEPT VISIT HI MDM: CPT | Mod: 25 | Performed by: INTERNAL MEDICINE

## 2021-06-14 PROCEDURE — 85025 COMPLETE CBC W/AUTO DIFF WBC: CPT | Performed by: INTERNAL MEDICINE

## 2021-06-14 PROCEDURE — 83735 ASSAY OF MAGNESIUM: CPT | Performed by: INTERNAL MEDICINE

## 2021-06-14 PROCEDURE — 71045 X-RAY EXAM CHEST 1 VIEW: CPT

## 2021-06-14 PROCEDURE — 85610 PROTHROMBIN TIME: CPT | Performed by: INTERNAL MEDICINE

## 2021-06-14 PROCEDURE — 80053 COMPREHEN METABOLIC PANEL: CPT | Performed by: INTERNAL MEDICINE

## 2021-06-14 PROCEDURE — 84703 CHORIONIC GONADOTROPIN ASSAY: CPT | Performed by: INTERNAL MEDICINE

## 2021-06-14 PROCEDURE — 93010 ELECTROCARDIOGRAM REPORT: CPT | Performed by: INTERNAL MEDICINE

## 2021-06-14 ASSESSMENT — ENCOUNTER SYMPTOMS
NECK STIFFNESS: 0
HEADACHES: 0
SHORTNESS OF BREATH: 0
ABDOMINAL PAIN: 0
ARTHRALGIAS: 0
LIGHT-HEADEDNESS: 1
DIZZINESS: 1
FEVER: 0
DIFFICULTY URINATING: 0
EYE REDNESS: 0
COLOR CHANGE: 0
NERVOUS/ANXIOUS: 1
CONFUSION: 0

## 2021-06-14 ASSESSMENT — MIFFLIN-ST. JEOR: SCORE: 1560.49

## 2021-06-14 NOTE — ED TRIAGE NOTES
Pt was sitting at work, sudden onset of left eye floater, dizziness/light-headed, palpitations  Laid down and felt better  No n/v, no syncope, no vertigo, no recent head injuries, non-diabetic  Was off her lexapro 3 months, restarted it last week. No other new meds  LMP 2 weeks ago, denies pregnancy

## 2021-06-14 NOTE — ED PROVIDER NOTES
[place] EMERGENCY DEPARTMENT ([facility])  6/14/21  History     Chief Complaint   Patient presents with     Dizziness     The history is provided by the patient and medical records.     Beata Calderon is a 30 year old female with a history notable for anxiety and GERD who presents to the ED for evaluation of dizziness.  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  She reports feeling normal this morning when she suddenly noticed a squiggly line in her left lateral vision.  A few minutes, around 12:50 PM, the patient suddenly became dizzy, lightheaded and experienced palpitations while seated in the meeting.  Patient denies associated headache, chest pain or shortness of breath.  She notes the visual disturbance in her left eye is still present, although less noticeable.  She denies ever experiencing double vision.  Patient does note having anxiety at baseline and has been seen by her PCP for HTN.  Patient endorses taking Lexapro and omeprazole as well as the same birth control for the past 10 years.  Patient denies a history of PE/DVT.  She currently denies leg pain or swelling.  She notes her father has a history of pulmonary embolism.  She otherwise denies a significant past medical history.      I have reviewed the Medications, Allergies, Past Medical and Surgical History, and Social History in the [employer] system.  PAST MEDICAL HISTORY:   Past Medical History:   Diagnosis Date     Abnormal cervical Papanicolaou smear 05/16/2018 5/16/18 ASCUS, +HR HPV, not 16/18     Cervical high risk HPV (human papillomavirus) test positive 05/16/2018    See problem list.      Cervical high risk HPV (human papillomavirus) test positive 05/22/2019, 07/15/20    See problem list.      Depressive disorder January 2020     Hypertension April 2021       PAST SURGICAL HISTORY:   Past Surgical History:   Procedure Laterality Date     ENT SURGERY  2012    Tonsillectomy       Past medical history,  past surgical history, medications, and allergies were reviewed with the patient. Additional pertinent items: None    FAMILY HISTORY:   Family History   Problem Relation Age of Onset     Hyperlipidemia Mother      Thyroid Disease Mother      Depression Father      Depression Sister      Diabetes Maternal Grandfather      Hypertension Maternal Grandmother         Grandma, grandpa, uncle, and aunt have h/o high BP despite being healthy/active.       SOCIAL HISTORY:   Social History     Tobacco Use     Smoking status: Never Smoker     Smokeless tobacco: Never Used   Substance Use Topics     Alcohol use: Yes     Alcohol/week: 5.0 standard drinks     Social history was reviewed with the patient. Additional pertinent items: None      Patient's Medications   New Prescriptions    No medications on file   Previous Medications    ESCITALOPRAM (LEXAPRO) 5 MG TABLET    Take 1 tablet (5 mg) by mouth daily    LORYNA 3-0.02 MG TABLET    Take 1 tablet by mouth daily    OMEPRAZOLE (PRILOSEC) 20 MG DR CAPSULE    Take 1 capsule (20 mg) by mouth daily   Modified Medications    No medications on file   Discontinued Medications    No medications on file          Allergies   Allergen Reactions     Cephalosporins Nausea and Vomiting     No Clinical Screening - See Comments Other (See Comments)     Pollens and animal dander        Review of Systems   Constitutional: Negative for fever.   HENT: Negative for congestion.    Eyes: Positive for visual disturbance. Negative for redness.   Respiratory: Negative for shortness of breath.    Cardiovascular: Negative for chest pain.   Gastrointestinal: Negative for abdominal pain.   Genitourinary: Negative for difficulty urinating.   Musculoskeletal: Negative for arthralgias and neck stiffness.   Skin: Negative for color change.   Neurological: Positive for dizziness and light-headedness. Negative for headaches.   Psychiatric/Behavioral: Negative for confusion. The patient is nervous/anxious.      A  "complete review of systems was performed with pertinent positives and negatives noted in the HPI, and all other systems negative.    Physical Exam   BP: (!) 199/99  Pulse: 97  Temp: 98.6  F (37  C)  Resp: 16  Height: 175.3 cm (5' 9\")  Weight: 77.1 kg (170 lb)  SpO2: 98 %      Physical Exam  Constitutional:       General: She is not in acute distress.     Appearance: She is not diaphoretic.   HENT:      Head: Atraumatic.      Mouth/Throat:      Pharynx: No oropharyngeal exudate.   Eyes:      General: No scleral icterus.     Pupils: Pupils are equal, round, and reactive to light.   Neck:      Musculoskeletal: Neck supple.   Cardiovascular:      Rate and Rhythm: Normal rate and regular rhythm.      Heart sounds: Normal heart sounds. No murmur. No friction rub. No gallop.    Pulmonary:      Effort: Pulmonary effort is normal. No respiratory distress.      Breath sounds: Normal breath sounds. No stridor. No wheezing, rhonchi or rales.   Chest:      Chest wall: No tenderness.   Abdominal:      General: Abdomen is flat. Bowel sounds are normal. There is no distension.      Palpations: Abdomen is soft. There is no mass.      Tenderness: There is no abdominal tenderness. There is no right CVA tenderness, left CVA tenderness, guarding or rebound.      Hernia: No hernia is present.   Musculoskeletal:         General: No tenderness.   Skin:     General: Skin is warm.      Findings: No rash.   Neurological:      General: No focal deficit present.      Mental Status: She is oriented to person, place, and time.      Cranial Nerves: No cranial nerve deficit.      Sensory: No sensory deficit.      Motor: No weakness.      Coordination: Coordination normal.      Gait: Gait normal.      Deep Tendon Reflexes: Reflexes normal.   Psychiatric:         Mood and Affect: Mood normal.         Thought Content: Thought content normal.         ED Course   1:28 PM  The patient was seen and examined by Dr Wilson in Room 21.      Procedures         "     EKG Interpretation:      Interpreted by Isa Wilson MD, MD  Time reviewed: on arrival  Symptoms at time of EKG: dizziness   Rhythm: normal sinus   Rate: normal  Axis: normal  Ectopy: none  Conduction: normal  ST Segments/ T Waves: No ST-T wave changes  Q Waves: none  Comparison to prior: No old EKG available    Clinical Impression: normal EKG              Results for orders placed or performed during the hospital encounter of 06/14/21 (from the past 24 hour(s))   EKG 12-lead, tracing only   Result Value Ref Range    Interpretation ECG Click View Image link to view waveform and result    CBC with platelets differential   Result Value Ref Range    WBC 9.0 4.0 - 11.0 10e9/L    RBC Count 4.49 3.8 - 5.2 10e12/L    Hemoglobin 13.5 11.7 - 15.7 g/dL    Hematocrit 39.8 35.0 - 47.0 %    MCV 89 78 - 100 fl    MCH 30.1 26.5 - 33.0 pg    MCHC 33.9 31.5 - 36.5 g/dL    RDW 12.2 10.0 - 15.0 %    Platelet Count 259 150 - 450 10e9/L    Diff Method Automated Method     % Neutrophils 60.7 %    % Lymphocytes 31.3 %    % Monocytes 5.5 %    % Eosinophils 1.7 %    % Basophils 0.6 %    % Immature Granulocytes 0.2 %    Nucleated RBCs 0 0 /100    Absolute Neutrophil 5.5 1.6 - 8.3 10e9/L    Absolute Lymphocytes 2.8 0.8 - 5.3 10e9/L    Absolute Monocytes 0.5 0.0 - 1.3 10e9/L    Absolute Eosinophils 0.2 0.0 - 0.7 10e9/L    Absolute Basophils 0.1 0.0 - 0.2 10e9/L    Abs Immature Granulocytes 0.0 0 - 0.4 10e9/L    Absolute Nucleated RBC 0.0    INR   Result Value Ref Range    INR 0.96 0.86 - 1.14   Comprehensive metabolic panel   Result Value Ref Range    Sodium 134 133 - 144 mmol/L    Potassium 3.2 (L) 3.4 - 5.3 mmol/L    Chloride 103 94 - 109 mmol/L    Carbon Dioxide 22 20 - 32 mmol/L    Anion Gap 9 3 - 14 mmol/L    Glucose 100 (H) 70 - 99 mg/dL    Urea Nitrogen 6 (L) 7 - 30 mg/dL    Creatinine 0.64 0.52 - 1.04 mg/dL    GFR Estimate >90 >60 mL/min/[1.73_m2]    GFR Estimate If Black >90 >60 mL/min/[1.73_m2]    Calcium 9.0 8.5 - 10.1 mg/dL     Bilirubin Total 0.4 0.2 - 1.3 mg/dL    Albumin 3.8 3.4 - 5.0 g/dL    Protein Total 7.6 6.8 - 8.8 g/dL    Alkaline Phosphatase 41 40 - 150 U/L    ALT 20 0 - 50 U/L    AST 17 0 - 45 U/L   Troponin I   Result Value Ref Range    Troponin I ES <0.015 0.000 - 0.045 ug/L   Magnesium   Result Value Ref Range    Magnesium 1.8 1.6 - 2.3 mg/dL   HCG qualitative pregnancy (blood)   Result Value Ref Range    HCG Qualitative Serum Negative NEG^Negative   D dimer quantitative   Result Value Ref Range    D Dimer <0.3 0.0 - 0.50 ug/ml FEU   XR Chest Port 1 View    Narrative    Exam: XR CHEST PORT 1 VIEW, 6/14/2021 1:53 PM    Indication: palp    Comparison: None    Findings:   Heart and pulmonary vasculature within normal limits. Lungs are clear.  Pleural spaces are clear. Bones and soft tissues unremarkable.      Impression    Impression: No cardiopulmonary disease identified.    ALOK OTT MD     Medications - No data to display          Assessments & Plan (with Medical Decision Making)    Acute visual problem with dizziness then HA, lasted about 30 plus min, initially BP high then came down to 's her baseline, EKG without acute pathologies, labs including trop and d dimer neg, CXR neg, clinically most consistent with migraine with aura, in view of her young age and complete resolution of symptoms, possible benefits does not outweigh the possible risks, skip CT head and this was informed to the pt and her fiance, will discharge and follow up with her PMD.         I have reviewed the nursing notes.    I have reviewed the findings, diagnosis, plan and need for follow up with the patient.    New Prescriptions    No medications on file       Final diagnoses:   Migraine with aura and without status migrainosus, not intractable     I, Julian Watts, am serving as a trained medical scribe to document services personally performed by Isa Wilson MD, based on the provider's statements to me.      I, Isa Wilson MD, was  physically present and have reviewed and verified the accuracy of this note documented by Julian Watts.     6/14/2021   [facility] EMERGENCY DEPARTMENT     Isa Wilson MD  06/14/21 1954

## 2021-06-14 NOTE — ASSESSMENT & PLAN NOTE
Lexapro helped in past. she was able to stop for 3 months and anxiety recurred, treatment options discussed and i do advise counseling & resume lexapro. FUP 4W

## 2021-06-16 ENCOUNTER — MYC MEDICAL ADVICE (OUTPATIENT)
Dept: FAMILY MEDICINE | Facility: CLINIC | Age: 31
End: 2021-06-16

## 2021-06-16 ENCOUNTER — PATIENT OUTREACH (OUTPATIENT)
Dept: FAMILY MEDICINE | Facility: CLINIC | Age: 31
End: 2021-06-16

## 2021-06-17 NOTE — TELEPHONE ENCOUNTER
Msg left on voice mail to return call or advised patient to review results and recommendations that were released through [employer].

## 2021-06-24 NOTE — PROGRESS NOTES
INDICATIONS:                                                    Is a pregnancy test required: Yes.  Was it positive or negative?  Negative  Was a consent obtained?  Yes    Today's PHQ-2 Score:   PHQ-2 ( 1999 Pfizer) 6/8/2021   Q1: Little interest or pleasure in doing things 0   Q2: Feeling down, depressed or hopeless 0   PHQ-2 Score 0   Q1: Little interest or pleasure in doing things Not at all   Q2: Feeling down, depressed or hopeless Not at all   PHQ-2 Score 0       Beata Calderon, is a 30 year old female, who had a recent ASCUS pap.  HPV Other positive Yes prior history of abnormal pap. Here today for colposcopy. Discussed indication, risks of infection and bleeding.    Her last pap was   Lab Results   Component Value Date    PAP ASC-US, HPV+ 06/09/2021     PAP HISTORY:  2015 NIL. 2018 ASCUS/HPV other pos->colpo benign. 2019 NIL/HPV other pos->colpo neg ECC. 2020 NIL/HPV other pos. 6/21 ASCUS/HPV other pos    PROCEDURE:                                                      Cervix is stained with acetic acid and viewed colposcopically. Squamocolumnar junction is visualized in it's entirety. no visible lesions . Biopsy done Yes. Endocervical curretage Done       POST PROCEDURE:                                                      IMPRESSION: Patient tolerated procedure well, colposcopy adequate and Normal cervix    PLAN : Await the results of the biopsies.  She tolerated the procedure well. There were no complications. Patient was discharged in stable condition.    Patient advised to call the clinic if excessive bleeding, pelvic pain, or fever.     Follow-up based on pathology results.  Bing Pickens Masters, DO

## 2021-07-06 NOTE — PROGRESS NOTES
Beata is a 30 year old who is being evaluated via a billable video visit.      How would you like to obtain your AVS? MyChart  If the video visit is dropped, the invitation should be resent by: Text to cell phone: [phone]  Will anyone else be joining your video visit? No      Video Start Time: 5:41 PM    Assessment & Plan   30 year old female with know history of anxiety- improving on lexapro , she has been on & off of lexapro for years and resumed again since 6/9/21 at 5 mg and increased to 10 mg about 2 weeks ago  She also has a recent emergency department  Encounter for dizziness due to high Blood pressure, that improved as she calm down and also experiencing and new onset scotoma ? Ocular migraine headache  .    (F41.9) Anxiety  (primary encounter diagnosis)  Comment: lexapro has helped and I do recommend to increase the dose from  10 mg to 20 mg and recheck in about 4 weeks, recheck earlier if concerns with mood, medications or headache   Plan: escitalopram (LEXAPRO) 20 MG tablet once daily . 3 refllls given   encouraged her to start counseling    (H53.412) Visual field scotoma of left eye. Possibly due to (G43.109) Ocular migraine  Comment: 1st episode  - left eye 6/14, EMERGENCY DEPARTMENT visit reviewed - high Blood pressure    2nd epiosde 7/7- mild today.  Plan: scotoma/ headache  diary- see ophthalmologist to make sure no concerning signs of neuritis  Scotoma possibly due to ocular migraine   Older sister has moderate migraine headache   Plan: Keep headache /scotoma diary to avoid triggers if identifiable.  See ophthalmologist for detail eye exam to rule out addition causes of concern.      Elevated Blood pressure without diagnoses of hypertension   Plan :Keep Blood pressure record,once a week.   Target BP < 139/89      30 minutes spent on the date of the encounter doing chart review, history and exam, documentation and further activities per the note           Return in about 4 weeks (around 8/4/2021)  for mood, medications recheck/review/refill, concerns,unresolved, virtual/video visit.    Serina Munguia MD  [facility]    Yan Cota is a 30 year old who presents for the following health issues   new onset scotoma ? Ocular migraine headache and elevated Blood pressure without diagnoses of hypertension- follow-up regarding medications     HPI   30 year old female with know history of anxiety- improving on lexapro , she has been on & off of lexapro for years and resumed again since 6/9/21 at 5 mg and increased to 10 mg about 2 weeks ago  She also has a recent emergency department  Encounter for dizziness due to high Blood pressure, that improved as she calm down and also experiencing and new onset scotoma ? Ocular migraine headache  .      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    Do you check your blood pressure regularly outside of the clinic? No     Are you following a low salt diet? Yes    Are your blood pressures ever more than 140 on the top number (systolic) OR more   than 90 on the bottom number (diastolic), for example 140/90? No    Anxiety Follow-Up has been on lexapro for 4 weeks and increased dose to 10 mg for 2 weeks     How are you doing with your anxiety since your last visit?  A little better     Are you having other symptoms that might be associated with anxiety? No    Have you had a significant life event? OTHER:  soon      Are you feeling depressed? Yes:  a little bit     Do you have any concerns with your use of alcohol or other drugs? No    Social History     Tobacco Use     Smoking status: Never Smoker     Smokeless tobacco: Never Used   Substance Use Topics     Alcohol use: Yes     Alcohol/week: 5.0 standard drinks     Drug use: No     ANNEMARIE-7 SCORE 2/3/2021 6/9/2021 7/7/2021   Total Score 0 (minimal anxiety) 4 (minimal anxiety) -   Total Score 0 4 8     PHQ  7/15/2020 2/3/2021 6/9/2021   PHQ-9 Total Score 0 1 0   Q9: Thoughts of better off dead/self-harm past 2 weeks Not at all Not at all Not at all   F/U: Thoughts of suicide or self-harm - - -   F/U: Self harm-plan - - -   F/U: Self-harm action - - -   F/U: Safety concerns - - -         How many servings of fruits and vegetables do you eat daily?  3     On average, how many sweetened beverages do you drink each day (Examples: soda, juice, sweet tea, etc.  Do NOT count diet or artificially sweetened beverages)?   About 3 soda cans a week     How many days per week do you exercise enough to make your heart beat faster? 3-4    How many minutes a day do you exercise enough to make your heart beat faster? 30 - 60    How many days per week do you miss taking your medication? 0        Review of Systems emergency department visit 6/14- high Blood pressure and went to be seen from work because of zigzaggy scotoma on left eye   Stress at work- increase the Blood pressure   Constitutional, HEENT, cardiovascular, pulmonary, GI, , musculoskeletal, neuro, skin, endocrine and psych systems are negative, except as otherwise noted.      Objective           Vitals:  No vitals were obtained today due to virtual visit.    Physical Exam   GENERAL: Healthy, alert and no distress  EYES: Eyes grossly normal to inspection.  No discharge or erythema, or obvious scleral/conjunctival abnormalities.  RESP: No audible wheeze, cough, or visible cyanosis.  No visible retractions or increased work of breathing.    SKIN: Visible skin clear. No significant rash, abnormal pigmentation or lesions.  NEURO: Cranial nerves grossly intact.  Mentation and speech appropriate for age.  PSYCH: Mentation appears normal, affect normal/bright, judgement and insight intact, normal speech and appearance well-groomed.                Video-Visit Details    Type of service:  Video Visit    Video End Time:6:00 PM    Originating Location (pt. Location): Home    Distant  Location (provider location):  [facility]     Platform used for Video Visit: [employer]

## 2021-07-07 ENCOUNTER — VIRTUAL VISIT (OUTPATIENT)
Dept: FAMILY MEDICINE | Facility: CLINIC | Age: 31
End: 2021-07-07
Payer: COMMERCIAL

## 2021-07-07 DIAGNOSIS — G43.109 OCULAR MIGRAINE: ICD-10-CM

## 2021-07-07 DIAGNOSIS — F41.9 ANXIETY: Primary | ICD-10-CM

## 2021-07-07 DIAGNOSIS — H53.412 VISUAL FIELD SCOTOMA OF LEFT EYE: ICD-10-CM

## 2021-07-07 PROBLEM — R87.610 ASCUS WITH POSITIVE HIGH RISK HPV CERVICAL: Status: RESOLVED | Noted: 2018-05-16 | Resolved: 2021-07-07

## 2021-07-07 PROBLEM — R87.810 ASCUS WITH POSITIVE HIGH RISK HPV CERVICAL: Status: RESOLVED | Noted: 2018-05-16 | Resolved: 2021-07-07

## 2021-07-07 PROCEDURE — 96127 BRIEF EMOTIONAL/BEHAV ASSMT: CPT | Mod: 95 | Performed by: FAMILY MEDICINE

## 2021-07-07 PROCEDURE — 99214 OFFICE O/P EST MOD 30 MIN: CPT | Mod: 95 | Performed by: FAMILY MEDICINE

## 2021-07-07 RX ORDER — ESCITALOPRAM OXALATE 20 MG/1
20 TABLET ORAL DAILY
Qty: 30 TABLET | Refills: 1 | Status: SHIPPED | OUTPATIENT
Start: 2021-07-07 | End: 2021-09-13

## 2021-07-07 ASSESSMENT — ANXIETY QUESTIONNAIRES
7. FEELING AFRAID AS IF SOMETHING AWFUL MIGHT HAPPEN: NOT AT ALL
6. BECOMING EASILY ANNOYED OR IRRITABLE: SEVERAL DAYS
5. BEING SO RESTLESS THAT IT IS HARD TO SIT STILL: SEVERAL DAYS
GAD7 TOTAL SCORE: 8
3. WORRYING TOO MUCH ABOUT DIFFERENT THINGS: MORE THAN HALF THE DAYS
2. NOT BEING ABLE TO STOP OR CONTROL WORRYING: SEVERAL DAYS
1. FEELING NERVOUS, ANXIOUS, OR ON EDGE: MORE THAN HALF THE DAYS
IF YOU CHECKED OFF ANY PROBLEMS ON THIS QUESTIONNAIRE, HOW DIFFICULT HAVE THESE PROBLEMS MADE IT FOR YOU TO DO YOUR WORK, TAKE CARE OF THINGS AT HOME, OR GET ALONG WITH OTHER PEOPLE: SOMEWHAT DIFFICULT

## 2021-07-07 ASSESSMENT — PATIENT HEALTH QUESTIONNAIRE - PHQ9: 5. POOR APPETITE OR OVEREATING: SEVERAL DAYS

## 2021-07-07 NOTE — PATIENT INSTRUCTIONS
Keep headache /scotoma diary to avoid triggers if identifiable.  See ophthalmologist for detail eye exam to rule out addition causes of concern.  Keep Blood pressure record,once a week.   Target BP < 139/89      Increase lexapro 20 mg once daily & follow up in 3-4 weeks  Earlier if concerns with headache, mood or medications

## 2021-07-08 ENCOUNTER — TELEPHONE (OUTPATIENT)
Dept: OPHTHALMOLOGY | Facility: CLINIC | Age: 31
End: 2021-07-08

## 2021-07-08 ASSESSMENT — ANXIETY QUESTIONNAIRES: GAD7 TOTAL SCORE: 8

## 2021-07-08 NOTE — TELEPHONE ENCOUNTER
[phone] mobile/home    Left message with direct number at [phone]    Tino Ledbetter RN 8:56 AM 07/09/21    ---      [phone] home/cell  Left message with direct number at [phone]    Tino Ledbetter RN 10:44 AM 07/08/21           Health Call Center    Phone Message    May a detailed message be left on voicemail: no     Reason for Call: Appointment Intake    Referring Provider Name: Serina Munguia MD in [facility]  Diagnosis and/or Symptoms: Ocular migraine [G43.109]  Visual field scotoma of left eye [H53.412]    Additional Information:   left eye scotoma, new onset- 2 episodes. ? ocular migraine . detail exam to rule out neuritis       Action Taken: Message routed to:  [facility] [[id number]] - since Neuritis is mentioned, routing to clinic pool for handling per protocols    Travel Screening: Not Applicable

## 2021-07-09 ENCOUNTER — OFFICE VISIT (OUTPATIENT)
Dept: OBGYN | Facility: CLINIC | Age: 31
End: 2021-07-09
Payer: COMMERCIAL

## 2021-07-09 VITALS
SYSTOLIC BLOOD PRESSURE: 166 MMHG | HEIGHT: 69 IN | DIASTOLIC BLOOD PRESSURE: 90 MMHG | BODY MASS INDEX: 25.18 KG/M2 | WEIGHT: 170 LBS

## 2021-07-09 DIAGNOSIS — R87.810 ASCUS WITH POSITIVE HIGH RISK HPV CERVICAL: Primary | ICD-10-CM

## 2021-07-09 DIAGNOSIS — Z01.812 PRE-PROCEDURE LAB EXAM: ICD-10-CM

## 2021-07-09 DIAGNOSIS — R87.610 ASCUS WITH POSITIVE HIGH RISK HPV CERVICAL: Primary | ICD-10-CM

## 2021-07-09 LAB — HCG UR QL: NEGATIVE

## 2021-07-09 PROCEDURE — 57454 BX/CURETT OF CERVIX W/SCOPE: CPT | Performed by: OBSTETRICS & GYNECOLOGY

## 2021-07-09 PROCEDURE — 81025 URINE PREGNANCY TEST: CPT | Performed by: OBSTETRICS & GYNECOLOGY

## 2021-07-09 PROCEDURE — 88305 TISSUE EXAM BY PATHOLOGIST: CPT | Performed by: PATHOLOGY

## 2021-07-09 PROCEDURE — 88342 IMHCHEM/IMCYTCHM 1ST ANTB: CPT | Performed by: PATHOLOGY

## 2021-07-09 ASSESSMENT — MIFFLIN-ST. JEOR: SCORE: 1560.49

## 2021-07-14 LAB — COPATH REPORT: NORMAL

## 2021-07-15 ENCOUNTER — PATIENT OUTREACH (OUTPATIENT)
Dept: OBGYN | Facility: CLINIC | Age: 31
End: 2021-07-15

## 2021-07-15 DIAGNOSIS — R87.810 ASCUS WITH POSITIVE HIGH RISK HPV CERVICAL: ICD-10-CM

## 2021-07-15 DIAGNOSIS — R87.610 ASCUS WITH POSITIVE HIGH RISK HPV CERVICAL: ICD-10-CM

## 2021-07-15 NOTE — RESULT ENCOUNTER NOTE
Please inform of negative cervical biopsies.  Will plan to repeat pap smear and HPV testing in one year

## 2021-08-23 ENCOUNTER — OFFICE VISIT (OUTPATIENT)
Dept: URGENT CARE | Facility: URGENT CARE | Age: 31
End: 2021-08-23
Payer: COMMERCIAL

## 2021-08-23 VITALS
RESPIRATION RATE: 12 BRPM | SYSTOLIC BLOOD PRESSURE: 138 MMHG | WEIGHT: 170 LBS | BODY MASS INDEX: 25.18 KG/M2 | TEMPERATURE: 98.1 F | HEIGHT: 69 IN | OXYGEN SATURATION: 99 % | HEART RATE: 80 BPM | DIASTOLIC BLOOD PRESSURE: 70 MMHG

## 2021-08-23 DIAGNOSIS — R35.0 URINARY FREQUENCY: ICD-10-CM

## 2021-08-23 DIAGNOSIS — R30.0 DYSURIA: Primary | ICD-10-CM

## 2021-08-23 LAB
ALBUMIN UR-MCNC: NEGATIVE MG/DL
APPEARANCE UR: CLEAR
BACTERIA #/AREA URNS HPF: ABNORMAL /HPF
BILIRUB UR QL STRIP: NEGATIVE
COLOR UR AUTO: YELLOW
GLUCOSE UR STRIP-MCNC: NEGATIVE MG/DL
HGB UR QL STRIP: ABNORMAL
KETONES UR STRIP-MCNC: NEGATIVE MG/DL
LEUKOCYTE ESTERASE UR QL STRIP: NEGATIVE
NITRATE UR QL: NEGATIVE
PH UR STRIP: 5.5 [PH] (ref 5–7)
RBC #/AREA URNS AUTO: ABNORMAL /HPF
SP GR UR STRIP: 1.01 (ref 1–1.03)
SQUAMOUS #/AREA URNS AUTO: ABNORMAL /LPF
UROBILINOGEN UR STRIP-ACNC: 0.2 E.U./DL
WBC #/AREA URNS AUTO: ABNORMAL /HPF

## 2021-08-23 PROCEDURE — 81001 URINALYSIS AUTO W/SCOPE: CPT | Performed by: PHYSICIAN ASSISTANT

## 2021-08-23 PROCEDURE — 99213 OFFICE O/P EST LOW 20 MIN: CPT | Performed by: PHYSICIAN ASSISTANT

## 2021-08-23 RX ORDER — PHENAZOPYRIDINE HYDROCHLORIDE 200 MG/1
200 TABLET, FILM COATED ORAL 3 TIMES DAILY PRN
Qty: 9 TABLET | Refills: 0 | Status: SHIPPED | OUTPATIENT
Start: 2021-08-23 | End: 2021-10-26

## 2021-08-23 RX ORDER — NITROFURANTOIN 25; 75 MG/1; MG/1
100 CAPSULE ORAL 2 TIMES DAILY
Qty: 14 CAPSULE | Refills: 0 | Status: SHIPPED | OUTPATIENT
Start: 2021-08-23 | End: 2021-10-26

## 2021-08-23 ASSESSMENT — MIFFLIN-ST. JEOR: SCORE: 1560.49

## 2021-08-23 NOTE — PROGRESS NOTES
"    Assessment & Plan     Dysuria  UA positive  Urine culture pending  macrobid and pyridium  - UA macro with reflex to Microscopic and Culture - Clinc Collect  - Urine Microscopic  - Urine Culture Aerobic Bacterial - lab collect; Future  - nitroFURantoin macrocrystal-monohydrate (MACROBID) 100 MG capsule; Take 1 capsule (100 mg) by mouth 2 times daily    Urinary frequency  Urine culture pending  - Urine Culture Aerobic Bacterial - lab collect; Future  - phenazopyridine (PYRIDIUM) 200 MG tablet; Take 1 tablet (200 mg) by mouth 3 times daily as needed for irritation    Review of external notes as documented elsewhere in note      No follow-ups on file.    Antonio Obregon PA-C  [facility] SAULO Cota is a 30 year old who presents for the following health issues     HPI     Dysuria and urinary frequency    Review of Systems   Constitutional, HEENT, cardiovascular, pulmonary, gi and gu systems are negative, except as otherwise noted.      Objective    /70   Pulse 80   Temp 98.1  F (36.7  C) (Oral)   Resp 12   Ht 1.753 m (5' 9\")   Wt 77.1 kg (170 lb)   LMP 07/28/2021   SpO2 99%   BMI 25.10 kg/m    Body mass index is 25.1 kg/m .  Physical Exam   GENERAL: healthy, alert and no distress  ABDOMEN: soft, nontender, no hepatosplenomegaly, no masses and bowel sounds normal  MS: no gross musculoskeletal defects noted, no edema  SKIN: no suspicious lesions or rashes  NEURO: Normal strength and tone, mentation intact and speech normal  PSYCH: mentation appears normal, affect normal/bright    Results for orders placed or performed in visit on 08/23/21   UA macro with reflex to Microscopic and Culture - Clinc Collect     Status: Abnormal    Specimen: Urine, Clean Catch   Result Value Ref Range    Color Urine Yellow Colorless, Straw, Light Yellow, Yellow    Appearance Urine Clear Clear    Glucose Urine Negative Negative mg/dL    Bilirubin Urine Negative Negative    Ketones Urine " Negative Negative mg/dL    Specific Gravity Urine 1.010 1.003 - 1.035    Blood Urine Trace (A) Negative    pH Urine 5.5 5.0 - 7.0    Protein Albumin Urine Negative Negative mg/dL    Urobilinogen Urine 0.2 0.2, 1.0 E.U./dL    Nitrite Urine Negative Negative    Leukocyte Esterase Urine Negative Negative   Urine Microscopic     Status: Abnormal   Result Value Ref Range    Bacteria Urine Few (A) None Seen /HPF    RBC Urine 0-2 0-2 /HPF /HPF    WBC Urine 0-5 0-5 /HPF /HPF    Squamous Epithelials Urine Few (A) None Seen /LPF    Narrative    Urine Culture not indicated

## 2021-09-21 ENCOUNTER — MYC MEDICAL ADVICE (OUTPATIENT)
Dept: FAMILY MEDICINE | Facility: CLINIC | Age: 31
End: 2021-09-21

## 2021-10-26 ENCOUNTER — VIRTUAL VISIT (OUTPATIENT)
Dept: FAMILY MEDICINE | Facility: CLINIC | Age: 31
End: 2021-10-26
Payer: COMMERCIAL

## 2021-10-26 DIAGNOSIS — F41.9 ANXIETY: ICD-10-CM

## 2021-10-26 PROCEDURE — 96127 BRIEF EMOTIONAL/BEHAV ASSMT: CPT | Mod: 95 | Performed by: FAMILY MEDICINE

## 2021-10-26 PROCEDURE — 99214 OFFICE O/P EST MOD 30 MIN: CPT | Mod: 95 | Performed by: FAMILY MEDICINE

## 2021-10-26 RX ORDER — ESCITALOPRAM OXALATE 10 MG/1
10 TABLET ORAL DAILY
Qty: 90 TABLET | Refills: 3 | Status: SHIPPED | OUTPATIENT
Start: 2021-10-26 | End: 2022-06-13

## 2021-10-26 ASSESSMENT — ANXIETY QUESTIONNAIRES
GAD7 TOTAL SCORE: 9
7. FEELING AFRAID AS IF SOMETHING AWFUL MIGHT HAPPEN: MORE THAN HALF THE DAYS
2. NOT BEING ABLE TO STOP OR CONTROL WORRYING: SEVERAL DAYS
3. WORRYING TOO MUCH ABOUT DIFFERENT THINGS: MORE THAN HALF THE DAYS
6. BECOMING EASILY ANNOYED OR IRRITABLE: SEVERAL DAYS
1. FEELING NERVOUS, ANXIOUS, OR ON EDGE: MORE THAN HALF THE DAYS
IF YOU CHECKED OFF ANY PROBLEMS ON THIS QUESTIONNAIRE, HOW DIFFICULT HAVE THESE PROBLEMS MADE IT FOR YOU TO DO YOUR WORK, TAKE CARE OF THINGS AT HOME, OR GET ALONG WITH OTHER PEOPLE: SOMEWHAT DIFFICULT
5. BEING SO RESTLESS THAT IT IS HARD TO SIT STILL: NOT AT ALL

## 2021-10-26 ASSESSMENT — PATIENT HEALTH QUESTIONNAIRE - PHQ9: 5. POOR APPETITE OR OVEREATING: SEVERAL DAYS

## 2021-10-26 NOTE — PROGRESS NOTES
Beata is a 31 year old who is being evaluated via a billable video visit.      How would you like to obtain your AVS? MyChart  If the video visit is dropped, the invitation should be resent by:  Text to cell phone: [phone]  Will anyone else be joining your video visit? 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    Anxiety  ANNEMARIE-7 SCORE 6/9/2021 7/7/2021 10/26/2021   Total Score 4 (minimal anxiety) - -   Total Score 4 8 9     We talked about benefit of counseling and excercise  We also talked about change in medications ?SNRI  For now she is willing to  start counseling to help with anxiety, instead of change in medications or dose.  Would like to continue with lexapro 10 mg once daily because  20mg caused brain zaps.  Follow up in 1-3 months  Do add over the counter vitamin d 1000 international unit once daily  And also over the counter omega 3, once daily  1000 mg , to see if that improves the mood as well      - MENTAL HEALTH REFERRAL  - Adult; Outpatient Treatment; Individual/Couples/Family/Group Therapy/Health Psychology; [facility] [phone]; We will contact you to schedule the appointment or please call with any questions; Future  - escitalopram (LEXAPRO) 10 MG tablet; Take 1 tablet (10 mg) by mouth daily. # 90, with 3 refills      Maternal nikhil benefited from  effexor  Dad has bipolar.      Return in about 4 weeks (around 11/23/2021) for concerns,unresolved, virtual/video visit.    Serina Munguia MD  [facility]   Beata is a 31 year old who presents for the following health issues     HPI     Medication Followup of escitalopram 20 mg    Taking Medication as prescribed: NO-pt has been taking 10 mg since Sept due to SEs    Side Effects:  Brain zaps    Medication Helping Symptoms:  Yes    20mg caused brain zaps, feels better on 10 mg qd     Feels that anxiety is more  significant on days off and when not excercise , and staying busy at work and excercise helps a lot    Would like to start counseling as well  Maternal nikhil benefited from  effexor  Dad has bipolar.  She reports no manic episode  Her  is very supportive       Depression and Anxiety Follow-Up    How are you doing with your depression since your last visit? Improved     How are you doing with your anxiety since your last visit?  Improved     Are you having other symptoms that might be associated with depression or anxiety? No    Have you had a significant life event? No     Do you have any concerns with your use of alcohol or other drugs? No    Social History     Tobacco Use     Smoking status: Never Smoker     Smokeless tobacco: Never Used   Vaping Use     Vaping Use: Never used   Substance Use Topics     Alcohol use: Yes     Alcohol/week: 5.0 standard drinks     Drug use: No     PHQ 7/15/2020 2/3/2021 6/9/2021   PHQ-9 Total Score 0 1 0   Q9: Thoughts of better off dead/self-harm past 2 weeks Not at all Not at all Not at all   F/U: Thoughts of suicide or self-harm - - -   F/U: Self harm-plan - - -   F/U: Self-harm action - - -   F/U: Safety concerns - - -     ANNEMARIE-7 SCORE 6/9/2021 7/7/2021 10/26/2021   Total Score 4 (minimal anxiety) - -   Total Score 4 8 9     Last PHQ-9 6/9/2021   1.  Little interest or pleasure in doing things 0   2.  Feeling down, depressed, or hopeless 0   3.  Trouble falling or staying asleep, or sleeping too much 0   4.  Feeling tired or having little energy 0   5.  Poor appetite or overeating 0   6.  Feeling bad about yourself 0   7.  Trouble concentrating 0   8.  Moving slowly or restless 0   Q9: Thoughts of better off dead/self-harm past 2 weeks 0   PHQ-9 Total Score 0   Difficulty at work, home, or with people -   In the past two weeks have you had thoughts of suicide or self harm? -   Do you have concerns about your personal safety or the safety of others? -   In the past 2 weeks  have you thought about a plan or had intention to harm yourself? -   In the past 2 weeks have you acted on these thoughts in any way? -     ANNEMARIE-7  10/26/2021   1. Feeling nervous, anxious, or on edge 2   2. Not being able to stop or control worrying 1   3. Worrying too much about different things 2   4. Trouble relaxing 1   5. Being so restless that it is hard to sit still 0   6. Becoming easily annoyed or irritable 1   7. Feeling afraid, as if something awful might happen 2   ANNEMARIE-7 Total Score 9   If you checked any problems, how difficult have they made it for you to do your work, take care of things at home, or get along with other people? Somewhat difficult       Suicide Assessment Five-step Evaluation and Treatment (SAFE-T)      How many servings of fruits and vegetables do you eat daily?  4 or more    On average, how many sweetened beverages do you drink each day (Examples: soda, juice, sweet tea, etc.  Do NOT count diet or artificially sweetened beverages)?   0    How many days per week do you exercise enough to make your heart beat faster? 3 or less    How many minutes a day do you exercise enough to make your heart beat faster? 20 - 29    How many days per week do you miss taking your medication? 0        Review of Systems   Constitutional, HEENT, cardiovascular, pulmonary, GI, , musculoskeletal, neuro, skin, endocrine and psych systems are negative, except as otherwise noted.      Objective           Vitals:  No vitals were obtained today due to virtual visit.    Physical Exam   GENERAL: Healthy, alert and no distress  EYES: Eyes grossly normal to inspection.  No discharge or erythema, or obvious scleral/conjunctival abnormalities.  RESP: No audible wheeze, cough, or visible cyanosis.  No visible retractions or increased work of breathing.    SKIN: Visible skin clear. No significant rash, abnormal pigmentation or lesions.  NEURO: Cranial nerves grossly intact.  Mentation and speech appropriate for  age.  PSYCH: Mentation appears normal, affect normal/bright, judgement and insight intact, normal speech and appearance well-groomed.                Video-Visit Details    Type of service:  Video Visit    Video End Time:5:44 PM    Originating Location (pt. Location): Home    Distant Location (provider location):  [facility]     Platform used for Video Visit: [email or web address]

## 2021-10-27 ASSESSMENT — ANXIETY QUESTIONNAIRES: GAD7 TOTAL SCORE: 9

## 2022-01-31 ENCOUNTER — LAB REQUISITION (OUTPATIENT)
Dept: LAB | Facility: CLINIC | Age: 32
End: 2022-01-31

## 2022-01-31 LAB — SARS-COV-2 RNA RESP QL NAA+PROBE: POSITIVE

## 2022-01-31 PROCEDURE — U0005 INFEC AGEN DETEC AMPLI PROBE: HCPCS | Performed by: INTERNAL MEDICINE

## 2022-03-03 ENCOUNTER — OFFICE VISIT (OUTPATIENT)
Dept: URGENT CARE | Facility: URGENT CARE | Age: 32
End: 2022-03-03
Payer: COMMERCIAL

## 2022-03-03 VITALS
WEIGHT: 175 LBS | HEART RATE: 79 BPM | RESPIRATION RATE: 15 BRPM | OXYGEN SATURATION: 98 % | TEMPERATURE: 98 F | SYSTOLIC BLOOD PRESSURE: 146 MMHG | HEIGHT: 69 IN | BODY MASS INDEX: 25.92 KG/M2 | DIASTOLIC BLOOD PRESSURE: 96 MMHG

## 2022-03-03 DIAGNOSIS — H60.391 INFECTIVE OTITIS EXTERNA, RIGHT: Primary | ICD-10-CM

## 2022-03-03 PROCEDURE — 99213 OFFICE O/P EST LOW 20 MIN: CPT | Performed by: PHYSICIAN ASSISTANT
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
## 2022-03-03 NOTE — PATIENT INSTRUCTIONS
Patient was educated on the natural course of condition. Take medication as prescribed. Side effects discussed. Continue antibiotic ear drops. Otitis externa occurs when the ear canal becomes inflamed. Several factors can increase your risk of developing this including swimming, wearing earbuds or hearing aids, and using q tips. Apply medication as prescribed. Conservative measures discussed including avoid using q-tips and over-the-counter analgesics (Tylenol or Ibuprofen). See your primary care provider if symptoms worsen or do not improve in 3 days. Seek emergency care if you develop severe ear pain, swelling, or redness.

## 2022-04-10 ENCOUNTER — APPOINTMENT (OUTPATIENT)
Dept: GENERAL RADIOLOGY | Facility: CLINIC | Age: 32
End: 2022-04-10
Attending: NURSE PRACTITIONER
Payer: COMMERCIAL

## 2022-04-10 ENCOUNTER — HOSPITAL ENCOUNTER (EMERGENCY)
Facility: CLINIC | Age: 32
Discharge: HOME | End: 2022-04-10
Attending: NURSE PRACTITIONER | Admitting: NURSE PRACTITIONER
Payer: COMMERCIAL

## 2022-04-10 VITALS
OXYGEN SATURATION: 99 % | RESPIRATION RATE: 14 BRPM | HEIGHT: 69 IN | BODY MASS INDEX: 25.18 KG/M2 | WEIGHT: 170 LBS | HEART RATE: 88 BPM | TEMPERATURE: 98.9 F | SYSTOLIC BLOOD PRESSURE: 152 MMHG | DIASTOLIC BLOOD PRESSURE: 100 MMHG

## 2022-04-10 DIAGNOSIS — S42.401A CLOSED FRACTURE OF RIGHT ELBOW, INITIAL ENCOUNTER: ICD-10-CM

## 2022-04-10 DIAGNOSIS — S09.90XA CLOSED HEAD INJURY, INITIAL ENCOUNTER: ICD-10-CM
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
## 2022-04-10 PROCEDURE — 73080 X-RAY EXAM OF ELBOW: CPT | Mod: RT

## 2022-04-10 PROCEDURE — 99284 EMERGENCY DEPT VISIT MOD MDM: CPT | Mod: 25

## 2022-04-10 PROCEDURE — 73110 X-RAY EXAM OF WRIST: CPT | Mod: RT

## 2022-04-10 PROCEDURE — 250N000011 HC RX IP 250 OP 636: Performed by: NURSE PRACTITIONER

## 2022-04-10 PROCEDURE — 250N000013 HC RX MED GY IP 250 OP 250 PS 637: Performed by: NURSE PRACTITIONER

## 2022-04-10 PROCEDURE — 24650 CLTX RDL HEAD/NCK FX WO MNPJ: CPT | Mod: RT

## 2022-04-10 RX ORDER — HYDROCODONE BITARTRATE AND ACETAMINOPHEN 5; 325 MG/1; MG/1
1-2 TABLET ORAL ONCE
Status: COMPLETED | OUTPATIENT
Start: 2022-04-10 | End: 2022-04-10

## 2022-04-10 RX ORDER — OXYCODONE AND ACETAMINOPHEN 5; 325 MG/1; MG/1
1 TABLET ORAL ONCE
Status: COMPLETED | OUTPATIENT
Start: 2022-04-10 | End: 2022-04-10

## 2022-04-10 RX ORDER — ONDANSETRON 4 MG/1
4 TABLET, ORALLY DISINTEGRATING ORAL ONCE
Status: COMPLETED | OUTPATIENT
Start: 2022-04-10 | End: 2022-04-10

## 2022-04-10 RX ORDER — HYDROCODONE BITARTRATE AND ACETAMINOPHEN 5; 325 MG/1; MG/1
1 TABLET ORAL EVERY 6 HOURS PRN
Qty: 6 TABLET | Refills: 0 | Status: SHIPPED | OUTPATIENT
Start: 2022-04-10 | End: 2022-04-13

## 2022-04-10 RX ADMIN — HYDROCODONE BITARTRATE AND ACETAMINOPHEN 1 TABLET: 5; 325 TABLET ORAL at 13:28

## 2022-04-10 RX ADMIN — ONDANSETRON 4 MG: 4 TABLET, ORALLY DISINTEGRATING ORAL at 13:29

## 2022-04-10 RX ADMIN — OXYCODONE HYDROCHLORIDE AND ACETAMINOPHEN 1 TABLET: 5; 325 TABLET ORAL at 14:06

## 2022-04-10 ASSESSMENT — ENCOUNTER SYMPTOMS
ABDOMINAL PAIN: 0
DIZZINESS: 0
HEADACHES: 0
WOUND: 0
FEVER: 0
TROUBLE SWALLOWING: 0
BACK PAIN: 0
CONFUSION: 0
SORE THROAT: 0
VOICE CHANGE: 0
NECK PAIN: 0
NAUSEA: 0
VOMITING: 0
SHORTNESS OF BREATH: 0
ARTHRALGIAS: 1
COUGH: 0
PHOTOPHOBIA: 0
FLANK PAIN: 0
MYALGIAS: 0
CHILLS: 0
NECK STIFFNESS: 0
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
## 2022-04-12 ENCOUNTER — TRANSFERRED RECORDS (OUTPATIENT)
Dept: HEALTH INFORMATION MANAGEMENT | Facility: CLINIC | Age: 32
End: 2022-04-12
Payer: COMMERCIAL

## 2022-04-25 ENCOUNTER — TRANSFERRED RECORDS (OUTPATIENT)
Dept: HEALTH INFORMATION MANAGEMENT | Facility: CLINIC | Age: 32
End: 2022-04-25
Payer: COMMERCIAL

## 2022-05-05 ENCOUNTER — TRANSFERRED RECORDS (OUTPATIENT)
Dept: HEALTH INFORMATION MANAGEMENT | Facility: CLINIC | Age: 32
End: 2022-05-05
Payer: COMMERCIAL

## 2022-05-23 ENCOUNTER — TRANSFERRED RECORDS (OUTPATIENT)
Dept: HEALTH INFORMATION MANAGEMENT | Facility: CLINIC | Age: 32
End: 2022-05-23
Payer: COMMERCIAL

## 2022-06-11 NOTE — PROGRESS NOTES
SUBJECTIVE:   CC: Beata Joseph is an 31 year old woman who presents for preventive health visit.       Patient has been advised of split billing requirements and indicates understanding: Yes  Healthy Habits:     Getting at least 3 servings of Calcium per day:  Yes    Bi-annual eye exam:  Yes    Dental care twice a year:  Yes    Sleep apnea or symptoms of sleep apnea:  None    Diet:  Regular (no restrictions)    Frequency of exercise:  2-3 days/week    Duration of exercise:  30-45 minutes    Taking medications regularly:  Yes    PHQ-2 Total Score: 2    Additional concerns today:  Yes  lexapro at 10 mg not helping,20 mg was too much. And even on 10 mg low libido   celexa was ineffective. Sister has been on Wellbutrin has been very effective   Would like to be off the medications- feeling more depressed.  Stopped oral contraceptive pills 2 months ago-caused no change in mood  Elbow fracture- same day as stopped oral contraceptive pills-  that was a set back and had been on FMLA for 6 weeks, now back at work.  Regarding mood and depression - has not started counseling    She reports her highs are very high for 1-2 days and lows can be low-, mostly stays low -  Reports no impulsive manic episodes or hypomanic .No paranoia.  Dad has bipolar and aunty as well.    Stopped oral contraceptive pills 2 months ago  Not trying to be pregnant just yet  Using natural family planning, pull out, use of consistent use of protection. And predicting ovulation to avoid sex at that time.     is very cooperative, good social support .    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    Today's PHQ-2 Score:   PHQ-2 ( 1999 Pfizer) 6/13/2022   Q1: Little interest or pleasure in doing things 0   Q2: Feeling down, depressed or hopeless 2   PHQ-2 Score 2   PHQ-2 Total Score (12-17 Years)- Positive if 3 or more points; Administer PHQ-A if positive -   Q1: Little interest or pleasure in doing things Not  at all   Q2: Feeling down, depressed or hopeless More than half the days   PHQ-2 Score 2       Abuse: Current or Past (Physical, Sexual or Emotional) - No  Do you feel safe in your environment? Yes        Social History     Tobacco Use     Smoking status: Never Smoker     Smokeless tobacco: Never Used   Substance Use Topics     Alcohol use: Yes     Alcohol/week: 5.0 standard drinks     If you drink alcohol do you typically have >3 drinks per day or >7 drinks per week? No    Alcohol Use 6/13/2022   Prescreen: >3 drinks/day or >7 drinks/week? No   Prescreen: >3 drinks/day or >7 drinks/week? -   No flowsheet data found.    Reviewed orders with patient.  Reviewed health maintenance and updated orders accordingly - Yes  BP Readings from Last 3 Encounters:   06/13/22 (!) 145/95   04/10/22 (!) 152/100   03/03/22 (!) 146/96    Wt Readings from Last 3 Encounters:   06/13/22 80.3 kg (177 lb)   04/10/22 77.1 kg (170 lb)   03/03/22 79.4 kg (175 lb)                    Breast Cancer Screening:    Breast CA Risk Assessment (FHS-7) 6/2/2021   Do you have a family history of breast, colon, or ovarian cancer? No / Unknown           Pertinent mammograms are reviewed under the imaging tab.    History of abnormal Pap smear: YES - updated in Problem List and Health Maintenance accordingly  PAP / HPV Latest Ref Rng & Units 6/9/2021 7/15/2020 5/22/2019   PAP (Historical) - ASC-US(A) NIL NIL   HPV16 NEG:Negative Negative Negative Negative   HPV18 NEG:Negative Negative Negative Negative   HRHPV NEG:Negative Positive(A) Positive(A) Positive(A)     Reviewed and updated as needed this visit by clinical staff   Tobacco  Allergies  Meds  Problems  Med Hx  Surg Hx  Fam Hx  Soc   Hx          Reviewed and updated as needed this visit by Provider   Tobacco  Allergies  Meds  Problems  Med Hx  Surg Hx  Fam Hx               Review of Systems   Constitutional: Negative for chills and fever.   HENT: Negative for congestion, ear pain, hearing  "loss and sore throat.    Eyes: Negative for pain and visual disturbance.   Respiratory: Negative for cough and shortness of breath.    Cardiovascular: Negative for chest pain, palpitations and peripheral edema.   Gastrointestinal: Positive for heartburn. Negative for abdominal pain, constipation, diarrhea, hematochezia and nausea.   Breasts:  Negative for tenderness, breast mass and discharge.   Genitourinary: Positive for vaginal discharge. Negative for dysuria, frequency, genital sores, hematuria, pelvic pain, urgency and vaginal bleeding.   Musculoskeletal: Negative for arthralgias, joint swelling and myalgias.   Skin: Negative for rash.   Neurological: Negative for dizziness, weakness, headaches and paresthesias.   Psychiatric/Behavioral: Negative for mood changes. The patient is nervous/anxious.           OBJECTIVE:   BP (!) 145/95   Pulse 110   Temp 97.5  F (36.4  C) (Temporal)   Resp 16   Ht 1.759 m (5' 9.25\")   Wt 80.3 kg (177 lb)   LMP 06/13/2022   SpO2 98%   Breastfeeding No   BMI 25.95 kg/m    Physical Exam  GENERAL: healthy, alert and no distress  EYES: Eyes grossly normal to inspection, PERRL and conjunctivae and sclerae normal  HENT: ear canals and TM's normal, nose and mouth without ulcers or lesions  NECK: no adenopathy, no asymmetry, masses, or scars and thyroid normal to palpation  RESP: lungs clear to auscultation - no rales, rhonchi or wheezes  BREAST: normal without masses, tenderness or nipple discharge and no palpable axillary masses or adenopathy  CV: regular rate and rhythm, normal S1 S2, no S3 or S4, no murmur, click or rub, no peripheral edema and peripheral pulses strong  ABDOMEN: soft, nontender, no hepatosplenomegaly, no masses and bowel sounds normal   (female): normal female external genitalia, normal urethral meatus, vaginal mucosa pink, moist, well rugated, and normal cervix/adnexa/uterus without masses or discharge  MS: no gross musculoskeletal defects noted, no " edema  SKIN: no suspicious lesions or rashes  NEURO: Normal strength and tone, mentation intact and speech normal  PSYCH: mentation appears normal, affect normal/bright  ANNEMARIE-7 SCORE 7/7/2021 10/26/2021 6/13/2022   Total Score - - -   Total Score 8 9 14     PHQ 2/3/2021 6/9/2021 6/13/2022   PHQ-9 Total Score 1 0 10   Q9: Thoughts of better off dead/self-harm past 2 weeks Not at all Not at all Not at all   F/U: Thoughts of suicide or self-harm - - -   F/U: Self harm-plan - - -   F/U: Self-harm action - - -   F/U: Safety concerns - - -     Diagnostic Test Results:  Labs reviewed in Epic  No results found for this or any previous visit (from the past 24 hour(s)).    ASSESSMENT/PLAN:   Beata was seen today for physical.    Diagnoses and all orders for this visit:    Routine general medical examination at a health care facility    Screening for HIV (human immunodeficiency virus)  -     HIV Antigen Antibody Combo; Future    Need for hepatitis C screening test  -     Hepatitis C Screen Reflex to HCV RNA Quant and Genotype    Cervical cancer screening  -     Pap Screen with HPV - recommended age 30 - 65 years    Anxiety  Comments:  lexapro-10 mg  not helping caused low libido.  Advised to taper it down to 5 mg once daily for next 1-2 weeks and then everyother day.  20mg caused brain zaps,  Sister better on wellbutrin  Orders:  -     escitalopram taper off -       start buPROPion (WELLBUTRIN XL) 150 MG 24 hr tablet; Take 1 tablet (150 mg) by mouth every morning  Follow up in 4 weeks  Start counsleing.  Start eval to R/O attention deficit hyperactivity disorder     -     Adult Mental Health  Referral; Future  -     Adult Mental Health  Referral; Future  Potential medication side effects were discussed with the patient; let me know if any occur.    Reactive depression  -     buPROPion (WELLBUTRIN XL) 150 MG 24 hr tablet; Take 1 tablet (150 mg) by mouth every morning  -     Adult Mental Health   "Referral; Future  -     Adult Mental Health  Referral; Future    Inattention  -   ? undxd attention deficit hyperactivity disorder- can also manifest as anxoety basia in women  Tests to rule that out.    Adult Mental Health  Referral; Future    Elevated Blood pressure without hypertension  She reports Blood pressure is normal at home  Please see patient instructions  Recheck advised at e and work and follow up in 4 weeks      Other orders  -     REVIEW OF HEALTH MAINTENANCE PROTOCOL ORDERS        Patient has been advised of split billing requirements and indicates understanding: Yes    COUNSELING:  Reviewed preventive health counseling, as reflected in patient instructions       Regular exercise       Healthy diet/nutrition    Estimated body mass index is 25.95 kg/m  as calculated from the following:    Height as of this encounter: 1.759 m (5' 9.25\").    Weight as of this encounter: 80.3 kg (177 lb).    Weight management plan: Discussed healthy diet and exercise guidelines    She reports that she has never smoked. She has never used smokeless tobacco.      Counseling Resources:  ATP IV Guidelines  Pooled Cohorts Equation Calculator  Breast Cancer Risk Calculator  BRCA-Related Cancer Risk Assessment: FHS-7 Tool  FRAX Risk Assessment  ICSI Preventive Guidelines  Dietary Guidelines for Americans, 2010  USDA's MyPlate  ASA Prophylaxis  Lung CA Screening    Serina Munguia MD  [facility]  "

## 2022-06-13 ENCOUNTER — OFFICE VISIT (OUTPATIENT)
Dept: FAMILY MEDICINE | Facility: CLINIC | Age: 32
End: 2022-06-13
Payer: COMMERCIAL

## 2022-06-13 VITALS
HEART RATE: 110 BPM | BODY MASS INDEX: 26.22 KG/M2 | SYSTOLIC BLOOD PRESSURE: 145 MMHG | HEIGHT: 69 IN | OXYGEN SATURATION: 98 % | DIASTOLIC BLOOD PRESSURE: 95 MMHG | RESPIRATION RATE: 16 BRPM | TEMPERATURE: 97.5 F | WEIGHT: 177 LBS

## 2022-06-13 DIAGNOSIS — F32.9 REACTIVE DEPRESSION: ICD-10-CM

## 2022-06-13 DIAGNOSIS — R03.0 ELEVATED BP WITHOUT DIAGNOSIS OF HYPERTENSION: ICD-10-CM

## 2022-06-13 DIAGNOSIS — R41.840 INATTENTION: ICD-10-CM

## 2022-06-13 DIAGNOSIS — Z11.4 SCREENING FOR HIV (HUMAN IMMUNODEFICIENCY VIRUS): ICD-10-CM

## 2022-06-13 DIAGNOSIS — F41.9 ANXIETY: ICD-10-CM

## 2022-06-13 DIAGNOSIS — Z00.00 ROUTINE GENERAL MEDICAL EXAMINATION AT A HEALTH CARE FACILITY: Primary | ICD-10-CM

## 2022-06-13 DIAGNOSIS — Z12.4 CERVICAL CANCER SCREENING: ICD-10-CM

## 2022-06-13 DIAGNOSIS — Z11.59 NEED FOR HEPATITIS C SCREENING TEST: ICD-10-CM

## 2022-06-13 PROCEDURE — 99214 OFFICE O/P EST MOD 30 MIN: CPT | Mod: 25 | Performed by: FAMILY MEDICINE

## 2022-06-13 PROCEDURE — 99395 PREV VISIT EST AGE 18-39: CPT | Performed by: FAMILY MEDICINE

## 2022-06-13 PROCEDURE — 87624 HPV HI-RISK TYP POOLED RSLT: CPT | Performed by: FAMILY MEDICINE

## 2022-06-13 PROCEDURE — 82306 VITAMIN D 25 HYDROXY: CPT | Performed by: FAMILY MEDICINE

## 2022-06-13 PROCEDURE — 80048 BASIC METABOLIC PNL TOTAL CA: CPT | Performed by: FAMILY MEDICINE

## 2022-06-13 PROCEDURE — G0145 SCR C/V CYTO,THINLAYER,RESCR: HCPCS | Performed by: FAMILY MEDICINE

## 2022-06-13 PROCEDURE — 36415 COLL VENOUS BLD VENIPUNCTURE: CPT | Performed by: FAMILY MEDICINE

## 2022-06-13 PROCEDURE — 86803 HEPATITIS C AB TEST: CPT | Performed by: FAMILY MEDICINE

## 2022-06-13 PROCEDURE — 87389 HIV-1 AG W/HIV-1&-2 AB AG IA: CPT | Performed by: FAMILY MEDICINE

## 2022-06-13 PROCEDURE — 84443 ASSAY THYROID STIM HORMONE: CPT | Performed by: FAMILY MEDICINE

## 2022-06-13 RX ORDER — ESCITALOPRAM OXALATE 10 MG/1
10 TABLET ORAL DAILY
Qty: 90 TABLET | Refills: 3 | Status: SHIPPED | OUTPATIENT
Start: 2022-06-13 | End: 2022-07-05

## 2022-06-13 RX ORDER — BUPROPION HYDROCHLORIDE 150 MG/1
150 TABLET ORAL EVERY MORNING
Qty: 30 TABLET | Refills: 3 | Status: SHIPPED | OUTPATIENT
Start: 2022-06-13 | End: 2022-07-05

## 2022-06-13 ASSESSMENT — ANXIETY QUESTIONNAIRES
2. NOT BEING ABLE TO STOP OR CONTROL WORRYING: MORE THAN HALF THE DAYS
7. FEELING AFRAID AS IF SOMETHING AWFUL MIGHT HAPPEN: MORE THAN HALF THE DAYS
5. BEING SO RESTLESS THAT IT IS HARD TO SIT STILL: SEVERAL DAYS
3. WORRYING TOO MUCH ABOUT DIFFERENT THINGS: MORE THAN HALF THE DAYS
GAD7 TOTAL SCORE: 14
6. BECOMING EASILY ANNOYED OR IRRITABLE: NEARLY EVERY DAY
GAD7 TOTAL SCORE: 14
1. FEELING NERVOUS, ANXIOUS, OR ON EDGE: NEARLY EVERY DAY
IF YOU CHECKED OFF ANY PROBLEMS ON THIS QUESTIONNAIRE, HOW DIFFICULT HAVE THESE PROBLEMS MADE IT FOR YOU TO DO YOUR WORK, TAKE CARE OF THINGS AT HOME, OR GET ALONG WITH OTHER PEOPLE: VERY DIFFICULT

## 2022-06-13 ASSESSMENT — ENCOUNTER SYMPTOMS
SORE THROAT: 0
NAUSEA: 0
HEARTBURN: 1
CONSTIPATION: 0
COUGH: 0
DIZZINESS: 0
HEADACHES: 0
NERVOUS/ANXIOUS: 1
HEMATURIA: 0
PARESTHESIAS: 0
ARTHRALGIAS: 0
JOINT SWELLING: 0
DYSURIA: 0
ABDOMINAL PAIN: 0
MYALGIAS: 0
FREQUENCY: 0
PALPITATIONS: 0
CHILLS: 0
WEAKNESS: 0
FEVER: 0
SHORTNESS OF BREATH: 0
EYE PAIN: 0
HEMATOCHEZIA: 0
DIARRHEA: 0
BREAST MASS: 0

## 2022-06-13 ASSESSMENT — PATIENT HEALTH QUESTIONNAIRE - PHQ9
SUM OF ALL RESPONSES TO PHQ QUESTIONS 1-9: 10
5. POOR APPETITE OR OVEREATING: SEVERAL DAYS

## 2022-06-13 NOTE — PATIENT INSTRUCTIONS
-Normal  BP is  119/79 or lower. Upper number is systolic Blood pressure (SBP). Lower number is diastolic  Blood pressure (DBP)  -Blood pressure between 120-139/80-89 (prehypertensive blood pressure/ class 1 hypertension )   -Hypertensive is  BP of 140/90 or above and needs treatment with medication.  -life style changes that are beneficial to reach goal of normal Blood pressure   1.Weight loss of 1 kg can reduce systolic Blood pressure  (SBP) by 1 mmHg  2.Health healthy diet (eg DASH dietary pattern can reduce SBP by 11 mmHg)  3.Structured excercise program (150 mins aerobic activity/week can reduce SBP by 5 mmHg)  4.Low salt  diet - very important.(eg: cut by 255 or 1000 mg/day to reduce SBP by 5mmHg)  5. Increase 4-5 serving of fresh vegetables & fruits /day- good source of potassium.    Other beneficial tips. Complete smoke cessation- if smoking  Reduction of alcohol     if More than 140/90  after a month of above life style changes  Return visit with MD/provider  for recheck & consideration of medications .       Preventive Health Recommendations  Female Ages 26 - 39  Yearly exam:   See your health care provider every year in order to  Review health changes.   Discuss preventive care.    Review your medicines if you your doctor has prescribed any.    Until age 30: Get a Pap test every three years (more often if you have had an abnormal result).    After age 30: Talk to your doctor about whether you should have a Pap test every 3 years or have a Pap test with HPV screening every 5 years.   You do not need a Pap test if your uterus was removed (hysterectomy) and you have not had cancer.  You should be tested each year for STDs (sexually transmitted diseases), if you're at risk.   Talk to your provider about how often to have your cholesterol checked.  If you are at risk for diabetes, you should have a diabetes test (fasting glucose).  Shots: Get a flu shot each year. Get a tetanus shot every 10 years.    Nutrition:   Eat at least 5 servings of fruits and vegetables each day.  Eat whole-grain bread, whole-wheat pasta and brown rice instead of white grains and rice.  Get adequate Calcium and Vitamin D.     Lifestyle  Exercise at least 150 minutes a week (30 minutes a day, 5 days of the week). This will help you control your weight and prevent disease.  Limit alcohol to one drink per day.  No smoking.   Wear sunscreen to prevent skin cancer.  See your dentist every six months for an exam and cleaning.

## 2022-06-13 NOTE — COMMUNITY RESOURCES LIST (ENGLISH)
06/13/2022   [facility]  Serina Munguia  For questions about this resource list or additional care needs, please contact your primary care clinic or care manager.  Phone: [phone]   Email: N/A   Address: [address] [place] [id number]   Hours: N/A        Hotlines and Helplines       Hotline - Crisis help  1  [employer] ([employer]) - Hotline - Crisis Help (673) 188-8482 Distance: 1.82 miles      COVID-19 Status: Phone/Virtual   [address] [place] [address]  Language: English  Hours: Mon - Sun Open 24 Hours   Phone: (549) 682-3967 Email: Unified Officeapb.mpls@HealthUnlocked.LOC&ALL Website: http://www.Movable.org/     2  [facility] Distance: 2.11 miles      COVID-19 Status: Phone/Virtual   [address] [place] [address]  Language: English  Hours: Mon - Sun Open 24 Hours   Phone: (758) 820-7828 Website: http://www.crisisnursery.org          Mental Health       Individual counseling  3  Every Third Saturday -  Approach Distance: 1.27 miles      COVID-19 Status: Regular Operations, COVID-19 Status: Phone/Virtual   [address] [place] [address]  Language: English  Hours: Mon - Tue 9:00 AM - 4:00 PM , Thu - Fri 9:00 AM - 4:00 PM  Fees: Free   Phone: (590) 204-6639 Email: eve@Juntines Website: https://DeepField.LOC&ALL/     4  [facility] Distance: 1.5 miles      COVID-19 Status: Phone/Virtual   [address] [place] [address]  Language: English, Mongolian  Hours: Mon - Sun Open 24 Hours  Fees: Insurance, Self Pay   Phone: (377) 496-5576 Website: http://www.[place].va.gov/     Mental health crisis care  5  [facility] Distance: 4.98 miles      COVID-19 Status: Regular Operations, COVID-19 Status: Phone/Virtual   [address] [address]  Language: English, Tunisian  Hours: Mon - Fri 9:00 AM - 5:00 PM  Fees: Insurance, Self Pay   Phone:  (712) 939-7328 Website: http://Armut/index.php     6  [facility] Distance: 5.01 miles      COVID-19 Status: Regular Operations   [address] [place] [address]  Language: English  Hours: Mon - Sun Open 24 Hours  Fees: Insurance, Self Pay, Sliding Fee   Phone: (308) 928-6601 Website: https://www.[facility].org/specialty/psychiatry/acute-psychiatry-services/     Mental health support group  7  [facility] Distance: 3.18 miles      COVID-19 Status: Service Unavailable   [address] [place] [address]  Language: English  Hours: Thu 7:00 PM - 9:00 PM  Fees: Free   Phone: (134) 474-7049 Email: office@[facility] Website: http://www.[facility] HealthMobixell Networks/     8  [facility] (Main Office) Distance: 3.2 miles      COVID-19 Status: Phone/Virtual   [address] [place] [address]  Language: English  Hours: Mon - Fri 9:00 AM - 5:00 PM  Fees: Free   Phone: (961) 607-2157 Email: info@[facility]villeZapproved.org Website: http://[facility].org          Important Numbers & Websites       Emergency Services   911  City Services   311  Poison Control   (828) 235-6344  Suicide Prevention Lifeline   (289) 299-7389 (TALK)  Child Abuse Hotline   (370) 203-5403 (4-A-Child)  Sexual Assault Hotline   (132) 320-1910 (HOPE)  National Runaway Safeline   (233) 889-6155 (RUNAWAY)  All-Options Talkline   (574) 702-3636  Substance Abuse Referral   (847) 723-7213 (HELP)

## 2022-06-14 LAB
ANION GAP SERPL CALCULATED.3IONS-SCNC: 7 MMOL/L (ref 3–14)
BUN SERPL-MCNC: 6 MG/DL (ref 7–30)
CALCIUM SERPL-MCNC: 9.3 MG/DL (ref 8.5–10.1)
CHLORIDE BLD-SCNC: 108 MMOL/L (ref 94–109)
CO2 SERPL-SCNC: 24 MMOL/L (ref 20–32)
CREAT SERPL-MCNC: 0.59 MG/DL (ref 0.52–1.04)
DEPRECATED CALCIDIOL+CALCIFEROL SERPL-MC: 28 UG/L (ref 20–75)
GFR SERPL CREATININE-BSD FRML MDRD: >90 ML/MIN/1.73M2
GLUCOSE BLD-MCNC: 99 MG/DL (ref 70–99)
HCV AB SERPL QL IA: NONREACTIVE
HIV 1+2 AB+HIV1 P24 AG SERPL QL IA: NONREACTIVE
POTASSIUM BLD-SCNC: 3.5 MMOL/L (ref 3.4–5.3)
SODIUM SERPL-SCNC: 139 MMOL/L (ref 133–144)
TSH SERPL DL<=0.005 MIU/L-ACNC: 1.47 MU/L (ref 0.4–4)

## 2022-06-15 LAB
BKR LAB AP GYN ADEQUACY: NORMAL
BKR LAB AP GYN INTERPRETATION: NORMAL
BKR LAB AP HPV REFLEX: NORMAL
BKR LAB AP PREVIOUS ABNL DX: NORMAL
BKR LAB AP PREVIOUS ABNORMAL: NORMAL
PATH REPORT.COMMENTS IMP SPEC: NORMAL
PATH REPORT.COMMENTS IMP SPEC: NORMAL
PATH REPORT.RELEVANT HX SPEC: NORMAL

## 2022-06-17 LAB
HUMAN PAPILLOMA VIRUS 16 DNA: NEGATIVE
HUMAN PAPILLOMA VIRUS 18 DNA: NEGATIVE
HUMAN PAPILLOMA VIRUS FINAL DIAGNOSIS: NORMAL
HUMAN PAPILLOMA VIRUS OTHER HR: NEGATIVE

## 2022-06-20 ENCOUNTER — OFFICE VISIT (OUTPATIENT)
Dept: URGENT CARE | Facility: URGENT CARE | Age: 32
End: 2022-06-20
Payer: COMMERCIAL

## 2022-06-20 VITALS
BODY MASS INDEX: 26.16 KG/M2 | HEART RATE: 81 BPM | SYSTOLIC BLOOD PRESSURE: 140 MMHG | DIASTOLIC BLOOD PRESSURE: 98 MMHG | RESPIRATION RATE: 16 BRPM | WEIGHT: 178.4 LBS | TEMPERATURE: 98.9 F | OXYGEN SATURATION: 98 %

## 2022-06-20 DIAGNOSIS — R03.0 ELEVATED BP WITHOUT DIAGNOSIS OF HYPERTENSION: ICD-10-CM

## 2022-06-20 DIAGNOSIS — N39.0 URINARY TRACT INFECTION WITH HEMATURIA, SITE UNSPECIFIED: ICD-10-CM

## 2022-06-20 DIAGNOSIS — R31.9 URINARY TRACT INFECTION WITH HEMATURIA, SITE UNSPECIFIED: ICD-10-CM

## 2022-06-20 DIAGNOSIS — R30.0 DYSURIA: Primary | ICD-10-CM

## 2022-06-20 LAB
ALBUMIN UR-MCNC: NEGATIVE MG/DL
APPEARANCE UR: CLEAR
BACTERIA #/AREA URNS HPF: ABNORMAL /HPF
BILIRUB UR QL STRIP: NEGATIVE
COLOR UR AUTO: YELLOW
GLUCOSE UR STRIP-MCNC: NEGATIVE MG/DL
HGB UR QL STRIP: ABNORMAL
KETONES UR STRIP-MCNC: NEGATIVE MG/DL
LEUKOCYTE ESTERASE UR QL STRIP: ABNORMAL
NITRATE UR QL: NEGATIVE
PH UR STRIP: 5.5 [PH] (ref 5–7)
RBC #/AREA URNS AUTO: ABNORMAL /HPF
SP GR UR STRIP: 1.01 (ref 1–1.03)
UROBILINOGEN UR STRIP-ACNC: 0.2 E.U./DL
WBC #/AREA URNS AUTO: ABNORMAL /HPF

## 2022-06-20 PROCEDURE — 99214 OFFICE O/P EST MOD 30 MIN: CPT | Performed by: PHYSICIAN ASSISTANT

## 2022-06-20 PROCEDURE — 81001 URINALYSIS AUTO W/SCOPE: CPT | Performed by: PHYSICIAN ASSISTANT

## 2022-06-20 PROCEDURE — 87086 URINE CULTURE/COLONY COUNT: CPT | Performed by: PHYSICIAN ASSISTANT

## 2022-06-20 RX ORDER — NITROFURANTOIN 25; 75 MG/1; MG/1
100 CAPSULE ORAL 2 TIMES DAILY
Qty: 14 CAPSULE | Refills: 0 | Status: SHIPPED | OUTPATIENT
Start: 2022-06-20 | End: 2022-07-05

## 2022-06-20 NOTE — PROGRESS NOTES
Assessment & Plan     Dysuria    UA positive  Urine culture pending    - UA Macro with Reflex to Micro and Culture - lab collect; Future  - UA Macro with Reflex to Micro and Culture - lab collect  - Urine Microscopic  - Urine Culture    Urinary tract infection with hematuria, site unspecified    Urine normally doesn't have any germs (bacteria) in it. But bacteria can get into the urinary tract from the skin around the rectum. Or they can travel in the blood from other parts of the body. Once they are in your urinary tract, they can cause infection in these areas:    The urethra (urethritis)    The bladder (cystitis)    The kidneys (pyelonephritis)  The most common place for an infection is in the bladder. This is called a bladder infection. This is one of the most common infections in women. Most bladder infections are easily treated. They are not serious unless the infection spreads to the kidney.  The terms bladder infection, UTI, and cystitis are often used to describe the same thing. But they are not always the same. Cystitis is an inflammation of the bladder. The most common cause of cystitis is an infection.  Symptoms  The infection causes inflammation in the urethra and bladder. This causes many of the symptoms. The most common symptoms of a bladder infection are:    Pain or burning when urinating    Having to urinate more often than normal    Urgent need to urinate    Only a small amount of urine comes out    Blood in urine    Belly (abdominal) discomfort. This is often in the lower belly above the pubic bone.    Cloudy urine    Strong- or bad-smelling urine    Unable to urinate (urinary retention)    Unable to hold urine in (urinary incontinence)    Fever    Loss of appetite    Confusion (in older adults)  Causes  Bladder infections are not contagious. You can't get one from someone else, from a toilet seat, or from sharing a bath.  The most common cause of bladder infections is bacteria from the bowels.  The bacteria get onto the skin around the opening of the urethra. From there, they can get into the urine. Then they travel up to the bladder, causing inflammation and infection. This often happens because of:    Wiping incorrectly after urinating. Always wipe from front to back.    Bowel incontinence    Pregnancy    Procedures such as having a catheter put in    Older age    Not emptying your bladder. This can give bacteria a chance to grow in your urine.    Fluid loss (dehydration)    Constipation    Having sex    Using a diaphragm for birth control   Treatment  Bladder infections are diagnosed by a urine test and urine culture. They are treated with antibiotics. They often clear up quickly without problems. Treatment helps prevent a more serious kidney infection.  - nitroFURantoin macrocrystal-monohydrate (MACROBID) 100 MG capsule; Take 1 capsule (100 mg) by mouth 2 times daily    Elevated BP without diagnosis of hypertension    Monitor blood pressure at home  If symptoms persist then follow up with PCP for recheck of BP        No follow-ups on file.    Antonio Obregon, [facility], PA-C  [facility]    Results for orders placed or performed in visit on 06/20/22   UA Macro with Reflex to Micro and Culture - lab collect     Status: Abnormal    Specimen: Urine, Clean Catch   Result Value Ref Range    Color Urine Yellow Colorless, Straw, Light Yellow, Yellow    Appearance Urine Clear Clear    Glucose Urine Negative Negative mg/dL    Bilirubin Urine Negative Negative    Ketones Urine Negative Negative mg/dL    Specific Gravity Urine 1.010 1.003 - 1.035    Blood Urine Small (A) Negative    pH Urine 5.5 5.0 - 7.0    Protein Albumin Urine Negative Negative mg/dL    Urobilinogen Urine 0.2 0.2, 1.0 E.U./dL    Nitrite Urine Negative Negative    Leukocyte Esterase Urine Small (A) Negative   Urine Microscopic     Status: Abnormal   Result Value Ref Range    Bacteria Urine Few (A) None Seen /HPF    RBC Urine  0-2 0-2 /HPF /HPF    WBC Urine 25-50 (A) 0-5 /HPF /HPF       Subjective   Beata is a 31 year old, presenting for the following health issues:  UTI      HPI       Beata Joseph, 31 year old, female presents to the urgent care today with:   UTI  Dysuria and frequency for a few days    Review of Systems   Constitutional, HEENT, cardiovascular, pulmonary, gi and gu systems are negative, except as otherwise noted.      Objective    BP (!) 140/98 (BP Location: Right arm, Patient Position: Chair, Cuff Size: Adult Regular)   Pulse 81   Temp 98.9  F (37.2  C) (Oral)   Resp 16   Wt 80.9 kg (178 lb 6.4 oz)   LMP 06/13/2022   SpO2 98%   Breastfeeding No   BMI 26.16 kg/m    Body mass index is 26.16 kg/m .  Physical Exam   GENERAL: healthy, alert and no distress  ABDOMEN: soft, nontender, no hepatosplenomegaly, no masses and bowel sounds normal  MS: no gross musculoskeletal defects noted, no edema  SKIN: no suspicious lesions or rashes  NEURO: Normal strength and tone, mentation intact and speech normal  PSYCH: mentation appears normal, affect normal/bright                    .  ..

## 2022-06-22 LAB — BACTERIA UR CULT: NORMAL

## 2022-06-30 NOTE — PROGRESS NOTES
Beata is a 31 year old who is being evaluated via a billable video visit.      How would you like to obtain your AVS? MyChart*  If the video visit is dropped, the invitation should be resent by: Text to cell phone: .  Will anyone else be joining your video visit? No          Assessment & Plan     Elevated BP without diagnosis of hypertension  Plan.  Continue to monitor blood pressure.    Anxiety  ANNEMARIE-7 SCORE 10/26/2021 6/13/2022 7/4/2022   Total Score - - 8 (mild anxiety)   Total Score 9 14 8       Plan.  Continue with Wellbutrin 150 mg once a day follow-up in 4 to 6 weeks, follow-up earlier should there be questions or concerns.      - EMOTIONAL / BEHAVIORAL ASSESSMENT  - buPROPion (WELLBUTRIN XL) 150 MG 24 hr tablet; Take 1 tablet (150 mg) by mouth every morning    Reactive depression  PHQ 6/9/2021 6/13/2022 7/4/2022   PHQ-9 Total Score 0 10 7   Q9: Thoughts of better off dead/self-harm past 2 weeks Not at all Not at all Not at all   F/U: Thoughts of suicide or self-harm - - -   F/U: Self harm-plan - - -   F/U: Self-harm action - - -   F/U: Safety concerns - - -     As above  - buPROPion (WELLBUTRIN XL) 150 MG 24 hr tablet; Take 1 tablet (150 mg) by mouth every morning  I think the lightheadedness is secondary to tapering off Lexapro and overall she is tolerated Wellbutrin very well.  0.[id number]}     Regular exercise    Return in about 22 days (around 7/27/2022) for concerns,unresolved, virtual/video visit.    Serina Munguia MD  [facility]    Subjective   Beata is a 31 year old, presenting for the following health issues:  No chief complaint on file.      HPI     Blood pressure  Follow-up      Do you check your blood pressure regularly outside of the clinic? Yes     Are you following a low salt diet? Yes    Are your blood pressures ever more than 140 on the top number (systolic) OR more   than 90 on the bottom number (diastolic), for example 140/90? No    Anxiety Follow-Up    How are you  doing with your anxiety since your last visit? Improved, has tapered off Lexapro completely and off Lexapro since past 1 week.    Has been tolerating Wellbutrin well without any concerns specially worsening of anxiety.  She does feel mood is better less irritable and has more energy on bupropion.    Are you having other symptoms that might be associated with anxiety? No    Have you had a significant life event? No     Are you feeling depressed? No    Do you have any concerns with your use of alcohol or other drugs? No    Social History     Tobacco Use     Smoking status: Never Smoker     Smokeless tobacco: Never Used   Vaping Use     Vaping Use: Never used   Substance Use Topics     Alcohol use: Yes     Alcohol/week: 5.0 standard drinks     Drug use: No     ANNEMARIE-7 SCORE 10/26/2021 6/13/2022 7/4/2022   Total Score - - 8 (mild anxiety)   Total Score 9 14 8     PHQ 6/9/2021 6/13/2022 7/4/2022   PHQ-9 Total Score 0 10 7   Q9: Thoughts of better off dead/self-harm past 2 weeks Not at all Not at all Not at all   F/U: Thoughts of suicide or self-harm - - -   F/U: Self harm-plan - - -   F/U: Self-harm action - - -   F/U: Safety concerns - - -                 Review of Systems   Constitutional, HEENT, cardiovascular, pulmonary, GI, , musculoskeletal, neuro, skin, endocrine and psych systems are negative, except as otherwise noted.      Objective    Vitals - Patient Reported  Systolic (Patient Reported): 135  Diastolic (Patient Reported): 81      Vitals:  No vitals were obtained today due to virtual visit.    Physical Exam   GENERAL: Healthy, alert and no distress  EYES: Eyes grossly normal to inspection.  No discharge or erythema, or obvious scleral/conjunctival abnormalities.  RESP: No audible wheeze, cough, or visible cyanosis.  No visible retractions or increased work of breathing.    SKIN: Visible skin clear. No significant rash, abnormal pigmentation or lesions.  NEURO: Cranial nerves grossly intact.  Mentation and  speech appropriate for age.  PSYCH: Mentation appears normal, affect normal/bright, judgement and insight intact, normal speech and appearance well-groomed.                Video-Visit Details    Video Start Time: 5:15 PM    Type of service:  Video Visit    Video End Time:5:26 PM    Originating Location (pt. Location): Home    Distant Location (provider location):  [facility]     Platform used for Video Visit: GlucoSentient    .  ..

## 2022-07-04 ASSESSMENT — ANXIETY QUESTIONNAIRES
7. FEELING AFRAID AS IF SOMETHING AWFUL MIGHT HAPPEN: SEVERAL DAYS
8. IF YOU CHECKED OFF ANY PROBLEMS, HOW DIFFICULT HAVE THESE MADE IT FOR YOU TO DO YOUR WORK, TAKE CARE OF THINGS AT HOME, OR GET ALONG WITH OTHER PEOPLE?: SOMEWHAT DIFFICULT
GAD7 TOTAL SCORE: 8
1. FEELING NERVOUS, ANXIOUS, OR ON EDGE: MORE THAN HALF THE DAYS
4. TROUBLE RELAXING: NOT AT ALL
7. FEELING AFRAID AS IF SOMETHING AWFUL MIGHT HAPPEN: SEVERAL DAYS
GAD7 TOTAL SCORE: 8
3. WORRYING TOO MUCH ABOUT DIFFERENT THINGS: MORE THAN HALF THE DAYS
5. BEING SO RESTLESS THAT IT IS HARD TO SIT STILL: NOT AT ALL
GAD7 TOTAL SCORE: 8
6. BECOMING EASILY ANNOYED OR IRRITABLE: MORE THAN HALF THE DAYS
2. NOT BEING ABLE TO STOP OR CONTROL WORRYING: SEVERAL DAYS

## 2022-07-04 ASSESSMENT — PATIENT HEALTH QUESTIONNAIRE - PHQ9
SUM OF ALL RESPONSES TO PHQ QUESTIONS 1-9: 7
10. IF YOU CHECKED OFF ANY PROBLEMS, HOW DIFFICULT HAVE THESE PROBLEMS MADE IT FOR YOU TO DO YOUR WORK, TAKE CARE OF THINGS AT HOME, OR GET ALONG WITH OTHER PEOPLE: SOMEWHAT DIFFICULT
SUM OF ALL RESPONSES TO PHQ QUESTIONS 1-9: 7

## 2022-07-05 ENCOUNTER — VIRTUAL VISIT (OUTPATIENT)
Dept: FAMILY MEDICINE | Facility: CLINIC | Age: 32
End: 2022-07-05
Payer: COMMERCIAL

## 2022-07-05 DIAGNOSIS — F32.9 REACTIVE DEPRESSION: ICD-10-CM

## 2022-07-05 DIAGNOSIS — F41.9 ANXIETY: ICD-10-CM

## 2022-07-05 DIAGNOSIS — R03.0 ELEVATED BP WITHOUT DIAGNOSIS OF HYPERTENSION: Primary | ICD-10-CM

## 2022-07-05 PROCEDURE — 96127 BRIEF EMOTIONAL/BEHAV ASSMT: CPT | Mod: 95 | Performed by: FAMILY MEDICINE

## 2022-07-05 PROCEDURE — 99213 OFFICE O/P EST LOW 20 MIN: CPT | Mod: 95 | Performed by: FAMILY MEDICINE

## 2022-07-05 RX ORDER — BUPROPION HYDROCHLORIDE 150 MG/1
150 TABLET ORAL EVERY MORNING
Qty: 90 TABLET | Refills: 1 | Status: SHIPPED | OUTPATIENT
Start: 2022-07-05 | End: 2022-07-27

## 2022-07-27 ENCOUNTER — VIRTUAL VISIT (OUTPATIENT)
Dept: FAMILY MEDICINE | Facility: CLINIC | Age: 32
End: 2022-07-27
Payer: COMMERCIAL

## 2022-07-27 DIAGNOSIS — F41.9 ANXIETY: ICD-10-CM

## 2022-07-27 DIAGNOSIS — F33.0 MILD EPISODE OF RECURRENT MAJOR DEPRESSIVE DISORDER (H): ICD-10-CM

## 2022-07-27 PROBLEM — F32.9 REACTIVE DEPRESSION: Status: RESOLVED | Noted: 2022-07-05 | Resolved: 2022-07-27

## 2022-07-27 PROCEDURE — 99214 OFFICE O/P EST MOD 30 MIN: CPT | Mod: 95 | Performed by: FAMILY MEDICINE

## 2022-07-27 PROCEDURE — 96127 BRIEF EMOTIONAL/BEHAV ASSMT: CPT | Mod: 95 | Performed by: FAMILY MEDICINE

## 2022-07-27 RX ORDER — BUPROPION HYDROCHLORIDE 150 MG/1
150 TABLET ORAL EVERY MORNING
Qty: 30 TABLET | Refills: 11 | Status: SHIPPED | OUTPATIENT
Start: 2022-07-27 | End: 2022-11-22

## 2022-07-27 ASSESSMENT — PATIENT HEALTH QUESTIONNAIRE - PHQ9: SUM OF ALL RESPONSES TO PHQ QUESTIONS 1-9: 3

## 2022-07-27 NOTE — PROGRESS NOTES
Beata is a 31 year old who is being evaluated via a billable video visit.      How would you like to obtain your AVS? MyChart  If the video visit is dropped, the invitation should be resent by: Text to cell phone: [phone]  Will anyone else be joining your video visit? No           Assessment & Plan     Mild episode of recurrent major depressive disorder (H)  Plan:wellbutrin  mg qd  Pregnancy implication- discussed in detail.  Benefit of continuing medication outweighs the risk.    GEORGES 3/20/23  - buPROPion (WELLBUTRIN XL) 150 MG 24 hr tablet; Take 1 tablet (150 mg) by mouth every morning  - EMOTIONAL / BEHAVIORAL ASSESSMENT  PHQ 6/13/2022 7/4/2022 7/27/2022   PHQ-9 Total Score 10 7 3   Q9: Thoughts of better off dead/self-harm past 2 weeks Not at all Not at all Not at all   F/U: Thoughts of suicide or self-harm - - -   F/U: Self harm-plan - - -   F/U: Self-harm action - - -   F/U: Safety concerns - - -     Anxiety  As above   - buPROPion (WELLBUTRIN XL) 150 MG 24 hr tablet; Take 1 tablet (150 mg) by mouth every morning  - EMOTIONAL / BEHAVIORAL ASSESSMENT  ANNEMARIE-7 SCORE 10/26/2021 6/13/2022 7/4/2022   Total Score - - 8 (mild anxiety)   Total Score 9 14 8     Potential medication side effects, including pregnancy implications were discussed with the patient.    She has her first needed prenatal appointment coming up in 8/19/21.  Based on her LMP, her GEORGES is March 20, 2023.      She has been taking prenatal vitamin and reports no acute concerns.    No follow-ups on file.   Follow-up Visit   Expected date:  Oct 27, 2022 (Approximate)      Follow Up Appointment Details:     Follow-up with whom?: Me    Follow-Up for what?: Chronic Disease f/u    Chronic Disease f/u:  Depression  Anxiety       How?: Video    Is this an as-needed follow-up?: No                    Serina Munguia MD  [facility]    Subjective   Beata is a 31 year old accompanied by her self, presenting for the following health  issues:  Recheck Medication (For depression.)      HPI   Last menstural period 06/13/2022  6 wk of GA  And taking prenatal vitamins  Expected date of delivery  3/23/22      Depression well controlled on wellbutrin 150 mg   Off lexapro > 4 weeks   Sister also tried to get of Wellbutrin while pregnant and was not able to manage the mood.  She had to be put back on Wellbutrin.  Cyndi has reported significant improvement of her symptoms and understand that she would like to be better off taking care of her mental health with the medication during pregnancy then having worsening of depression anxiety during pregnancy without medication.    She has her first needed prenatal appointment coming up in 8/19/21  She has been taking prenatal vitamin and reports no acute concerns.        Review of Systems   Constitutional, HEENT, cardiovascular, pulmonary, GI, , musculoskeletal, neuro, skin, endocrine and psych systems are negative, except as otherwise noted.      Objective    Vitals - Patient Reported  Systolic (Patient Reported): 132  Diastolic (Patient Reported): 78      Vitals:  No vitals were obtained today due to virtual visit.    Physical Exam   GENERAL: Healthy, alert and no distress  EYES: Eyes grossly normal to inspection.  No discharge or erythema, or obvious scleral/conjunctival abnormalities.  RESP: No audible wheeze, cough, or visible cyanosis.  No visible retractions or increased work of breathing.    SKIN: Visible skin clear. No significant rash, abnormal pigmentation or lesions.  NEURO: Cranial nerves grossly intact.  Mentation and speech appropriate for age.  PSYCH: Mentation appears normal, affect normal/bright, judgement and insight intact, normal speech and appearance well-groomed.                Video-Visit Details    Video Start Time: 5:24 PM    Type of service:  Video Visit    Video End Time:5:34 PM    Originating Location (pt. Location): Home    Distant Location (provider location):  [facility]     Platform used for Video Visit: Juan Manuel Puri.

## 2022-08-19 ENCOUNTER — PRENATAL OFFICE VISIT (OUTPATIENT)
Dept: NURSING | Facility: CLINIC | Age: 32
End: 2022-08-19
Payer: COMMERCIAL

## 2022-08-19 VITALS — HEIGHT: 69 IN | BODY MASS INDEX: 26.22 KG/M2 | WEIGHT: 177 LBS

## 2022-08-19 DIAGNOSIS — O09.91 SUPERVISION OF HIGH RISK PREGNANCY IN FIRST TRIMESTER: ICD-10-CM

## 2022-08-19 DIAGNOSIS — Z13.79 GENETIC SCREENING: ICD-10-CM

## 2022-08-19 DIAGNOSIS — R11.0 NAUSEA: ICD-10-CM

## 2022-08-19 DIAGNOSIS — O36.80X0 PREGNANCY WITH INCONCLUSIVE FETAL VIABILITY: Primary | ICD-10-CM

## 2022-08-19 PROBLEM — O09.90 SUPERVISION OF HIGH-RISK PREGNANCY: Status: ACTIVE | Noted: 2022-08-19

## 2022-08-19 PROCEDURE — 99207 PR NO CHARGE NURSE ONLY: CPT

## 2022-08-19 RX ORDER — PYRIDOXINE HCL (VITAMIN B6) 25 MG
25 TABLET ORAL EVERY 8 HOURS PRN
Qty: 90 TABLET | Refills: 0 | Status: SHIPPED | OUTPATIENT
Start: 2022-08-19 | End: 2023-01-13

## 2022-08-19 NOTE — PROGRESS NOTES
SUBJECTIVE:     HPI:    This is a 31 year old female patient,  who presents for her first obstetrical visit.    GEORGES: 3/20/2023, by Last Menstrual Period.  She is 9w4d weeks.  Her cycles are regular.  Her last menstrual period was normal.   Since her LMP, she has experienced  nausea, fatigue and emotional.    Additional History: depression    Have you travelled during the pregnancy?No  Have your sexual partner(s) travelled during the pregnancy?No    HISTORY:   Planned Pregnancy: Yes  Marital Status:   Occupation: OT at [employer]  Living in Household: Spouse-Lewis    Past History:  Her past medical history   Past Medical History:   Diagnosis Date     Abnormal cervical Papanicolaou smear 2018, 22     Cervical high risk HPV (human papillomavirus) test positive 2018, 2019, 07/15/20, 21     Depressive disorder 2020   .      She has a history of  first pregnancy    Since her last LMP she denies use of alcohol, tobacco and street drugs.    Past medical, surgical, social and family history were reviewed and updated in EPIC.      Current Outpatient Medications   Medication     buPROPion (WELLBUTRIN XL) 150 MG 24 hr tablet     doxylamine (UNISOM) 25 MG TABS tablet     omeprazole (PRILOSEC) 20 MG DR capsule     Prenat w/o K-AO-Hsjlrtm-FA-DHA (PNV-DHA PO)     No current facility-administered medications for this visit.       ROS:   12 point review of systems negative other than symptoms noted below or in the HPI.  Constitutional: Fatigue  Gastrointestinal: Nausea  Psychiatric: Depression and Saravia    Nurse phone visit completed. Prenatal book and folder containing standard educational hand-outs and brochures will be given at the next visit to patient. Information in folder reviewed over the phone and sent via zeeWAVES Questions answered. Information given on optional screening available to assess chromosomal anomalies. Pt desires NIPS. Pt advised to call the  "clinic if she has any questions or concerns related to her pregnancy. Prenatal labs future ordered.   Mary Kay Shaikh RN on 8/19/2022 at 9:07 AM        Lab Results   Component Value Date    PAP ASC-US 06/09/2021     PHQ-9 score:    PHQ 8/18/2022   PHQ-9 Total Score 8   Q9: Thoughts of better off dead/self-harm past 2 weeks Not at all   F/U: Thoughts of suicide or self-harm -   F/U: Self harm-plan -   F/U: Self-harm action -   F/U: Safety concerns -               ANNEMARIE-7 SCORE 6/13/2022 7/4/2022 8/18/2022   Total Score - 8 (mild anxiety) 6 (mild anxiety)   Total Score 14 8 6             Patient supplied answers from flow sheet for:  Prenatal OB Questionnaire.  Past Medical History  Have you ever recieved care for your mental health? : (!) Yes  Have you ever been in a major accident or suffered serious trauma?: No  Within the last year, has anyone hit, slapped, kicked or otherwise hurt you?: No  In the last year, has anyone forced you to have sex when you didn't want to?: No    Past Medical History 2   Have you ever received a blood transfusion?: No  Would you accept a blood transfusion if was medically recommended?: Yes  Does anyone in your home smoke?: No   Is your blood type Rh negative?: No  Have you ever ?: No  Have you been hospitalized for a nonsurgical reason excluding normal delivery?: No  Have you ever had an abnormal pap smear?: (!) Yes    Past Medical History (Continued)  Do you have a history of abnormalities of the uterus?: No  Did your mother take ANTONIO or any other hormones when she was pregnant with you?: No  Do you have any other problems we have not asked about which you feel may be important to this pregnancy?: No                   OBJECTIVE:     EXAM:  Ht 1.753 m (5' 9\")   Wt 80.3 kg (177 lb)   LMP 06/13/2022   BMI 26.14 kg/m   Body mass index is 26.14 kg/m .      "

## 2022-08-19 NOTE — ASSESSMENT & PLAN NOTE
**HR  FOB:Lewis     GEORGES: Mar 20, 2023  BT Placenta:  Sex:   Genetic:  Yes    Tdap:          Flu:           GBS:     Problems  F/U next visit

## 2022-08-19 NOTE — PROGRESS NOTES
Answers for HPI/ROS submitted by the patient on 2022  If you checked off any problems, how difficult have these problems made it for you to do your work, take care of things at home, or get along with other people?: Very difficult  PHQ9 TOTAL SCORE: 9    SUBJECTIVE:      HPI:   This is a 31 year old female patient,  who presents for her first obstetrical visit. Here with Lewis, doing okay, lots of fatigue, nausea, loss of appetite, and increasing mood issues. She feels like things will hopefully improve once she is out of first trimester. Started wellbutrin a few months ago and felt it was working well prior to pregnancy, managed by FP, they would like genetic testing and to know sex of baby. Active job at [employer].     GEORGES: 3/20/2023, by Last Menstrual Period.  She is 9w4d weeks.  Her cycles are regular.  Her last menstrual period was normal.   Since her LMP, she has experienced  nausea, fatigue and emotional.     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       She has a history of  first pregnancy     Since her last LMP she denies use of alcohol, tobacco and street drugs.     Past medical, surgical, social and family history were reviewed and updated in EPIC.    PHQ-9 score:    PHQ 2022   PHQ-9 Total Score 9   Q9: Thoughts of better off dead/self-harm past 2 weeks Not at all   F/U: Thoughts of suicide or self-harm -   F/U: Self harm-plan  "-   F/U: Self-harm action -   F/U: Safety concerns -     ANNEMARIE-7 SCORE 2022   Total Score - 8 (mild anxiety) 6 (mild anxiety)   Total Score 14 8 6       OB HISTORY  OB History    Para Term  AB Living   1 0 0 0 0 0   SAB IAB Ectopic Multiple Live Births   0 0 0 0 0      # Outcome Date GA Lbr Dave/2nd Weight Sex Delivery Anes PTL Lv   1 Current              PERSONAL HISTORY  Exercise Habits: active prior to pregnancy  Employment: Full time.  Her job involves light activity with moderate potential for toxic exposure.    Abuse concerns? Unable to assess  Hgb A1c screen:  BMI > 30: No, 1st degree family DM: No, History of GDM: No, PCOS: No, High risk ethnicity: No              Current Outpatient Medications   Medication     buPROPion (WELLBUTRIN XL) 150 MG 24 hr tablet     doxylamine (UNISOM) 25 MG TABS tablet     omeprazole (PRILOSEC) 20 MG DR capsule     Prenat w/o N-LN-Rtgzyox-FA-DHA (PNV-DHA PO)      No current facility-administered medications for this visit.         ROS:   12 point review of systems negative other than symptoms noted below or in the HPI.  Constitutional: Fatigue  Gastrointestinal: Nausea  Psychiatric: Depression and Saravia     Nurse phone visit completed. Prenatal book and folder containing standard educational hand-outs and brochures will be given at the next visit to patient. Information in folder reviewed over the phone and sent via [employer] Questions answered. Information given on optional screening available to assess chromosomal anomalies. Pt desires NIPS. Pt advised to call the clinic if she has any questions or concerns related to her pregnancy. Prenatal labs future ordered.   Mary Kay Shaikh RN on 2022 at 9:07 AM        OBJECTIVE:     EXAM:  /80   Pulse 64   Ht 1.753 m (5' 9\")   Wt 80.3 kg (177 lb)   LMP 2022   BMI 26.14 kg/m   Body mass index is 26.14 kg/m .    GENERAL: healthy, alert and no distress  NECK: no adenopathy, no asymmetry, " masses, or scars and thyroid normal to palpation  RESP: lungs clear to auscultation - no rales, rhonchi or wheezes  CV: regular rate and rhythm, normal S1 S2, no S3 or S4, no murmur, click or rub, no peripheral edema and peripheral pulses strong  ABDOMEN: soft, nontender, no hepatosplenomegaly, no masses and bowel sounds normal  MS: no gross musculoskeletal defects noted, no edema  SKIN: no suspicious lesions or rashes  NEURO: Normal strength and tone, mentation intact and speech normal  PSYCH: mentation appears normal, affect normal/bright    ASSESSMENT/PLAN:       ICD-10-CM    1. Supervision of high risk pregnancy in first trimester  O09.91 *UA reflex to Microscopic     ABO/Rh type and screen     Hepatitis B surface antigen     CBC with platelets     HIV Antigen Antibody Combo     Rubella Antibody IgG Quantitative     Treponema Abs w Reflex to RPR and Titer     Hepatitis C antibody   2. Mild episode of recurrent major depressive disorder (H)  F33.0    3. Anxiety  F41.9    4. 10 weeks gestation of pregnancy  Z3A.10    5. Genetic screening  Z13.79 [facility] Non-Invasive Prenatal Screening       31 year old , On 2022 patient is 10w3d weeks of pregnancy with GEORGES of 3/20/2023, by Last Menstrual Period     consult for US for AMA patients: NA  Genetic Testing reviewed and discussed, patient desires [facility]. Handout provided, consent reviewed and signed. Sex chromosomes to be disclosed.   Not a candidate for baby ASA    COUNSELING    Instructed on use of triage nurse line and contacting the on call CNM after hours in an emergency.     Symptoms of N&V and fatigue usually start to resolve around 12-16 weeks     Reviewed CNM philosophy, call schedule for labor and delivery, and FSH for delivery    1st OB handout given outlining appointment spacing and CNM information    Reviewed exercise and nutrition    Recommend to gain 25-35 pounds with her pregnancy.    Discussed OTC medications. OB med list  given    Encouraged patient to take PNV's/DHA    Will notify patient with lab results when available        F/U to be addressed next visit:  Mood/WC syndrome-plan to check BP end of visits, discuss travel    Will return to the clinic in 4 weeks for her next routine prenatal check.  Will call to be seen sooner if problems arise.      ALEJO Allen CNM

## 2022-08-23 ASSESSMENT — PATIENT HEALTH QUESTIONNAIRE - PHQ9
SUM OF ALL RESPONSES TO PHQ QUESTIONS 1-9: 9
10. IF YOU CHECKED OFF ANY PROBLEMS, HOW DIFFICULT HAVE THESE PROBLEMS MADE IT FOR YOU TO DO YOUR WORK, TAKE CARE OF THINGS AT HOME, OR GET ALONG WITH OTHER PEOPLE: VERY DIFFICULT
SUM OF ALL RESPONSES TO PHQ QUESTIONS 1-9: 9

## 2022-08-24 LAB
ABO/RH(D): NORMAL
ANTIBODY SCREEN: NEGATIVE
SPECIMEN EXPIRATION DATE: NORMAL

## 2022-08-25 ENCOUNTER — ANCILLARY PROCEDURE (OUTPATIENT)
Dept: ULTRASOUND IMAGING | Facility: CLINIC | Age: 32
End: 2022-08-25
Payer: COMMERCIAL

## 2022-08-25 ENCOUNTER — PRENATAL OFFICE VISIT (OUTPATIENT)
Dept: MIDWIFE SERVICES | Facility: CLINIC | Age: 32
End: 2022-08-25
Payer: COMMERCIAL

## 2022-08-25 VITALS
DIASTOLIC BLOOD PRESSURE: 80 MMHG | SYSTOLIC BLOOD PRESSURE: 128 MMHG | WEIGHT: 177 LBS | BODY MASS INDEX: 26.22 KG/M2 | HEIGHT: 69 IN | HEART RATE: 64 BPM

## 2022-08-25 DIAGNOSIS — F41.9 ANXIETY: ICD-10-CM

## 2022-08-25 DIAGNOSIS — O09.91 SUPERVISION OF HIGH RISK PREGNANCY IN FIRST TRIMESTER: Primary | ICD-10-CM

## 2022-08-25 DIAGNOSIS — O36.80X0 PREGNANCY WITH INCONCLUSIVE FETAL VIABILITY: ICD-10-CM

## 2022-08-25 DIAGNOSIS — Z13.79 GENETIC SCREENING: ICD-10-CM

## 2022-08-25 DIAGNOSIS — Z3A.10 10 WEEKS GESTATION OF PREGNANCY: ICD-10-CM

## 2022-08-25 DIAGNOSIS — F33.0 MILD EPISODE OF RECURRENT MAJOR DEPRESSIVE DISORDER (H): ICD-10-CM

## 2022-08-25 PROBLEM — Z30.09 FAMILY PLANNING: Status: RESOLVED | Noted: 2017-05-15 | Resolved: 2022-08-25

## 2022-08-25 LAB
ALBUMIN UR-MCNC: NEGATIVE MG/DL
APPEARANCE UR: CLEAR
BILIRUB UR QL STRIP: NEGATIVE
COLOR UR AUTO: YELLOW
ERYTHROCYTE [DISTWIDTH] IN BLOOD BY AUTOMATED COUNT: 12.7 % (ref 10–15)
GLUCOSE UR STRIP-MCNC: NEGATIVE MG/DL
HCT VFR BLD AUTO: 40.9 % (ref 35–47)
HGB BLD-MCNC: 13.8 G/DL (ref 11.7–15.7)
HGB UR QL STRIP: NEGATIVE
KETONES UR STRIP-MCNC: NEGATIVE MG/DL
LEUKOCYTE ESTERASE UR QL STRIP: NEGATIVE
MCH RBC QN AUTO: 29.3 PG (ref 26.5–33)
MCHC RBC AUTO-ENTMCNC: 33.7 G/DL (ref 31.5–36.5)
MCV RBC AUTO: 87 FL (ref 78–100)
NITRATE UR QL: NEGATIVE
PH UR STRIP: 7 [PH] (ref 5–7)
PLATELET # BLD AUTO: 236 10E3/UL (ref 150–450)
RBC # BLD AUTO: 4.71 10E6/UL (ref 3.8–5.2)
SP GR UR STRIP: 1.02 (ref 1–1.03)
UROBILINOGEN UR STRIP-ACNC: 0.2 E.U./DL
WBC # BLD AUTO: 8.5 10E3/UL (ref 4–11)

## 2022-08-25 PROCEDURE — 87389 HIV-1 AG W/HIV-1&-2 AB AG IA: CPT | Performed by: ADVANCED PRACTICE MIDWIFE

## 2022-08-25 PROCEDURE — 87340 HEPATITIS B SURFACE AG IA: CPT | Performed by: ADVANCED PRACTICE MIDWIFE

## 2022-08-25 PROCEDURE — 85027 COMPLETE CBC AUTOMATED: CPT | Performed by: ADVANCED PRACTICE MIDWIFE

## 2022-08-25 PROCEDURE — 86850 RBC ANTIBODY SCREEN: CPT | Performed by: ADVANCED PRACTICE MIDWIFE

## 2022-08-25 PROCEDURE — 76801 OB US < 14 WKS SINGLE FETUS: CPT | Performed by: OBSTETRICS & GYNECOLOGY

## 2022-08-25 PROCEDURE — 86900 BLOOD TYPING SEROLOGIC ABO: CPT | Performed by: ADVANCED PRACTICE MIDWIFE

## 2022-08-25 PROCEDURE — 86901 BLOOD TYPING SEROLOGIC RH(D): CPT | Performed by: ADVANCED PRACTICE MIDWIFE

## 2022-08-25 PROCEDURE — 36415 COLL VENOUS BLD VENIPUNCTURE: CPT | Performed by: ADVANCED PRACTICE MIDWIFE

## 2022-08-25 PROCEDURE — 86762 RUBELLA ANTIBODY: CPT | Performed by: ADVANCED PRACTICE MIDWIFE

## 2022-08-25 PROCEDURE — 99207 PR FIRST OB VISIT: CPT | Performed by: ADVANCED PRACTICE MIDWIFE

## 2022-08-25 PROCEDURE — 86803 HEPATITIS C AB TEST: CPT | Performed by: ADVANCED PRACTICE MIDWIFE

## 2022-08-25 PROCEDURE — 86780 TREPONEMA PALLIDUM: CPT | Performed by: ADVANCED PRACTICE MIDWIFE

## 2022-08-25 PROCEDURE — 81003 URINALYSIS AUTO W/O SCOPE: CPT | Performed by: ADVANCED PRACTICE MIDWIFE

## 2022-08-25 NOTE — PATIENT INSTRUCTIONS
Thank you for coming to see the Midwives at the   [facility]!    Please take prenatal vitamin with DHA and vitamin D3 2,000-3,000 international unit(s) once daily during the entire pregnancy.    We will notify you about your labs that were drawn today once we get the results back.  If you have MyChart your lab results will be posted there.    Someone from the clinic will send you a [employer] message or call you personally with your results.    If you need any refills of medications please call your pharmacy and they will contact us.    If you have a medical emergency please call 911.    If you have any concerns about today's visit, wish to schedule another appointment, or have an urgent medical concern please call our office at [phone]. You can also make appointments through [employer].    After hours you may also call the clinic number above to be connected with [place]'s after hours triage nurse.  The nurse can page the midwife on call if needed. There is always a midwife on call 24 hours a day.    Prenatal Care Recommendations:    Before 14 weeks: Dating ultrasound, genetic testing       This ultrasound helps us determine your dates accurately. Innatal (genetic screening test) can be drawn anytime after 10 weeks of gestation.    16 weeks: Optional genetic testing single AFP       This testing helps understand your baby's risk for some genetic abnormalities.    18-22 weeks:  Screening anatomy ultrasound       This testing will look for early growth abnormalities, placenta location, and may tell the baby's gender if you wish to find out.    24-27 weeks: One hour diabetes test (GCT) and complete blood count       This test helps identify diabetes of pregnancy or gestational diabetes.  We also look   at the iron in your blood and how well your blood clots.    28 - 36 weeks: Tetanus shot (Tdap)       This shot helps protect you and your baby from whooping cough.    36 weeks and later: Group B  Strep test (GBS)       This test helps predict if you need antibiotics in labor to prevent infection for your baby.    Anytime September to April:  Flu shot       This shot helps protect you and your family from the flu.  This is especially important during pregnancy.        The typical schedule after your first visit today you can expect:     Visit 2 - 12-16 weeks  Visit 3 - 20 weeks  Visit 4 - 24 weeks  Visit 5 - 28 weeks  Visit 6 - 30 weeks  Visit 7 - 32 weeks  Visit 8 - 34 weeks  Visit 9 - 36 weeks  Weekly after 36 weeks until delivery.        Any time during or after your pregnancy you may experience increased depression and/or mood changes.    We are here to support you. Please contact us if you are:  Feeling anxious  Overwhelmed or sad   Trouble sleeping  Crying uncontrollably  Trouble caring for yourself or baby.  Any thoughts of hurting yourself, your baby, or anyone else    If anything comes up between your visits or you have concerns please don't hesitate to contact us.    Secure access to your medical record:  Use [employer] (secure email communication and access to your chart) to send your primary care provider a message or make an appointment. Ask someone on your Team how to sign up for [employer]. To log on to [employer] or for more information in [employer] please visit the website at [email or web address].       Certified Nurse Midwife (CNM) Team    REJI Mojica CNM Melissa Kitzman APRN, CNM, [facility]  ALEJO Henry DNP, ALEJO Castillo DNP, REJI    Link to Midwifery Care website: [email or web address]      Again, thank you for choosing the midwives at [facility].  We are excited to be a part of your pregnancy! Please let us know how we can best partner with you to improve your and your family's health.

## 2022-08-26 LAB
HBV SURFACE AG SERPL QL IA: NONREACTIVE
HCV AB SERPL QL IA: NONREACTIVE
HIV 1+2 AB+HIV1 P24 AG SERPL QL IA: NONREACTIVE
RUBV IGG SERPL QL IA: 1.71 INDEX
RUBV IGG SERPL QL IA: POSITIVE
T PALLIDUM AB SER QL: NONREACTIVE

## 2022-09-01 LAB — SCANNED LAB RESULT: NORMAL

## 2022-09-15 ASSESSMENT — ANXIETY QUESTIONNAIRES
8. IF YOU CHECKED OFF ANY PROBLEMS, HOW DIFFICULT HAVE THESE MADE IT FOR YOU TO DO YOUR WORK, TAKE CARE OF THINGS AT HOME, OR GET ALONG WITH OTHER PEOPLE?: SOMEWHAT DIFFICULT
IF YOU CHECKED OFF ANY PROBLEMS ON THIS QUESTIONNAIRE, HOW DIFFICULT HAVE THESE PROBLEMS MADE IT FOR YOU TO DO YOUR WORK, TAKE CARE OF THINGS AT HOME, OR GET ALONG WITH OTHER PEOPLE: SOMEWHAT DIFFICULT
7. FEELING AFRAID AS IF SOMETHING AWFUL MIGHT HAPPEN: SEVERAL DAYS
5. BEING SO RESTLESS THAT IT IS HARD TO SIT STILL: NOT AT ALL
7. FEELING AFRAID AS IF SOMETHING AWFUL MIGHT HAPPEN: SEVERAL DAYS
3. WORRYING TOO MUCH ABOUT DIFFERENT THINGS: MORE THAN HALF THE DAYS
6. BECOMING EASILY ANNOYED OR IRRITABLE: MORE THAN HALF THE DAYS
1. FEELING NERVOUS, ANXIOUS, OR ON EDGE: SEVERAL DAYS
2. NOT BEING ABLE TO STOP OR CONTROL WORRYING: MORE THAN HALF THE DAYS
GAD7 TOTAL SCORE: 8
GAD7 TOTAL SCORE: 8
4. TROUBLE RELAXING: NOT AT ALL
GAD7 TOTAL SCORE: 8

## 2022-09-15 ASSESSMENT — PATIENT HEALTH QUESTIONNAIRE - PHQ9
SUM OF ALL RESPONSES TO PHQ QUESTIONS 1-9: 5
SUM OF ALL RESPONSES TO PHQ QUESTIONS 1-9: 5
10. IF YOU CHECKED OFF ANY PROBLEMS, HOW DIFFICULT HAVE THESE PROBLEMS MADE IT FOR YOU TO DO YOUR WORK, TAKE CARE OF THINGS AT HOME, OR GET ALONG WITH OTHER PEOPLE: SOMEWHAT DIFFICULT

## 2022-09-16 ENCOUNTER — VIRTUAL VISIT (OUTPATIENT)
Dept: PSYCHOLOGY | Facility: CLINIC | Age: 32
End: 2022-09-16
Payer: COMMERCIAL

## 2022-09-16 DIAGNOSIS — F33.0 MILD EPISODE OF RECURRENT MAJOR DEPRESSIVE DISORDER (H): Primary | ICD-10-CM

## 2022-09-16 DIAGNOSIS — F41.9 ANXIETY: ICD-10-CM

## 2022-09-16 DIAGNOSIS — F32.9 REACTIVE DEPRESSION: ICD-10-CM

## 2022-09-16 PROCEDURE — 90791 PSYCH DIAGNOSTIC EVALUATION: CPT | Mod: GT | Performed by: MARRIAGE & FAMILY THERAPIST

## 2022-09-16 ASSESSMENT — COLUMBIA-SUICIDE SEVERITY RATING SCALE - C-SSRS
2. HAVE YOU ACTUALLY HAD ANY THOUGHTS OF KILLING YOURSELF?: NO
6. HAVE YOU EVER DONE ANYTHING, STARTED TO DO ANYTHING, OR PREPARED TO DO ANYTHING TO END YOUR LIFE?: NO
1. HAVE YOU WISHED YOU WERE DEAD OR WISHED YOU COULD GO TO SLEEP AND NOT WAKE UP?: YES
1. IN THE PAST MONTH, HAVE YOU WISHED YOU WERE DEAD OR WISHED YOU COULD GO TO SLEEP AND NOT WAKE UP?: NO
ATTEMPT LIFETIME: NO
TOTAL  NUMBER OF INTERRUPTED ATTEMPTS LIFETIME: NO
TOTAL  NUMBER OF ABORTED OR SELF INTERRUPTED ATTEMPTS LIFETIME: NO

## 2022-09-16 NOTE — PROGRESS NOTES
Answers for HPI/ROS submitted by the patient on 9/15/2022  If you checked off any problems, how difficult have these problems made it for you to do your work, take care of things at home, or get along with other people?: Somewhat difficult  PHQ9 TOTAL SCORE: 5  ANNEMARIE 7 TOTAL SCORE: 8        [facility]  Provider Name:  Ruslan David     Credentials:  LMFT    PATIENT'S NAME: Beata Joseph  PREFERRED NAME: Beata  PRONOUNS:       MRN: [id number]  : 1990  ADDRESS: [address] [place] [id number]  ACCT. NUMBER:  [id number]  DATE OF SERVICE: 22  START TIME: 9:01a  END TIME: 9:59a  PREFERRED PHONE: [phone]  May we leave a program related message: Yes  SERVICE MODALITY:  Video Visit:      Provider verified identity through the following two step process.  Patient provided:  Patient     Telemedicine Visit: The patient's condition can be safely assessed and treated via synchronous audio and visual telemedicine encounter.      Reason for Telemedicine Visit: Services only offered telehealth    Originating Site (Patient Location): Patient's home    Distant Site (Provider Location): Provider Remote Setting- Home Office    Consent:  The patient/guardian has verbally consented to: the potential risks and benefits of telemedicine (video visit) versus in person care; bill my insurance or make self-payment for services provided; and responsibility for payment of non-covered services.     Patient would like the video invitation sent by:  Send to e-mail at: [email or web address]    Mode of Communication:  Video Conference via [facility]    As the provider I attest to compliance with applicable laws and regulations related to telemedicine.    UNIVERSAL ADULT Mental Health DIAGNOSTIC ASSESSMENT    Identifying Information:  Patient is a 31 year old,   individual.    Patient was referred for an assessment by primary care clinic.  Patient attended the session .    Chief Complaint:    PT currently 14 weeks pregnant and has struggled with some anxiety and depressive sx for about 2.5 years and again in college. Pt currently on medication which is helping and has taken meds in the past. COVID was a challenge and pt was working in person during this time and stress has increased. Depression sx have increased with pregnancy, normally more social but that has decreased. 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 Numerous deaths pt witnessed. PT got COVID in Jan 2022, mild sx and no long haul sx at this time.   -Through 2022, sx have been escalating with not wanting to engage socially, more fatigue, increased irritability. With cutting out alcohol, anxiety sx have improved.     Goals: Learn to manage Mh sx to decrease effect.     Coping: Reading, watching TV,     Social/Family History:  Patient reported they grew up in [place], [place].  They were raised by biological parents.  Parents stayed . PT has 2 half siblings but were raised together. Brother and sister are about 6 years older than pt.   Patient reported that their childhood was early childhood was great, teens were hard due to father being [place] overseas.  Patient described their current relationships with family of origin as very positive. Brother lives in [place], sister lives near [facility], parents live in [place].      Patient reported had no significant delays in developmental tasks.   Patient's highest education level was college graduate. Patient identified the following learning problems: none reported.  Modifications will not be used to assist communication in therapy.   Patient reports they are  able to understand written materials.    Patient's current relationship status is  for 1 year. PT notes that when she is home, she will take out her struggles towards  him which she does not want to do. Patient identified their sexual orientation as heterosexual.  Patient reported having zero child(rubén). Patient identified mother, siblings, friends and spouse as part of their support system.  Patient identified the quality of these relationships as stable and meaningful.     Patient's current living/housing situation involves staying in own home/apartment.  They live with  and they report that housing is stable.    Patient is currently employed full time and reports they are able to function appropriately at work. 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 Pt has been working there for 6 years. At work, more physical and mental fatigue, challenges multitasking.      Patient reports their finances are obtained through employment.  Patient does not identify finances as a current stressor.      Patient reported that they have not been involved with the legal system.   Patient denies being on probation / parole / under the jurisdiction of the court.      Patient's Strengths and Limitations:  Patient identified the following strengths or resources that will help them succeed in treatment: commitment to health and well being and friends / good social support. Things that may interfere with the patient's success in treatment include: physical health concerns.     Assessments:  The following assessments were completed by patient for this visit:  PHQ9:   PHQ-9 SCORE 6/9/2021 6/13/2022 7/4/2022 7/27/2022 8/18/2022 8/23/2022 9/15/2022   PHQ-9 Total Score [facility] 0 - 7 (Mild depression) - 8 (Mild depression) 9 (Mild depression) 5 (Mild depression)   PHQ-9 Total Score 0 10 7 3 8 9 5     GAD7:   ANNEMARIE-7 SCORE 6/9/2021 7/7/2021 10/26/2021 6/13/2022 7/4/2022 8/18/2022 9/15/2022   Total Score 4 (minimal anxiety) - - - 8 (mild anxiety) 6 (mild anxiety) 8 (mild anxiety)   Total Score 4 8 9 14 8 6 8     PROMIS 10-Global Health (all questions and answers  displayed):   PROMIS 10 9/15/2022   In general, would you say your health is: Good   In general, would you say your quality of life is: Good   In general, how would you rate your physical health? Good   In general, how would you rate your mental health, including your mood and your ability to think? Fair   In general, how would you rate your satisfaction with your social activities and relationships? Good   In general, please rate how well you carry out your usual social activities and roles Good   To what extent are you able to carry out your everyday physical activities such as walking, climbing stairs, carrying groceries, or moving a chair? Mostly   How often have you been bothered by emotional problems such as feeling anxious, depressed or irritable? Often   How would you rate your fatigue on average? Moderate   How would you rate your pain on average?   0 = No Pain  to  10 = Worst Imaginable Pain 2   In general, would you say your health is: 3   In general, would you say your quality of life is: 3   In general, how would you rate your physical health? 3   In general, how would you rate your mental health, including your mood and your ability to think? 2   In general, how would you rate your satisfaction with your social activities and relationships? 3   In general, please rate how well you carry out your usual social activities and roles. (This includes activities at home, at work and in your community, and responsibilities as a parent, child, spouse, employee, friend, etc.) 3   To what extent are you able to carry out your everyday physical activities such as walking, climbing stairs, carrying groceries, or moving a chair? 4   In the past 7 days, how often have you been bothered by emotional problems such as feeling anxious, depressed, or irritable? 4   In the past 7 days, how would you rate your fatigue on average? 3   In the past 7 days, how would you rate your pain on average, where 0 means no pain, and 10  means worst imaginable pain? 2   Global Mental Health Score 10   Global Physical Health Score 14   PROMIS TOTAL - SUBSCORES 24   Some recent data might be hidden       Personal and Family Medical History:  Patient does report a family history of mental health concerns.  Patient reports family history includes Depression in her father and sister; Diabetes in her maternal grandfather; Hyperlipidemia in her mother; Hypertension in her maternal grandmother; Mental Illness in her father; No Known Problems in her brother, paternal grandmother, and another family member; Thyroid Disease in her mother. Father has Bipolar Dx, sister has depression, maternal side has high amounts of anxiety.    Patient does report Mental Health Diagnosis and/or Treatment.  Patient Patient reported the following previous diagnoses which include(s): an Anxiety Disorder and Depression.  Patient reported symptoms began college.   Patient has received mental health services in the past: medication.  Psychiatric Hospitalizations: None.  Patient denies a history of civil commitment.  Patient is receiving other mental health services.  These include medication through PCP.         Patient has had a physical exam to rule out medical causes for current symptoms.  Date of last physical exam was within the past year. Client was encouraged to follow up with PCP if symptoms were to develop. The patient has a [facility] Provider, who is named Serina Munguia..  Patient reports no current medical concerns.  Patient denies any issues with pain..   There are not significant appetite / nutritional concerns / weight changes. These may include: no concerns. Patient reports the following sleep concerns:  No concerns.   Patient does not report a history of head injury / trauma / cognitive impairment.      Patient reports current meds as:   Wellbutrin    Medication Adherence:  Patient reports taking prescribed medications as prescribed.    Patient  Allergies:    Allergies   Allergen Reactions     Cephalosporins Nausea and Vomiting     Cats Hives     No Clinical Screening - See Comments Other (See Comments)     Pollens and animal dander     Pollen Extract      Other reaction(s): Sneezing       Medical History:    Past Medical History:   Diagnosis Date     Abnormal cervical Papanicolaou smear 05/16/2018 5/16/18, 06/09/21     Cervical high risk HPV (human papillomavirus) test positive 05/16/2018 05/16/18, 05/22/2019, 07/15/20, 06/9/21     Depressive disorder 01/2020     Gastroesophageal reflux disease without esophagitis 7/22/2020     Urinary tract infection          Current Mental Status Exam:   Appearance:  Appropriate    Eye Contact:  Good   Psychomotor:  Normal       Gait / station:  no problem  Attitude / Demeanor: Cooperative   Speech      Rate / Production: Normal/ Responsive      Volume:  Normal  volume      Language:  intact  Mood:   Anxious   Affect:   Flat    Thought Content: Clear   Thought Process: Coherent       Associations: No loosening of associations  Insight:   Good   Judgment:  Intact   Orientation:  All  Attention/concentration: Good      Substance Use:  Patient did not report a family history of substance use concerns; see medical history section for details.  Patient has not received chemical dependency treatment in the past.  Patient has not ever been to detox.      Patient is not currently receiving any chemical dependency treatment. Patient reported the following problems as a result of their substance use:  none.    Patient denies using alcohol. However, prior to pregnancy she was having around 15 drinks weekly due to stress  Patient denies using tobacco.  Patient denies using cannabis.  Patient will drink around 2 drinks daily  Patient reports using/abusing the following substance(s). Patient reported no other substance use.     Substance Use: No symptoms    Based on the negative CAGE score and clinical interview there  are not  indications of drug or alcohol abuse.      Significant Losses / Trauma / Abuse / Neglect Issues:   Patient did not serve in the .  There are indications or report of significant loss, trauma, abuse or neglect issues related to: are no indications and client denies any losses, trauma, abuse, or neglect concerns.  Concerns for possible neglect are not present.     Safety Assessment:   Patient denies current homicidal ideation and behaviors.  Patient denies current self-injurious ideation and behaviors.    Patient denied risk behaviors associated with substance use.  Patient denies any high risk behaviors associated with mental health symptoms.  Patient reports the following current concerns for their personal safety: None.  Patient reports there are firearms in the house.     yes, they are secured. .    History of Safety Concerns:  Patient denied a history of homicidal ideation.     Patient denied a history of personal safety concerns.    Patient denied a history of assaultive behaviors.    Patient denied a history of sexual assault behaviors.     Patient denied a history of risk behaviors associated with substance use.  Patient denies any history of high risk behaviors associated with mental health symptoms.  Patient reports the following protective factors: forward or future oriented thinking; dedication to family or friends; safe and stable environment; regular sleep; effectively controls impulses; regular physical activity; sense of belonging; purpose; secure attachment; help seeking behaviors when distressed; adherence with prescribed medication; living with other people; daily obligations; structured day; effective problem solving skills; commitment to well being; sense of meaning; positive social skills; healthy fear of risky behaviors or pain; financial stability; strong sense of self worth or esteem; sense of personal control or determination; access to a variety of clinical interventions and  pets    Risk Plan:  See Recommendations for Safety and Risk Management Plan    Review of Symptoms per patient report:  Depression: Lack of interest, Change in energy level, Low self-worth, Irritability, Feeling sad, down, or depressed, Withdrawn and Frequent crying  Faina:  No Symptoms  Psychosis: No Symptoms  Anxiety: Excessive worry, Nervousness, Social anxiety, Irritability and Anger outbursts  Panic:  No symptoms  Post Traumatic Stress Disorder:  No Symptoms   Eating Disorder: No Symptoms  ADD / ADHD:  No symptoms  Conduct Disorder: No symptoms  Autism Spectrum Disorder: No symptoms  Obsessive Compulsive Disorder: No Symptoms    Patient reports the following compulsive behaviors and treatment history: Picking - has had treatment..      Diagnostic Criteria:   Major Depressive Disorder  A) Recurrent episode(s) - symptoms have been present during the same 2-week period and represent a change from previous functioning 5 or more symptoms (required for diagnosis)   - Depressed mood. Note: In children and adolescents, can be irritable mood.     - Diminished interest or pleasure in all, or almost all, activities.    - Fatigue or loss of energy.    - Feelings of worthlessness or inappropriate and excessive guilt.    - Diminished ability to think or concentrate, or indecisiveness.       Functional Status:  Patient reports the following functional impairments:  health maintenance, relationship(s) and self-care.         Clinical Summary:  1. Reason for assessment: Ongoing Mh issues  .  2. 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  3. Principal DSM5 Diagnoses  (Sustained by DSM5 Criteria Listed Above):   296.31 (F33.0) Major Depressive Disorder, Recurrent Episode, Mild _.  4. Other Diagnoses that is relevant to services:   300.02 (F41.1) Generalized Anxiety Disorder.    6. Prognosis: Expect Improvement.  7. Likely consequences of symptoms if not treated: .  8. Client strengths  include:  employed .     Recommendations:     1. Plan for Safety and Risk Management:   Safety and Risk: Recommended that patient call 911 or go to the local ED should there be a change in any of these risk factors..          Report to child / adult protection services was NA.   8. Records:   These were reviewed at time of assessment.   Information in this assessment was obtained from the medical record and  provided by patient who is a good historian.    Patient will have open access to their mental health medical record.        Provider Name/ Credentials:  JENISE Perez  September 16, 2022

## 2022-09-17 ENCOUNTER — HEALTH MAINTENANCE LETTER (OUTPATIENT)
Age: 32
End: 2022-09-17

## 2022-09-20 NOTE — PROGRESS NOTES
14w3d  Feels well, here with Lewis. Some tachycardia 100-120 with activity at work, also noticing increasing SOB, some lingering nausea but much better and nausea resolved. Still lots of fatigue. Work has been very busy  Happy to hear FHT for the first time!  Fetal movement No  Denies loss of fluid/vb/contractions/pelvic pain  Will consider AFP next time  Discussed normal movement 18-22 weeks  Anatomy ultrasound next visit between 18-22 weeks  Reviewed increasing hydration, carbs, salt, continuing to monitor closely, if occurs at rest/palpitations etc then would recommend cardiac consult  Return to clinic 4-5 weeks for anatomy; order placed    ALEJO Latif, MADELINEM

## 2022-09-22 ENCOUNTER — PRENATAL OFFICE VISIT (OUTPATIENT)
Dept: MIDWIFE SERVICES | Facility: CLINIC | Age: 32
End: 2022-09-22
Payer: COMMERCIAL

## 2022-09-22 VITALS — SYSTOLIC BLOOD PRESSURE: 114 MMHG | DIASTOLIC BLOOD PRESSURE: 78 MMHG | WEIGHT: 177 LBS | BODY MASS INDEX: 26.14 KG/M2

## 2022-09-22 DIAGNOSIS — Z3A.14 14 WEEKS GESTATION OF PREGNANCY: ICD-10-CM

## 2022-09-22 DIAGNOSIS — R03.0 WHITE COAT SYNDROME WITHOUT DIAGNOSIS OF HYPERTENSION: ICD-10-CM

## 2022-09-22 DIAGNOSIS — F41.9 ANXIETY: ICD-10-CM

## 2022-09-22 DIAGNOSIS — O09.92 SUPERVISION OF HIGH RISK PREGNANCY IN SECOND TRIMESTER: Primary | ICD-10-CM

## 2022-09-22 PROCEDURE — 99207 PR PRENATAL VISIT: CPT | Performed by: ADVANCED PRACTICE MIDWIFE

## 2022-10-07 ENCOUNTER — VIRTUAL VISIT (OUTPATIENT)
Dept: PSYCHOLOGY | Facility: CLINIC | Age: 32
End: 2022-10-07
Payer: COMMERCIAL

## 2022-10-07 DIAGNOSIS — F41.9 ANXIETY: ICD-10-CM

## 2022-10-07 DIAGNOSIS — F33.0 MILD EPISODE OF RECURRENT MAJOR DEPRESSIVE DISORDER (H): ICD-10-CM

## 2022-10-07 DIAGNOSIS — F41.1 GAD (GENERALIZED ANXIETY DISORDER): Primary | ICD-10-CM

## 2022-10-07 PROCEDURE — 90834 PSYTX W PT 45 MINUTES: CPT | Mod: 95 | Performed by: MARRIAGE & FAMILY THERAPIST

## 2022-10-07 ASSESSMENT — COLUMBIA-SUICIDE SEVERITY RATING SCALE - C-SSRS
TOTAL  NUMBER OF ABORTED OR SELF INTERRUPTED ATTEMPTS SINCE LAST CONTACT: NO
ATTEMPT SINCE LAST CONTACT: NO
6. HAVE YOU EVER DONE ANYTHING, STARTED TO DO ANYTHING, OR PREPARED TO DO ANYTHING TO END YOUR LIFE?: NO
1. SINCE LAST CONTACT, HAVE YOU WISHED YOU WERE DEAD OR WISHED YOU COULD GO TO SLEEP AND NOT WAKE UP?: NO
2. HAVE YOU ACTUALLY HAD ANY THOUGHTS OF KILLING YOURSELF?: NO
SUICIDE, SINCE LAST CONTACT: NO
TOTAL  NUMBER OF INTERRUPTED ATTEMPTS SINCE LAST CONTACT: NO

## 2022-10-07 NOTE — PROGRESS NOTES
[facility]                                     Progress Note    Patient Name: Beata Joseph  Date: 10/7/22         Service Type: Individual      Session Start Time: 10:02a  Session End Time: 10:54a     Session Length: 52    Session #: 2    Attendees: Client attended alone    Service Modality:  Video Visit:      Provider verified identity through the following two step process.  Patient provided:  Patient     Telemedicine Visit: The patient's condition can be safely assessed and treated via synchronous audio and visual telemedicine encounter.      Reason for Telemedicine Visit: Services only offered telehealth    Originating Site (Patient Location): Patient's home    Distant Site (Provider Location): [facility] [place] [facility]    Consent:  The patient/guardian has verbally consented to: the potential risks and benefits of telemedicine (video visit) versus in person care; bill my insurance or make self-payment for services provided; and responsibility for payment of non-covered services.     Patient would like the video invitation sent by:  Send to e-mail at: [email or web address]    Mode of Communication:  Video Conference via [employer]    As the provider I attest to compliance with applicable laws and regulations related to telemedicine.    DATA  Interactive Complexity: No  Crisis: No        Progress Since Last Session (Related to Symptoms / Goals / Homework):   Symptoms: No change Mood at 5/10    Homework: Completed in session      Episode of Care Goals: Satisfactory progress - ACTION (Actively working towards change); Intervened by reinforcing change plan / affirming steps taken     Current / Ongoing Stressors and Concerns:   Last session , pt is not having any pregnancy related issues at this time. Pt continues with meds and is having success with controlling sx. Pt notes some fatigue with the early pregnancy and some increased heart rate. Work is  going ok, but she is not feeling as passionate as she once did, trying to just get through the day. Outside of work, she is endorsing feeling better not at work when at home and the fatigue remains into the evenings.     Goal: Use CBT at least 1x weekly     Treatment Objective(s) Addressed in This Session:   use relaxation strategies 3 times per day to reduce the physical symptoms of anxiety       Intervention:   Motivational Interviewing    MI Intervention: Permission to raise concern or advise, Open-ended questions and Reflections: simple and complex     Change Talk Expressed by the Patient: Reasons to change Need to change Committment to change    Provider Response to Change Talk: E - Evoked more info from patient about behavior change and A - Affirmed patient's thoughts, decisions, or attempts at behavior change        ASSESSMENT: Current Emotional / Mental Status (status of significant symptoms):   Risk status (Self / Other harm or suicidal ideation)   Patient denies current fears or concerns for personal safety.   Patient denies current or recent suicidal ideation or behaviors.   Patient denies current or recent homicidal ideation or behaviors.   Patient denies current or recent self injurious behavior or ideation.   Patient denies other safety concerns.   Patient reports there has been no change in risk factors since their last session.     Patient reports there has been no change in protective factors since their last session.     Recommended that patient call 911 or go to the local ED should there be a change in any of these risk factors.     Appearance:   Appropriate    Eye Contact:   Good    Psychomotor Behavior: Normal    Attitude:   Cooperative    Orientation:   All   Speech    Rate / Production: Normal     Volume:  Normal    Mood:    Normal   Affect:    Appropriate    Thought Content:  Clear    Thought Form:  Coherent  Logical    Insight:    Good      Medication Review:   No changes to current  psychiatric medication(s)     Medication Compliance:   Yes     Changes in Health Issues:   None reported     Chemical Use Review:   Substance Use: Chemical use reviewed, no active concerns identified      Tobacco Use: No current tobacco use.      Diagnosis:  ANNEMARIE    Collateral Reports Completed:   Not Applicable    PLAN: (Patient Tasks / Therapist Tasks / Other)  PT will engage in using CBT log 1x weekly        JENISE Blanco 10/7/22                                                         ______________________________________________________________________    Individual Treatment Plan    Patient's Name: Beata Joseph  YOB: 1990    Date of Creation: 10/7/22  Date Treatment Plan Last Reviewed/Revised: 1/7/23    DSM5 Diagnoses: 300.02 (F41.1) Generalized Anxiety Disorder  Psychosocial / Contextual Factors:   PROMIS (reviewed every 90 days):     Referral / Collaboration:  Referral to another professional/service is not indicated at this time..    Anticipated number of session for this episode of care: 6-9 sessions  Anticipation frequency of session: Monthly  Anticipated Duration of each session: 38-52 minutes  Treatment plan will be reviewed in 90 days or when goals have been changed.       MeasurableTreatment Goal(s) related to diagnosis / functional impairment(s)  Goal 1: Patient will engage in mchugh regulation exercise for anxiety reduction    Objective #A (Patient Action)    Patient will use relaxation strategies 3 times per day to reduce the physical symptoms of anxiety.  Status: New - Date: 10/7/22     Intervention(s)  Therapist will teach emotional regulation skills. Body calming.      Patient has reviewed and agreed to the above plan.      JENISE Blanco  October 7, 2022

## 2022-10-17 NOTE — PROGRESS NOTES
18w0d  Feels okay overall. Still notices a high HR during work hours, thinking mostly related to anxiety. Endorses some SOB with activity but not at rest, no palpitations/chest pain.     Beata feels stable from a depression standpoint on her Wellbutrin. Regarding anxiety, she is generally able to sleep well, maintain a healthy diet and get adequate exercise. She's started therapy and thinks that is helping. Desires to stay on her current dose of Wellbutrin and will consider using the Calm vic or other meditation options. Encouraged to take frequent breaks at work, hydrate and monitor for any changes.     Fetal movement: Yes   Denies loss of fluid/vb/contractions/pelvic pain  Declined AFP today  GC/Chlamydia urine collection today - declined  Anatomy ultrasound next visit between 18-22 weeks  Discussed cardiac changes in pregnancy and with anxiety. Advised to closely monitor symptoms, if tachycardic at rest or sx of SOB at rest, chest pain, palpitations then she should call for further evaluation and possible Cards referral    Return to clinic 4 weeks    REBEKAH Baires  [facility]    I, Enedina Bolivar, am serving as a scribe; to document services personally performed by Conchita DHILLON CNM- based on data collection and the provider's statements to me.    Provider Disclosure:  I was present with the student who participated in the service and in the documentation of the note. I have verified the history and personally performed the physical exam and medical decision-making. I agree with the assessment and plan of care as documented in the note.      ALEJO Bazzi CNM

## 2022-10-19 ENCOUNTER — PRENATAL OFFICE VISIT (OUTPATIENT)
Dept: MIDWIFE SERVICES | Facility: CLINIC | Age: 32
End: 2022-10-19
Payer: COMMERCIAL

## 2022-10-19 VITALS — DIASTOLIC BLOOD PRESSURE: 80 MMHG | SYSTOLIC BLOOD PRESSURE: 120 MMHG | WEIGHT: 177.4 LBS | BODY MASS INDEX: 26.2 KG/M2

## 2022-10-19 DIAGNOSIS — R03.0 WHITE COAT SYNDROME WITHOUT DIAGNOSIS OF HYPERTENSION: ICD-10-CM

## 2022-10-19 DIAGNOSIS — F33.0 MILD EPISODE OF RECURRENT MAJOR DEPRESSIVE DISORDER (H): ICD-10-CM

## 2022-10-19 DIAGNOSIS — F41.9 ANXIETY: ICD-10-CM

## 2022-10-19 DIAGNOSIS — O09.92 SUPERVISION OF HIGH RISK PREGNANCY IN SECOND TRIMESTER: Primary | ICD-10-CM

## 2022-10-19 PROCEDURE — 99207 PR PRENATAL VISIT: CPT | Performed by: ADVANCED PRACTICE MIDWIFE

## 2022-10-28 ENCOUNTER — VIRTUAL VISIT (OUTPATIENT)
Dept: PSYCHOLOGY | Facility: CLINIC | Age: 32
End: 2022-10-28
Payer: COMMERCIAL

## 2022-10-28 DIAGNOSIS — F41.1 GAD (GENERALIZED ANXIETY DISORDER): Primary | ICD-10-CM

## 2022-10-28 PROCEDURE — 90834 PSYTX W PT 45 MINUTES: CPT | Mod: 95 | Performed by: MARRIAGE & FAMILY THERAPIST

## 2022-10-28 NOTE — PROGRESS NOTES
[facility]                                     Progress Note    Patient Name: Beata Joseph  Date: 10/28/22         Service Type: Individual      Session Start Time: 10:00a  Session End Time: 10:52a     Session Length: 52    Session #: 3    Attendees: Client attended alone    Service Modality:  Video Visit:      Provider verified identity through the following two step process.  Patient provided:  Patient     Telemedicine Visit: The patient's condition can be safely assessed and treated via synchronous audio and visual telemedicine encounter.      Reason for Telemedicine Visit: Services only offered telehealth    Originating Site (Patient Location): Patient's home    Distant Site (Provider Location): Provider Remote Setting- Home Office    Consent:  The patient/guardian has verbally consented to: the potential risks and benefits of telemedicine (video visit) versus in person care; bill my insurance or make self-payment for services provided; and responsibility for payment of non-covered services.     Patient would like the video invitation sent by:  Send to e-mail at: [email or web address]    Mode of Communication:  Video Conference via Amwell    Distant Location (Provider):  Off-site    As the provider I attest to compliance with applicable laws and regulations related to telemedicine.    DATA  Interactive Complexity: No  Crisis: No        Progress Since Last Session (Related to Symptoms / Goals / Homework):   Symptoms: No change Mood at 5/10    Homework: Completed in session      Episode of Care Goals: Satisfactory progress - ACTION (Actively working towards change); Intervened by reinforcing change plan / affirming steps taken     Current / Ongoing Stressors and Concerns:   Last session 10/7, pt notes the pregnancy is going well and the baby is healthy but she is noticing anxiety sx increasing slightly mostly at work. She will be decreasing her hours at the end of the year which she hopes  will be a positive change. She can note that all of her appts go well but she is feeling tense prior to getting to work.    Goal: Modifications to current workflow schedule     Treatment Objective(s) Addressed in This Session:   identify three distraction and diversion activities and use those activities to decrease level of anxiety         Intervention:   Motivational Interviewing    MI Intervention: Supported Autonomy, Collaboration, Evocation, Permission to raise concern or advise and Open-ended questions     Change Talk Expressed by the Patient: Reasons to change Need to change Committment to change    Provider Response to Change Talk: E - Evoked more info from patient about behavior change, A - Affirmed patient's thoughts, decisions, or attempts at behavior change and R - Reflected patient's change talk      ASSESSMENT: Current Emotional / Mental Status (status of significant symptoms):   Risk status (Self / Other harm or suicidal ideation)   Patient denies current fears or concerns for personal safety.   Patient denies current or recent suicidal ideation or behaviors.   Patient denies current or recent homicidal ideation or behaviors.   Patient denies current or recent self injurious behavior or ideation.   Patient denies other safety concerns.   Patient reports there has been no change in risk factors since their last session.     Patient reports there has been no change in protective factors since their last session.     Recommended that patient call 911 or go to the local ED should there be a change in any of these risk factors.     Appearance:   Appropriate    Eye Contact:   Good    Psychomotor Behavior: Normal    Attitude:   Cooperative    Orientation:   All   Speech    Rate / Production: Normal     Volume:  Normal    Mood:    Normal   Affect:    Appropriate    Thought Content:  Clear    Thought Form:  Coherent  Logical    Insight:    Good      Medication Review:   No changes to current psychiatric  medication(s)     Medication Compliance:   Yes     Changes in Health Issues:   None reported     Chemical Use Review:   Substance Use: Chemical use reviewed, no active concerns identified      Tobacco Use: No current tobacco use.      Diagnosis:  ANNEMARIE    Collateral Reports Completed:   Not Applicable    PLAN: (Patient Tasks / Therapist Tasks / Other)  PT will engage in 1x daily healthy coping skills        JENISE Blanco   10/31/22                                                         ______________________________________________________________________  Individual Treatment Plan     Patient's Name: Beata Joseph                      YOB: 1990     Date of Creation: 10/7/22  Date Treatment Plan Last Reviewed/Revised: 1/7/23     DSM5 Diagnoses: 300.02 (F41.1) Generalized Anxiety Disorder  Psychosocial / Contextual Factors:   PROMIS (reviewed every 90 days):      Referral / Collaboration:  Referral to another professional/service is not indicated at this time..     Anticipated number of session for this episode of care: 6-9 sessions  Anticipation frequency of session: Monthly  Anticipated Duration of each session: 38-52 minutes  Treatment plan will be reviewed in 90 days or when goals have been changed.         MeasurableTreatment Goal(s) related to diagnosis / functional impairment(s)  Goal 1: Patient will engage in mchugh regulation exercise for anxiety reduction     Objective #A (Patient Action)                          Patient will use relaxation strategies 3 times per day to reduce the physical symptoms of anxiety.  Status: New - Date: 10/7/22      Intervention(s)  Therapist will teach emotional regulation skills. Body calming.        Patient has reviewed and agreed to the above plan.        JENISE Blanco                       October 7, 2022

## 2022-11-10 ENCOUNTER — PRENATAL OFFICE VISIT (OUTPATIENT)
Dept: MIDWIFE SERVICES | Facility: CLINIC | Age: 32
End: 2022-11-10
Attending: ADVANCED PRACTICE MIDWIFE
Payer: COMMERCIAL

## 2022-11-10 ENCOUNTER — ANCILLARY PROCEDURE (OUTPATIENT)
Dept: ULTRASOUND IMAGING | Facility: CLINIC | Age: 32
End: 2022-11-10
Attending: ADVANCED PRACTICE MIDWIFE
Payer: COMMERCIAL

## 2022-11-10 VITALS — BODY MASS INDEX: 26.32 KG/M2 | DIASTOLIC BLOOD PRESSURE: 80 MMHG | SYSTOLIC BLOOD PRESSURE: 122 MMHG | WEIGHT: 178.2 LBS

## 2022-11-10 DIAGNOSIS — O09.92 SUPERVISION OF HIGH RISK PREGNANCY IN SECOND TRIMESTER: Primary | ICD-10-CM

## 2022-11-10 DIAGNOSIS — O09.92 SUPERVISION OF HIGH RISK PREGNANCY IN SECOND TRIMESTER: ICD-10-CM

## 2022-11-10 DIAGNOSIS — K21.9 GASTROESOPHAGEAL REFLUX DISEASE WITHOUT ESOPHAGITIS: ICD-10-CM

## 2022-11-10 DIAGNOSIS — Z3A.21 21 WEEKS GESTATION OF PREGNANCY: ICD-10-CM

## 2022-11-10 DIAGNOSIS — F41.9 ANXIETY: ICD-10-CM

## 2022-11-10 PROCEDURE — 99207 PR PRENATAL VISIT: CPT | Performed by: NURSE PRACTITIONER

## 2022-11-10 PROCEDURE — 76805 OB US >/= 14 WKS SNGL FETUS: CPT | Performed by: OBSTETRICS & GYNECOLOGY

## 2022-11-10 RX ORDER — FAMOTIDINE 20 MG/1
20 TABLET, FILM COATED ORAL 2 TIMES DAILY
COMMUNITY
Start: 2022-11-10 | End: 2023-02-17

## 2022-11-10 NOTE — PROGRESS NOTES
21w3d  Here with Lewis today.  Feels overall good.  She states that she is having intermittent dizziness with position changes, resolve spontaneously.   Also states that she has noticed an increase in anxiety.  She has been working with a therapist which is helping, but is considering also increasing Wellbutrin dosage.   Has also had increase in heartburn.   Fetal movement: positive   Denies loss of fluid/vb/contractions  Anatomy ultrasound prelim results discussed; to be reviewed by OB, having a Girl, Placenta:  Anterior  Handout given on Wellbutrin in pregnancy, will use to discuss with primary care provider who prescribes   PPSM handout given, encouraged to continue to work with therapist  Prescription for pepcid and omeprazole given.  Counseled to start with Pepcid PO BID, add omeprazole PO q d if signs and symptoms continue  GCT visit between 24-28 weeks, handout provided, reminded of longer appointment  Round ligament pain and comfort measures reviewed  Return to clinic 4 weeks    Radha DHILLON CNM, [facility]  [phone]

## 2022-11-10 NOTE — PATIENT INSTRUCTIONS
SIGNS OF  LABOR    Labor is  if it happens more than three weeks before your due date.    It can be hard to know if you are in labor, since the symptoms can be like the normal feelings of pregnancy.  Often, the only difference is the symptoms increase or they don't go away.     Signs of  labor can include:    Contractions which can feel like period cramps or gas pain.  You may feel it in the lower part of your abdomen, in your back, or as a pressure feeling in your bottom.  It is often regular, coming every 5 or 10 minutes, and  lasting about 30-60 seconds. Some contractions are normal during pregnancy ([place] dupree contractions) but if you are feeling more than 5-6 in one hour that is NOT normal  If this occurs empty your bladder, then drink 2-3 glasses of water, eat a snack, and lay down on your left side. Put your hand on your abdomen to count the contractions.  If after one hour of resting you have still had 5-6 contractions call your clinic right away.    If you feel a pop, gush, or trickle of fluid it may mean that your bag of water has broken and you should contact the clinic     You may also experience loose stools and/or rectal pressure     Listen to your body, if something doesn't seem right please call us at the clinic    Risk Factors    Previous  delivery  Bacterial Vaginosis- if you notice a fishy smell to your discharge or experience vaginal itching/discomfort you should be evaluated for infection  Smoking  Drug abuse  Adolescent (teen) pregnancy or advanced maternal age (AMA) age 35 and over  Dehydration (this may not cause  labor but it can cause contractions)    If you think you are in  labor we may do some lab testing in the clinic or send you to the hospital for evaluation    Please call us if you are concerned you are in  labor.    [facility]  [phone]   Round Ligament Pain    Pregnancy can entail many normal  discomforts. One of those discomforts may be round ligament pain. Round ligament pain occurs as your uterus and your baby grow and the muscles begin to stretch more in your abdomen. Round ligament pain is normal and not dangerous but can be very uncomfortable    Round ligament pain is typically described as an unpleasant sensation that ranges from a sharp knifelike pain to dull intermittent pain in the lower abdominal/suprapubic area of a pregnant woman    Virtually all pregnant women will experience this pain at some point during their pregnancies    It typically manifests between 16 and 20 weeks of pregnancy and can be incredibly bothersome especially for women who remain very active during their pregnancies     Please discuss with your midwife if you are having this type of pain; sometimes the pain can be associated with other medical conditions so it is important for us to assess you just to make sure    Comfort measures    There are certain things you can do to cope with round ligament pain and ease the discomfort you are having    Pregnancy support belt    Tylenol    Hot/ice packs    Baths     Exercise such as yoga and swimming that help stretch muscles    Prenatal massage    Reflexology to waist and pelvic joints     Positioning such as side-lying and hands and knees, make sure your abdomen is  well supported with pillows while doing different positions   GESTATIONAL DIABETES SCREENING    All pregnant women are screened at least once for diabetes as part of their prenatal care. A woman has gestational diabetes if she has high blood sugars for the first time during pregnancy.    Diabetes can harm your health and the health of your baby.  But if we find the diabetes early in pregnancy we will watch your health closely and prevent further problems.     We will check for gestational diabetes during your visit between 24-28 weeks visit. Please note you can not do this prior to 24 weeks of gestation.    Plan to  spend about an hour at the clinic.  When you check in let us know that you will be having your diabetes screening that day.     We will give you a 50 gram glucose drink that you have 5 minutes to consume.  Exactly one hour later you will have draw blood from your arm to check your blood sugar level.    We will call you to let you know if your results are normal.  If the results are normal no more testing will be needed.  If your results are not normal we will discuss follow up testing with you.      You may eat prior to the testing but it is not recommended to eat or drink very sweet things such as pop, juice, candy or dessert type foods.  Eat a high protein, low carb meals prior to testing.    If you have any questions please call:    [facility]    [phone]

## 2022-11-11 ENCOUNTER — TRANSCRIBE ORDERS (OUTPATIENT)
Dept: MATERNAL FETAL MEDICINE | Facility: CLINIC | Age: 32
End: 2022-11-11

## 2022-11-11 DIAGNOSIS — O26.90 PREGNANCY RELATED CONDITION: Primary | ICD-10-CM

## 2022-11-11 DIAGNOSIS — O28.3 ABNORMAL FETAL ULTRASOUND: Primary | ICD-10-CM

## 2022-11-14 ENCOUNTER — PRE VISIT (OUTPATIENT)
Dept: MATERNAL FETAL MEDICINE | Facility: CLINIC | Age: 32
End: 2022-11-14

## 2022-11-14 ENCOUNTER — VIRTUAL VISIT (OUTPATIENT)
Dept: PSYCHOLOGY | Facility: CLINIC | Age: 32
End: 2022-11-14
Payer: COMMERCIAL

## 2022-11-14 DIAGNOSIS — F41.1 GAD (GENERALIZED ANXIETY DISORDER): Primary | ICD-10-CM

## 2022-11-14 PROCEDURE — 90834 PSYTX W PT 45 MINUTES: CPT | Mod: 95 | Performed by: MARRIAGE & FAMILY THERAPIST

## 2022-11-14 ASSESSMENT — ANXIETY QUESTIONNAIRES
1. FEELING NERVOUS, ANXIOUS, OR ON EDGE: MORE THAN HALF THE DAYS
5. BEING SO RESTLESS THAT IT IS HARD TO SIT STILL: NOT AT ALL
4. TROUBLE RELAXING: SEVERAL DAYS
GAD7 TOTAL SCORE: 13
6. BECOMING EASILY ANNOYED OR IRRITABLE: MORE THAN HALF THE DAYS
8. IF YOU CHECKED OFF ANY PROBLEMS, HOW DIFFICULT HAVE THESE MADE IT FOR YOU TO DO YOUR WORK, TAKE CARE OF THINGS AT HOME, OR GET ALONG WITH OTHER PEOPLE?: SOMEWHAT DIFFICULT
IF YOU CHECKED OFF ANY PROBLEMS ON THIS QUESTIONNAIRE, HOW DIFFICULT HAVE THESE PROBLEMS MADE IT FOR YOU TO DO YOUR WORK, TAKE CARE OF THINGS AT HOME, OR GET ALONG WITH OTHER PEOPLE: SOMEWHAT DIFFICULT
GAD7 TOTAL SCORE: 13
3. WORRYING TOO MUCH ABOUT DIFFERENT THINGS: NEARLY EVERY DAY
GAD7 TOTAL SCORE: 13
2. NOT BEING ABLE TO STOP OR CONTROL WORRYING: NEARLY EVERY DAY
7. FEELING AFRAID AS IF SOMETHING AWFUL MIGHT HAPPEN: MORE THAN HALF THE DAYS
7. FEELING AFRAID AS IF SOMETHING AWFUL MIGHT HAPPEN: MORE THAN HALF THE DAYS

## 2022-11-14 NOTE — PROGRESS NOTES
Answers for HPI/ROS submitted by the patient on 2022  If you checked off any problems, how difficult have these problems made it for you to do your work, take care of things at home, or get along with other people?: Somewhat difficult  PHQ9 TOTAL SCORE: 7  ANNEMARIE 7 TOTAL SCORE: 13        [facility]                                     Progress Note    Patient Name: Beata Joseph  Date: 22         Service Type: Individual      Session Start Time: 3:00p  Session End Time: 3:45p     Session Length: 45    Session #: 4    Attendees: Client attended alone    Service Modality:  Video Visit:      Provider verified identity through the following two step process.  Patient provided:  Patient     Telemedicine Visit: The patient's condition can be safely assessed and treated via synchronous audio and visual telemedicine encounter.      Reason for Telemedicine Visit: Services only offered telehealth    Originating Site (Patient Location): Patient's home    Distant Site (Provider Location): Provider Remote Setting- Home Office    Consent:  The patient/guardian has verbally consented to: the potential risks and benefits of telemedicine (video visit) versus in person care; bill my insurance or make self-payment for services provided; and responsibility for payment of non-covered services.     Patient would like the video invitation sent by:  Send to e-mail at: [email or web address]    Mode of Communication:  Video Conference via Amwell    Distant Location (Provider):  Off-site    As the provider I attest to compliance with applicable laws and regulations related to telemedicine.    DATA  Interactive Complexity: No  Crisis: No        Progress Since Last Session (Related to Symptoms / Goals / Homework):   Symptoms: No change Mood at 7/10    Homework: Completed in session      Episode of Care Goals: Satisfactory progress - ACTION (Actively working towards change); Intervened by reinforcing change plan /  affirming steps taken     Current / Ongoing Stressors and Concerns:   Last session 10/28, pt went on a recent trip to [place] with her  which ended up being a negative experience. She noted some of the challenge being in a social atmosphere where others were drinking alcohol and partying and this not being something she is comfortable with.    Pt notes that work has slightly improved with less concern around her productivity along with changing her schedule slightly.      Treatment Objective(s) Addressed in This Session:   identify three distraction and diversion activities and use those activities to decrease level of anxiety         Intervention:   Motivational Interviewing    MI Intervention: Supported Autonomy, Collaboration, Evocation, Permission to raise concern or advise and Open-ended questions     Change Talk Expressed by the Patient: Reasons to change Need to change Committment to change    Provider Response to Change Talk: E - Evoked more info from patient about behavior change, A - Affirmed patient's thoughts, decisions, or attempts at behavior change and R - Reflected patient's change talk      Assessments completed prior to visit:  The following assessments were completed by patient for this visit:  PHQ9:   PHQ-9 SCORE 7/4/2022 7/27/2022 8/18/2022 8/23/2022 9/15/2022 11/14/2022 11/14/2022   PHQ-9 Total Score MyChart 7 (Mild depression) - 8 (Mild depression) 9 (Mild depression) 5 (Mild depression) - 7 (Mild depression)   PHQ-9 Total Score 7 3 8 9 5 7 7     GAD7:   ANNEMARIE-7 SCORE 10/26/2021 6/13/2022 7/4/2022 8/18/2022 9/15/2022 11/14/2022 11/14/2022   Total Score - - 8 (mild anxiety) 6 (mild anxiety) 8 (mild anxiety) - 13 (moderate anxiety)   Total Score 9 14 8 6 8 13 13         ASSESSMENT: Current Emotional / Mental Status (status of significant symptoms):   Risk status (Self / Other harm or suicidal ideation)   Patient denies current fears or concerns for personal safety.   Patient denies current  or recent suicidal ideation or behaviors.   Patient denies current or recent homicidal ideation or behaviors.   Patient denies current or recent self injurious behavior or ideation.   Patient denies other safety concerns.   Patient reports there has been no change in risk factors since their last session.     Patient reports there has been no change in protective factors since their last session.     Recommended that patient call 911 or go to the local ED should there be a change in any of these risk factors.     Appearance:   Appropriate    Eye Contact:   Good    Psychomotor Behavior: Normal    Attitude:   Cooperative    Orientation:   All   Speech    Rate / Production: Normal     Volume:  Normal    Mood:    Normal   Affect:    Appropriate    Thought Content:  Clear    Thought Form:  Coherent  Logical    Insight:    Good      Medication Review:   No changes to current psychiatric medication(s)     Medication Compliance:   Yes     Changes in Health Issues:   None reported     Chemical Use Review:   Substance Use: Chemical use reviewed, no active concerns identified      Tobacco Use: No current tobacco use.      Diagnosis:  ANNEMARIE    Collateral Reports Completed:   Not Applicable    PLAN: (Patient Tasks / Therapist Tasks / Other)  PT will engage in 1x daily healthy coping skills        Ruslan David, LMFT  11/14/22                                                         ______________________________________________________________________  Individual Treatment Plan     Patient's Name: Beata Joseph                      YOB: 1990     Date of Creation: 10/7/22  Date Treatment Plan Last Reviewed/Revised: 1/7/23     DSM5 Diagnoses: 300.02 (F41.1) Generalized Anxiety Disorder  Psychosocial / Contextual Factors:   PROMIS (reviewed every 90 days):      Referral / Collaboration:  Referral to another professional/service is not indicated at this time..     Anticipated number of session for this episode  of care: 6-9 sessions  Anticipation frequency of session: Monthly  Anticipated Duration of each session: 38-52 minutes  Treatment plan will be reviewed in 90 days or when goals have been changed.         MeasurableTreatment Goal(s) related to diagnosis / functional impairment(s)  Goal 1: Patient will engage in mchugh regulation exercise for anxiety reduction     Objective #A (Patient Action)                          Patient will use relaxation strategies 3 times per day to reduce the physical symptoms of anxiety.  Status: New - Date: 10/7/22      Intervention(s)  Therapist will teach emotional regulation skills. Body calming.        Patient has reviewed and agreed to the above plan.        Ruslan David, JENISE                       October 7, 2022

## 2022-11-15 ENCOUNTER — OFFICE VISIT (OUTPATIENT)
Dept: MATERNAL FETAL MEDICINE | Facility: CLINIC | Age: 32
End: 2022-11-15
Attending: ADVANCED PRACTICE MIDWIFE
Payer: COMMERCIAL

## 2022-11-15 ENCOUNTER — HOSPITAL ENCOUNTER (OUTPATIENT)
Dept: ULTRASOUND IMAGING | Facility: CLINIC | Age: 32
Discharge: HOME | End: 2022-11-15
Attending: ADVANCED PRACTICE MIDWIFE
Payer: COMMERCIAL

## 2022-11-15 DIAGNOSIS — O26.90 PREGNANCY RELATED CONDITION: ICD-10-CM

## 2022-11-15 DIAGNOSIS — O35.9XX0 SUSPECTED FETAL ANOMALY, ANTEPARTUM, SINGLE OR UNSPECIFIED FETUS: Primary | ICD-10-CM

## 2022-11-15 PROCEDURE — 76811 OB US DETAILED SNGL FETUS: CPT

## 2022-11-15 PROCEDURE — 76811 OB US DETAILED SNGL FETUS: CPT | Mod: 26 | Performed by: OBSTETRICS & GYNECOLOGY

## 2022-11-15 NOTE — PROGRESS NOTES
The patient was seen for an ultrasound in the [facility] at the [facility] today.  For a detailed report of the ultrasound examination, please see the ultrasound report which can be found under the imaging tab.    Anne Salgado MD  , OB/GYN  Maternal-Fetal Medicine  [phone] (Pager)

## 2022-11-22 ENCOUNTER — VIRTUAL VISIT (OUTPATIENT)
Dept: FAMILY MEDICINE | Facility: CLINIC | Age: 32
End: 2022-11-22
Attending: FAMILY MEDICINE
Payer: COMMERCIAL

## 2022-11-22 DIAGNOSIS — O26.812 PREGNANCY RELATED FATIGUE IN SECOND TRIMESTER: ICD-10-CM

## 2022-11-22 DIAGNOSIS — F41.9 ANXIETY: ICD-10-CM

## 2022-11-22 DIAGNOSIS — F33.0 MILD EPISODE OF RECURRENT MAJOR DEPRESSIVE DISORDER (H): Primary | ICD-10-CM

## 2022-11-22 PROCEDURE — 99214 OFFICE O/P EST MOD 30 MIN: CPT | Mod: 95 | Performed by: FAMILY MEDICINE

## 2022-11-22 PROCEDURE — 96127 BRIEF EMOTIONAL/BEHAV ASSMT: CPT | Mod: 95 | Performed by: FAMILY MEDICINE

## 2022-11-22 RX ORDER — BUPROPION HYDROCHLORIDE 75 MG/1
75 TABLET ORAL DAILY
Qty: 30 TABLET | Refills: 11 | Status: SHIPPED | OUTPATIENT
Start: 2022-11-22 | End: 2023-01-13 | Stop reason: DRUGHIGH

## 2022-11-22 RX ORDER — BUPROPION HYDROCHLORIDE 150 MG/1
150 TABLET ORAL EVERY MORNING
Qty: 30 TABLET | Refills: 11 | Status: SHIPPED | OUTPATIENT
Start: 2022-11-22 | End: 2023-04-14

## 2022-11-22 ASSESSMENT — PATIENT HEALTH QUESTIONNAIRE - PHQ9
10. IF YOU CHECKED OFF ANY PROBLEMS, HOW DIFFICULT HAVE THESE PROBLEMS MADE IT FOR YOU TO DO YOUR WORK, TAKE CARE OF THINGS AT HOME, OR GET ALONG WITH OTHER PEOPLE: SOMEWHAT DIFFICULT
SUM OF ALL RESPONSES TO PHQ QUESTIONS 1-9: 7
SUM OF ALL RESPONSES TO PHQ QUESTIONS 1-9: 7

## 2022-11-22 NOTE — PROGRESS NOTES
Beata is a 32 year old who is being evaluated via a billable video visit.      How would you like to obtain your AVS? MyChart  If the video visit is dropped, the invitation should be resent by: Send to e-mail at: [email or web address]  Will anyone else be joining your video visit? No          Assessment & Plan         Mild episode of recurrent major depressive disorder (H)Anxiety  PHQ 2022   PHQ-9 Total Score 7 7 7   Q9: Thoughts of better off dead/self-harm past 2 weeks Not at all Not at all Not at all   F/U: Thoughts of suicide or self-harm - - -   F/U: Self harm-plan - - -   F/U: Self-harm action - - -   F/U: Safety concerns - - -     ANNEMARIE-7 SCORE 9/15/2022 2022 2022   Total Score 8 (mild anxiety) - 13 (moderate anxiety)   Total Score 8 13 13   wellbutrin improved mood over all. Increase it by 75 mg. Continue counseling. Regular excercise and adequate sleep/hydration  Follow up in 2-4 weeks  Earlier if concerns with fatigue.  Medications implication on pregnancy discussed in detail     - buPROPion (WELLBUTRIN XL) 150 MG 24 hr tablet; Take 1 tablet (150 mg) by mouth every morning  Increase - buPROPion (WELLBUTRIN) 75 MG tablet; Take 1 tablet (75 mg) by mouth daily  Potential medication side effects were discussed with the patient; let me know if any occur.    Pregnancy related fatigue in second trimester  Under care of midwives .  Keep close follow up with midwives - CBC with platelets; Future  - TSH with free T4 reflex; Future        Serina Munguia MD  [facility]    Subjective   Beata is a 32 year [id number] Expected date of delivery 23  presenting for the following health issues:  No chief complaint on file.      History of Present Illness       Reason for visit:  Check in for depression/pregnancy    She eats 2-3 servings of fruits and vegetables daily.She consumes 1 sweetened beverage(s) daily.She exercises with enough effort to increase her  heart rate 30 to 60 minutes per day.  She exercises with enough effort to increase her heart rate 4 days per week.   She is taking medications regularly.    Today's PHQ-9         PHQ-9 Total Score: 7    PHQ-9 Q9 Thoughts of better off dead/self-harm past 2 weeks :   Not at all    How difficult have these problems made it for you to do your work, take care of things at home, or get along with other people: Somewhat difficult   23 weeks pregnant,Hanging in there  MFM- no fetal anomoly- was very tense with initial abnormal ultrasound now back to Routine prenatal care & vitamins.  Has been seen by [facility].  Understand the pregnancy implication on pregnancy   stressful work as always understaffed.  Routine therapy for anxiety and depression   Review of Systems   Constitutional, HEENT, cardiovascular, pulmonary, GI, , musculoskeletal, neuro, skin, endocrine and psych systems are negative, except as otherwise noted.      Objective           Vitals:  No vitals were obtained today due to virtual visit.    Physical Exam   GENERAL: Healthy, alert and no distress  EYES: Eyes grossly normal to inspection.  No discharge or erythema, or obvious scleral/conjunctival abnormalities.  RESP: No audible wheeze, cough, or visible cyanosis.  No visible retractions or increased work of breathing.    SKIN: Visible skin clear. No significant rash, abnormal pigmentation or lesions.  NEURO: Cranial nerves grossly intact.  Mentation and speech appropriate for age.  PSYCH: Mentation appears normal, affect normal/bright, judgement and insight intact, normal speech and appearance well-groomed.            Video-Visit Details    Video Start Time: 5:37 PM    Type of service:  Video Visit    Video End Time:5:48 PM    Originating Location (pt. Location): Home      Distant Location (provider location):  On-site    Platform used for Video Visit: Juan Manuel

## 2022-12-08 ASSESSMENT — ANXIETY QUESTIONNAIRES
7. FEELING AFRAID AS IF SOMETHING AWFUL MIGHT HAPPEN: SEVERAL DAYS
3. WORRYING TOO MUCH ABOUT DIFFERENT THINGS: MORE THAN HALF THE DAYS
GAD7 TOTAL SCORE: 8
8. IF YOU CHECKED OFF ANY PROBLEMS, HOW DIFFICULT HAVE THESE MADE IT FOR YOU TO DO YOUR WORK, TAKE CARE OF THINGS AT HOME, OR GET ALONG WITH OTHER PEOPLE?: SOMEWHAT DIFFICULT
1. FEELING NERVOUS, ANXIOUS, OR ON EDGE: MORE THAN HALF THE DAYS
2. NOT BEING ABLE TO STOP OR CONTROL WORRYING: SEVERAL DAYS
GAD7 TOTAL SCORE: 8
IF YOU CHECKED OFF ANY PROBLEMS ON THIS QUESTIONNAIRE, HOW DIFFICULT HAVE THESE PROBLEMS MADE IT FOR YOU TO DO YOUR WORK, TAKE CARE OF THINGS AT HOME, OR GET ALONG WITH OTHER PEOPLE: SOMEWHAT DIFFICULT
4. TROUBLE RELAXING: NOT AT ALL
6. BECOMING EASILY ANNOYED OR IRRITABLE: MORE THAN HALF THE DAYS
7. FEELING AFRAID AS IF SOMETHING AWFUL MIGHT HAPPEN: SEVERAL DAYS
GAD7 TOTAL SCORE: 8
5. BEING SO RESTLESS THAT IT IS HARD TO SIT STILL: NOT AT ALL

## 2022-12-08 ASSESSMENT — PATIENT HEALTH QUESTIONNAIRE - PHQ9
SUM OF ALL RESPONSES TO PHQ QUESTIONS 1-9: 8
SUM OF ALL RESPONSES TO PHQ QUESTIONS 1-9: 8
10. IF YOU CHECKED OFF ANY PROBLEMS, HOW DIFFICULT HAVE THESE PROBLEMS MADE IT FOR YOU TO DO YOUR WORK, TAKE CARE OF THINGS AT HOME, OR GET ALONG WITH OTHER PEOPLE: SOMEWHAT DIFFICULT

## 2022-12-08 NOTE — PROGRESS NOTES
25w3d    Feels okay, brings concerns of ongoing racing heart at work. 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 States that HR once got to 160 with a transfer and she was briefly, mildly short of breath. Denies CP, ongoing SOB, and symptoms at rest. Endorses that HR improved with rest. Symptoms only occur when at work. 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     Discussed normal physiologic cardiac changes of pregnancy due to increased blood volume. Provided reassurance that symptoms are likely related to normal pregnancy changes and work stress. Offered cardiac referral for patient reassurance given ongoing symptoms, declines today. Encouraged pt to seek care if chest pain, SOB, or racing heart at rest. Pt agreeable to plan, hopeful that change in work will improve symptoms.     Fetal movement: positive   Denies loss of fluid/vb/contractions  GCT next visit, handout provided, reminded of longer appointment    Tdap next visit; reviewed CDC recommendations and partner/family vaccination recommended as well    Need for Rhogam? No, to be done next visit   TDap next visit    Return to clinic 4 weeks    Bing Rivera, APRN [facility]  [phone]

## 2022-12-09 ENCOUNTER — VIRTUAL VISIT (OUTPATIENT)
Dept: PSYCHOLOGY | Facility: CLINIC | Age: 32
End: 2022-12-09
Payer: COMMERCIAL

## 2022-12-09 ENCOUNTER — PRENATAL OFFICE VISIT (OUTPATIENT)
Dept: MIDWIFE SERVICES | Facility: CLINIC | Age: 32
End: 2022-12-09
Payer: COMMERCIAL

## 2022-12-09 VITALS — SYSTOLIC BLOOD PRESSURE: 130 MMHG | BODY MASS INDEX: 27.17 KG/M2 | WEIGHT: 184 LBS | DIASTOLIC BLOOD PRESSURE: 86 MMHG

## 2022-12-09 DIAGNOSIS — F41.1 GAD (GENERALIZED ANXIETY DISORDER): Primary | ICD-10-CM

## 2022-12-09 DIAGNOSIS — O09.92 SUPERVISION OF HIGH RISK PREGNANCY IN SECOND TRIMESTER: Primary | ICD-10-CM

## 2022-12-09 DIAGNOSIS — F41.9 ANXIETY: ICD-10-CM

## 2022-12-09 DIAGNOSIS — F33.0 MILD EPISODE OF RECURRENT MAJOR DEPRESSIVE DISORDER (H): ICD-10-CM

## 2022-12-09 PROCEDURE — 90834 PSYTX W PT 45 MINUTES: CPT | Mod: 95 | Performed by: MARRIAGE & FAMILY THERAPIST

## 2022-12-09 PROCEDURE — 99207 PR PRENATAL VISIT: CPT

## 2022-12-09 NOTE — PATIENT INSTRUCTIONS
GESTATIONAL DIABETES SCREENING    All pregnant women are screened at least once for diabetes as part of their prenatal care. A woman has gestational diabetes if she has high blood sugars for the first time during pregnancy.    Diabetes can harm your health and the health of your baby.  But if we find the diabetes early in pregnancy we will watch your health closely and prevent further problems.     We will check for gestational diabetes during your visit between 24-28 weeks visit. Please note you can not do this prior to 24 weeks of gestation.    Plan to spend about an hour at the clinic.  When you check in let us know that you will be having your diabetes screening that day.     We will give you a 50 gram glucose drink that you have 5 minutes to consume.  Exactly one hour later you will have draw blood from your arm to check your blood sugar level.    We will call you to let you know if your results are normal.  If the results are normal no more testing will be needed.  If your results are not normal we will discuss follow up testing with you.      You may eat prior to the testing but it is not recommended to eat or drink very sweet things such as pop, juice, candy or dessert type foods.  Eat a high protein, low carb meals prior to testing.    If you have any questions please call:    [facility]    [phone]

## 2022-12-09 NOTE — PROGRESS NOTES
Answers for HPI/ROS submitted by the patient on 2022  If you checked off any problems, how difficult have these problems made it for you to do your work, take care of things at home, or get along with other people?: Somewhat difficult  PHQ9 TOTAL SCORE: 8  ANNEMARIE 7 TOTAL SCORE: 8        [facility]                                     Progress Note    Patient Name: Beata Joseph  Date: 22         Service Type: Individual      Session Start Time: 10:04a  Session End Time: 10:44a     Session Length: 40    Session #: 5    Attendees: Client attended alone    Service Modality:  Video Visit:      Provider verified identity through the following two step process.  Patient provided:  Patient     Telemedicine Visit: The patient's condition can be safely assessed and treated via synchronous audio and visual telemedicine encounter.      Reason for Telemedicine Visit: Services only offered telehealth    Originating Site (Patient Location): Patient's home    Distant Site (Provider Location): Provider Remote Setting- Home Office    Consent:  The patient/guardian has verbally consented to: the potential risks and benefits of telemedicine (video visit) versus in person care; bill my insurance or make self-payment for services provided; and responsibility for payment of non-covered services.     Patient would like the video invitation sent by:  Send to e-mail at: [email or web address]    Mode of Communication:  Video Conference via [employer]    Distant Location (Provider):  Off-site    As the provider I attest to compliance with applicable laws and regulations related to telemedicine.    DATA  Interactive Complexity: No  Crisis: No        Progress Since Last Session (Related to Symptoms / Goals / Homework):   Symptoms: No change Mood at 6/10    Homework: Completed in session      Episode of Care Goals: Satisfactory progress - ACTION (Actively working towards change); Intervened by reinforcing change plan /  affirming steps taken     Current / Ongoing Stressors and Concerns:   Last session 11/14, pt notes the past 4 weeks have been ok and the baby is growing well. Work continues to be a challenge and pt notes it being the main source of exhaustion while there. She notes feeling more anxious while working which will lead to physical sx and then by the end of the day she is exhausted.      Treatment Objective(s) Addressed in This Session:   identify three distraction and diversion activities and use those activities to decrease level of anxiety         Intervention:   Motivational Interviewing    MI Intervention: Supported Autonomy, Collaboration, Evocation, Permission to raise concern or advise and Open-ended questions     Change Talk Expressed by the Patient: Reasons to change Need to change Committment to change    Provider Response to Change Talk: E - Evoked more info from patient about behavior change and A - Affirmed patient's thoughts, decisions, or attempts at behavior change      Assessments completed prior to visit:  The following assessments were completed by patient for this visit:  PHQ9:   PHQ-9 SCORE 8/18/2022 8/23/2022 9/15/2022 11/14/2022 11/14/2022 11/22/2022 12/8/2022   PHQ-9 Total Score MyChart 8 (Mild depression) 9 (Mild depression) 5 (Mild depression) - 7 (Mild depression) 7 (Mild depression) 8 (Mild depression)   PHQ-9 Total Score 8 9 5 7 7 7 8     GAD7:   ANNEMARIE-7 SCORE 6/13/2022 7/4/2022 8/18/2022 9/15/2022 11/14/2022 11/14/2022 12/8/2022   Total Score - 8 (mild anxiety) 6 (mild anxiety) 8 (mild anxiety) - 13 (moderate anxiety) 8 (mild anxiety)   Total Score 14 8 6 8 13 13 8         ASSESSMENT: Current Emotional / Mental Status (status of significant symptoms):   Risk status (Self / Other harm or suicidal ideation)   Patient denies current fears or concerns for personal safety.   Patient denies current or recent suicidal ideation or behaviors.   Patient denies current or recent homicidal ideation or  behaviors.   Patient denies current or recent self injurious behavior or ideation.   Patient denies other safety concerns.   Patient reports there has been no change in risk factors since their last session.     Patient reports there has been no change in protective factors since their last session.     Recommended that patient call 911 or go to the local ED should there be a change in any of these risk factors.     Appearance:   Appropriate    Eye Contact:   Good    Psychomotor Behavior: Normal    Attitude:   Cooperative    Orientation:   All   Speech    Rate / Production: Normal     Volume:  Normal    Mood:    Normal   Affect:    Appropriate    Thought Content:  Clear    Thought Form:  Coherent  Logical    Insight:    Good      Medication Review:   No changes to current psychiatric medication(s)     Medication Compliance:   Yes     Changes in Health Issues:   None reported     Chemical Use Review:   Substance Use: Chemical use reviewed, no active concerns identified      Tobacco Use: No current tobacco use.      Diagnosis:  ANNEMARIE    Collateral Reports Completed:   Not Applicable    PLAN: (Patient Tasks / Therapist Tasks / Other)  PT will engage in 1x daily anxiety reduction techniques        Ruslan David, LMFT  12/9/22                                                         ______________________________________________________________________  Individual Treatment Plan     Patient's Name: Beata Joseph                      YOB: 1990     Date of Creation: 10/7/22  Date Treatment Plan Last Reviewed/Revised: 1/7/23     DSM5 Diagnoses: 300.02 (F41.1) Generalized Anxiety Disorder  Psychosocial / Contextual Factors:   PROMIS (reviewed every 90 days):      Referral / Collaboration:  Referral to another professional/service is not indicated at this time..     Anticipated number of session for this episode of care: 6-9 sessions  Anticipation frequency of session: Monthly  Anticipated Duration of  each session: 38-52 minutes  Treatment plan will be reviewed in 90 days or when goals have been changed.         MeasurableTreatment Goal(s) related to diagnosis / functional impairment(s)  Goal 1: Patient will engage in mchugh regulation exercise for anxiety reduction     Objective #A (Patient Action)                          Patient will use relaxation strategies 3 times per day to reduce the physical symptoms of anxiety.  Status: New - Date: 10/7/22      Intervention(s)  Therapist will teach emotional regulation skills. Body calming.        Patient has reviewed and agreed to the above plan.        Ruslan David, JENISE                       October 7, 2022

## 2022-12-19 DIAGNOSIS — Z23 NEED FOR TDAP VACCINATION: ICD-10-CM

## 2022-12-19 DIAGNOSIS — Z36.9 ENCOUNTER FOR ANTENATAL SCREENING OF MOTHER: Primary | ICD-10-CM

## 2022-12-28 NOTE — PROGRESS NOTES
28w4d  Feels well. Here with Lewis.   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 Report heart rate is above 110 when at work, and then to 80s when back home.   Last week has been better, though. Energy level coming home from work has been better, too.   Occasionally feels a heart flutter for a few seconds. Doesnot feel the fluttering is happening more often or is becoming more prolonged.   Cardiology referral has already been offered in which she declines still. Will continue to monitor, if significant changes will offer/recommend Cards referral again  Fetal movement: positive, denies loss of fluid/vb/contractions  GCT, CBC drawn today  Tdap given: Yes  Covid Vaccine: Wants to wait to get booster until after PP  Flu Vaccine:9/28  Rhogam: No A+  Anti Treponema drawn: Yes  Water birth information reviewed: Not interested    Reviewed PTL precautions and S&S of PIH, patient verbalizes understanding and what to report  Hospital Registration reminder-online    Return to clinic 2 weeks    Ruma DHILLON CNM

## 2022-12-28 NOTE — PATIENT INSTRUCTIONS
"PREECLAMPSIA SIGNS AND SYMPTOMS    Preeclampsia is a dangerous condition that some women develop in the second half of pregnancy. It can also begin after the baby is born.  Preeclampsia causes high blood pressure and can cause problems with many organ systems in your body.  It can also affect the growth of your baby. The exact cause of preeclampsia is unknown, however, there are signs and symptoms to watch for:    -A bad headache that doesn't improve with Tylenol  -Visual changes such as spots, flashes of light, blurry vision  -Pain in the upper right part of your abdomen, especially under the ribs that doesn't go away  -Nausea and/or vomiting  -Feeling extremely tired  -Yellowing of the skin and/or eyes  -Feeling \"not quite right\" or that something is wrong  -An extreme amount of swelling (some swelling in pregnancy is very normal)    If your midwife feels that you are developing preeclampsia, you will have lab tests drawn and will be monitored very closely.     If you are experiencing anyof these symptoms, call the [facility] immediately at [phone].    Fetal Kick Counts    It is important to know when your baby's movements occur. We often get busy with work and life and do not pay close attention to their movements.      Women typically begin feeling movement between 18-22 weeks of gestation, sometimes it can be earlier or later depending on where your placenta is     Movements usually begin feeling like popping or fluttering and as the baby grows they become more pronounced    Toward the end of pregnancy as the baby gets larger they may not move as much or make as big of movements. Babies have maturing sleep cycles as well as not as much room to move and flip. If you are ever concerned about your baby's movements or have not felt the baby move for a while, we recommend you do a fetal kick count. Prior to starting your count drink a glass of water or juice and eat a snack. Then lay down on your " side and begin to count movements.       SIGNS OF  LABOR    Labor is  if it happens more than three weeks before your due date.    It can be hard to know if you are in labor, since the symptoms can be like the normal feelings of pregnancy.  Often, the only difference is the symptoms increase or they don't go away.     Signs of  labor can include:    Contractions which can feel like period cramps or gas pain.  You may feel it in the lower part of your abdomen, in your back, or as a pressure feeling in your bottom.  It is often regular, coming every 5 or 10 minutes, and  lasting about 30-60 seconds. Some contractions are normal during pregnancy (Kwasi dupree contractions) but if you are feeling more than 5-6 in one hour that is NOT normal  If this occurs empty your bladder, then drink 2-3 glasses of water, eat a snack, and lay down on your left side. Put your hand on your abdomen to count the contractions.  If after one hour of resting you have still had 5-6 contractions call your clinic right away.    If you feel a pop, gush, or trickle of fluid it may mean that your bag of water has broken and you should contact the clinic     You may also experience loose stools and/or rectal pressure     Listen to your body, if something doesn't seem right please call us at the clinic    Risk Factors    Previous  delivery  Bacterial Vaginosis- if you notice a fishy smell to your discharge or experience vaginal itching/discomfort you should be evaluated for infection  Smoking  Drug abuse  Adolescent (teen) pregnancy or advanced maternal age (AMA) age 35 and over  Dehydration (this may not cause  labor but it can cause contractions)    If you think you are in  labor we may do some lab testing in the clinic or send you to the hospital for evaluation    Please call us if you are concerned you are in  labor.    [facility]  [phone]

## 2022-12-30 ENCOUNTER — PRENATAL OFFICE VISIT (OUTPATIENT)
Dept: MIDWIFE SERVICES | Facility: CLINIC | Age: 32
End: 2022-12-30
Payer: COMMERCIAL

## 2022-12-30 ENCOUNTER — LAB (OUTPATIENT)
Dept: LAB | Facility: CLINIC | Age: 32
End: 2022-12-30
Payer: COMMERCIAL

## 2022-12-30 VITALS — BODY MASS INDEX: 27.85 KG/M2 | WEIGHT: 188.6 LBS | SYSTOLIC BLOOD PRESSURE: 126 MMHG | DIASTOLIC BLOOD PRESSURE: 84 MMHG

## 2022-12-30 DIAGNOSIS — Z3A.28 28 WEEKS GESTATION OF PREGNANCY: ICD-10-CM

## 2022-12-30 DIAGNOSIS — O09.92 SUPERVISION OF HIGH RISK PREGNANCY IN SECOND TRIMESTER: ICD-10-CM

## 2022-12-30 DIAGNOSIS — Z23 NEED FOR TDAP VACCINATION: ICD-10-CM

## 2022-12-30 DIAGNOSIS — Z36.9 ENCOUNTER FOR ANTENATAL SCREENING OF MOTHER: ICD-10-CM

## 2022-12-30 DIAGNOSIS — O09.92 SUPERVISION OF HIGH RISK PREGNANCY IN SECOND TRIMESTER: Primary | ICD-10-CM

## 2022-12-30 LAB
ERYTHROCYTE [DISTWIDTH] IN BLOOD BY AUTOMATED COUNT: 12.2 % (ref 10–15)
GLUCOSE 1H P 50 G GLC PO SERPL-MCNC: 125 MG/DL (ref 70–129)
HCT VFR BLD AUTO: 36.5 % (ref 35–47)
HGB BLD-MCNC: 11.9 G/DL (ref 11.7–15.7)
MCH RBC QN AUTO: 29.3 PG (ref 26.5–33)
MCHC RBC AUTO-ENTMCNC: 32.6 G/DL (ref 31.5–36.5)
MCV RBC AUTO: 90 FL (ref 78–100)
PLATELET # BLD AUTO: 243 10E3/UL (ref 150–450)
RBC # BLD AUTO: 4.06 10E6/UL (ref 3.8–5.2)
WBC # BLD AUTO: 14.4 10E3/UL (ref 4–11)

## 2022-12-30 PROCEDURE — 86780 TREPONEMA PALLIDUM: CPT

## 2022-12-30 PROCEDURE — 90715 TDAP VACCINE 7 YRS/> IM: CPT | Performed by: ADVANCED PRACTICE MIDWIFE

## 2022-12-30 PROCEDURE — 59425 ANTEPARTUM CARE ONLY: CPT | Performed by: ADVANCED PRACTICE MIDWIFE

## 2022-12-30 PROCEDURE — 99207 PR PRENATAL VISIT: CPT | Performed by: ADVANCED PRACTICE MIDWIFE

## 2022-12-30 PROCEDURE — 36415 COLL VENOUS BLD VENIPUNCTURE: CPT

## 2022-12-30 PROCEDURE — 90471 IMMUNIZATION ADMIN: CPT | Performed by: ADVANCED PRACTICE MIDWIFE

## 2022-12-30 PROCEDURE — 82950 GLUCOSE TEST: CPT

## 2022-12-30 PROCEDURE — 85027 COMPLETE CBC AUTOMATED: CPT

## 2022-12-31 LAB — T PALLIDUM AB SER QL: NONREACTIVE

## 2023-01-10 NOTE — PROGRESS NOTES
Originating Location (pt. Location): Home       Distant Location (provider location):  On-site       Mode of Communication: Telephone visit     30w4d  Feels a lot better overall. Mon-Wed was bad, but has turned a corner. Phone visit due to recent COVID positive.   Fetal Movement: positive, denies loss of fluid/vb, no contractions  Fetal kick counts reviewed and handout given  Reviewed PTL precautions and s/sx of preeclampsia; denies any S&S and aware of what to report    Growth U/S ordered.    Return to clinic in 2 weeks for growth ultrasound and prenatal appt.     Berta Sykes, DNP, APRN, CNM

## 2023-01-13 ENCOUNTER — VIRTUAL VISIT (OUTPATIENT)
Dept: MIDWIFE SERVICES | Facility: CLINIC | Age: 33
End: 2023-01-13
Payer: COMMERCIAL

## 2023-01-13 DIAGNOSIS — Z3A.30 30 WEEKS GESTATION OF PREGNANCY: ICD-10-CM

## 2023-01-13 DIAGNOSIS — O98.513 COVID-19 AFFECTING PREGNANCY IN THIRD TRIMESTER: Primary | ICD-10-CM

## 2023-01-13 DIAGNOSIS — O09.92 SUPERVISION OF HIGH RISK PREGNANCY IN SECOND TRIMESTER: ICD-10-CM

## 2023-01-13 DIAGNOSIS — U07.1 COVID-19 AFFECTING PREGNANCY IN THIRD TRIMESTER: Primary | ICD-10-CM

## 2023-01-13 PROCEDURE — 99207 PR PRENATAL VISIT: CPT | Performed by: ADVANCED PRACTICE MIDWIFE

## 2023-01-13 ASSESSMENT — PATIENT HEALTH QUESTIONNAIRE - PHQ9: SUM OF ALL RESPONSES TO PHQ QUESTIONS 1-9: 10

## 2023-01-13 NOTE — PATIENT INSTRUCTIONS
SIGNS OF  LABOR    Labor is  if it happens more than three weeks before your due date.    It can be hard to know if you are in labor, since the symptoms can be like the normal feelings of pregnancy.  Often, the only difference is the symptoms increase or they don't go away.     Signs of  labor can include:    Contractions which can feel like period cramps or gas pain.  You may feel it in the lower part of your abdomen, in your back, or as a pressure feeling in your bottom.  It is often regular, coming every 5 or 10 minutes, and  lasting about 30-60 seconds. Some contractions are normal during pregnancy (Martin dupree contractions) but if you are feeling more than 5-6 in one hour that is NOT normal  If this occurs empty your bladder, then drink 2-3 glasses of water, eat a snack, and lay down on your left side. Put your hand on your abdomen to count the contractions.  If after one hour of resting you have still had 5-6 contractions call your clinic right away.    If you feel a pop, gush, or trickle of fluid it may mean that your bag of water has broken and you should contact the clinic     You may also experience loose stools and/or rectal pressure     Listen to your body, if something doesn't seem right please call us at the clinic    Risk Factors    Previous  delivery  Bacterial Vaginosis- if you notice a fishy smell to your discharge or experience vaginal itching/discomfort you should be evaluated for infection  Smoking  Drug abuse  Adolescent (teen) pregnancy or advanced maternal age (AMA) age 35 and over  Dehydration (this may not cause  labor but it can cause contractions)    If you think you are in  labor we may do some lab testing in the clinic or send you to the hospital for evaluation    Please call us if you are concerned you are in  labor.    [facility]  [phone]    PREECLAMPSIA SIGNS AND SYMPTOMS    Preeclampsia is a dangerous condition  "that some women develop in the second half of pregnancy. It can also begin after the baby is born.  Preeclampsia causes high blood pressure and can cause problems with many organ systems in your body.  It can also affect the growth of your baby. The exact cause of preeclampsia is unknown, however, there are signs and symptoms to watch for:    -A bad headache that doesn't improve with Tylenol  -Visual changes such as spots, flashes of light, blurry vision  -Pain in the upper right part of your abdomen, especially under the ribs that doesn't go away  -Nausea and/or vomiting  -Feeling extremely tired  -Yellowing of the skin and/or eyes  -Feeling \"not quite right\" or that something is wrong  -An extreme amount of swelling (some swelling in pregnancy is very normal)    If your midwife feels that you are developing preeclampsia, you will have lab tests drawn and will be monitored very closely.     If you are experiencing anyof these symptoms, call the [facility] immediately at [phone].    Fetal Kick Counts    It is important to know when your baby's movements occur. We often get busy with work and life and do not pay close attention to their movements.      Women typically begin feeling movement between 18-22 weeks of gestation, sometimes it can be earlier or later depending on where your placenta is     Movements usually begin feeling like popping or fluttering and as the baby grows they become more pronounced    Toward the end of pregnancy as the baby gets larger they may not move as much or make as big of movements. Babies have maturing sleep cycles as well as not as much room to move and flip. If you are ever concerned about your baby's movements or have not felt the baby move for a while, we recommend you do a fetal kick count. Prior to starting your count drink a glass of water or juice and eat a snack. Then lay down on your side and begin to count movements.     How to do a Fetal Kick " Counts    There are many different ways to monitor your baby's movements. Movements can range from large jabs to small kicks, or wiggles.  Hiccups count!    Count 10 movements in 2 hours when resting and focusing  Count 10 movements in 12 hours when doing normal activity    We recommend that if movements occur but seem decreased that you should be seen in the clinic or hospital for evaluation within 12 hours. If fetal movement is absent or fetal kick counts are low please contact us right away.    If you ever have any concerns about your baby's movements DO NOT HESITATE to call us, we are here for you!    [facility]  [phone]

## 2023-01-18 ENCOUNTER — MYC MEDICAL ADVICE (OUTPATIENT)
Dept: FAMILY MEDICINE | Facility: CLINIC | Age: 33
End: 2023-01-18
Payer: COMMERCIAL

## 2023-01-27 ENCOUNTER — ANCILLARY PROCEDURE (OUTPATIENT)
Dept: ULTRASOUND IMAGING | Facility: CLINIC | Age: 33
End: 2023-01-27
Payer: COMMERCIAL

## 2023-01-27 ENCOUNTER — PRENATAL OFFICE VISIT (OUTPATIENT)
Dept: MIDWIFE SERVICES | Facility: CLINIC | Age: 33
End: 2023-01-27
Payer: COMMERCIAL

## 2023-01-27 VITALS — SYSTOLIC BLOOD PRESSURE: 122 MMHG | BODY MASS INDEX: 28.5 KG/M2 | WEIGHT: 193 LBS | DIASTOLIC BLOOD PRESSURE: 72 MMHG

## 2023-01-27 DIAGNOSIS — U07.1 COVID-19 AFFECTING PREGNANCY IN THIRD TRIMESTER: ICD-10-CM

## 2023-01-27 DIAGNOSIS — O09.92 SUPERVISION OF HIGH RISK PREGNANCY IN SECOND TRIMESTER: ICD-10-CM

## 2023-01-27 DIAGNOSIS — Z3A.32 32 WEEKS GESTATION OF PREGNANCY: ICD-10-CM

## 2023-01-27 DIAGNOSIS — O09.92 SUPERVISION OF HIGH RISK PREGNANCY IN SECOND TRIMESTER: Primary | ICD-10-CM

## 2023-01-27 DIAGNOSIS — O98.513 COVID-19 AFFECTING PREGNANCY IN THIRD TRIMESTER: ICD-10-CM

## 2023-01-27 PROCEDURE — 76816 OB US FOLLOW-UP PER FETUS: CPT | Performed by: OBSTETRICS & GYNECOLOGY

## 2023-01-27 PROCEDURE — 99207 PR PRENATAL VISIT: CPT | Performed by: ADVANCED PRACTICE MIDWIFE

## 2023-01-27 NOTE — PATIENT INSTRUCTIONS
"PREECLAMPSIA SIGNS AND SYMPTOMS    Preeclampsia is a dangerous condition that some women develop in the second half of pregnancy. It can also begin after the baby is born.  Preeclampsia causes high blood pressure and can cause problems with many organ systems in your body.  It can also affect the growth of your baby. The exact cause of preeclampsia is unknown, however, there are signs and symptoms to watch for:    -A bad headache that doesn't improve with Tylenol  -Visual changes such as spots, flashes of light, blurry vision  -Pain in the upper right part of your abdomen, especially under the ribs that doesn't go away  -Nausea and/or vomiting  -Feeling extremely tired  -Yellowing of the skin and/or eyes  -Feeling \"not quite right\" or that something is wrong  -An extreme amount of swelling (some swelling in pregnancy is very normal)    If your midwife feels that you are developing preeclampsia, you will have lab tests drawn and will be monitored very closely.     If you are experiencing anyof these symptoms, call the [facility] immediately at [phone].    Fetal Kick Counts    It is important to know when your baby's movements occur. We often get busy with work and life and do not pay close attention to their movements.      Women typically begin feeling movement between 18-22 weeks of gestation, sometimes it can be earlier or later depending on where your placenta is     Movements usually begin feeling like popping or fluttering and as the baby grows they become more pronounced    Toward the end of pregnancy as the baby gets larger they may not move as much or make as big of movements. Babies have maturing sleep cycles as well as not as much room to move and flip. If you are ever concerned about your baby's movements or have not felt the baby move for a while, we recommend you do a fetal kick count. Prior to starting your count drink a glass of water or juice and eat a snack. Then lay down on your " side and begin to count movements.       SIGNS OF  LABOR    Labor is  if it happens more than three weeks before your due date.    It can be hard to know if you are in labor, since the symptoms can be like the normal feelings of pregnancy.  Often, the only difference is the symptoms increase or they don't go away.     Signs of  labor can include:    Contractions which can feel like period cramps or gas pain.  You may feel it in the lower part of your abdomen, in your back, or as a pressure feeling in your bottom.  It is often regular, coming every 5 or 10 minutes, and  lasting about 30-60 seconds. Some contractions are normal during pregnancy (Kwasi dupree contractions) but if you are feeling more than 5-6 in one hour that is NOT normal  If this occurs empty your bladder, then drink 2-3 glasses of water, eat a snack, and lay down on your left side. Put your hand on your abdomen to count the contractions.  If after one hour of resting you have still had 5-6 contractions call your clinic right away.    If you feel a pop, gush, or trickle of fluid it may mean that your bag of water has broken and you should contact the clinic     You may also experience loose stools and/or rectal pressure     Listen to your body, if something doesn't seem right please call us at the clinic    Risk Factors    Previous  delivery  Bacterial Vaginosis- if you notice a fishy smell to your discharge or experience vaginal itching/discomfort you should be evaluated for infection  Smoking  Drug abuse  Adolescent (teen) pregnancy or advanced maternal age (AMA) age 35 and over  Dehydration (this may not cause  labor but it can cause contractions)    If you think you are in  labor we may do some lab testing in the clinic or send you to the hospital for evaluation    Please call us if you are concerned you are in  labor.    [facility]  [phone]

## 2023-01-27 NOTE — LETTER
January 27, 2023      Beata Joseph  [address] [place] [id number]        To Whom It May Concern:    Beata Joseph, spouse of Lewis Joseph is a patient of [facility]. Her estimated due date is 3/20/2023.      Sincerely,        ALEJO Espinoza CNM

## 2023-01-27 NOTE — PROGRESS NOTES
32w4d  Growth US today (hx of Covid 1/9/23)  Feels fully recovered from Covid  Fetal Movement: positive, denies loss of fluid/vb, no contractions  Fetal kick counts/movement reviewed  Reviewed PTL precautions and s/sx of preeclampsia; denies any S&S and aware of what to report     Peds chosen: Not yet  Pediatrician: Hep B, Vit K, Erythromycin and circumcision; advised to check insurance for hospital vs clinic    Plans to breastfeed Yes  Breastfeeding class planned Yes     Return to clinic 2 weeks    Ruma DHILLON CNM

## 2023-02-10 ENCOUNTER — PRENATAL OFFICE VISIT (OUTPATIENT)
Dept: MIDWIFE SERVICES | Facility: CLINIC | Age: 33
End: 2023-02-10
Payer: COMMERCIAL

## 2023-02-10 VITALS — SYSTOLIC BLOOD PRESSURE: 140 MMHG | BODY MASS INDEX: 29.12 KG/M2 | WEIGHT: 197.2 LBS | DIASTOLIC BLOOD PRESSURE: 92 MMHG

## 2023-02-10 DIAGNOSIS — Z3A.34 34 WEEKS GESTATION OF PREGNANCY: ICD-10-CM

## 2023-02-10 DIAGNOSIS — U07.1 COVID-19 AFFECTING PREGNANCY IN THIRD TRIMESTER: ICD-10-CM

## 2023-02-10 DIAGNOSIS — R03.0 ELEVATED BP WITHOUT DIAGNOSIS OF HYPERTENSION: ICD-10-CM

## 2023-02-10 DIAGNOSIS — O09.93 SUPERVISION OF HIGH RISK PREGNANCY IN THIRD TRIMESTER: Primary | ICD-10-CM

## 2023-02-10 DIAGNOSIS — O98.513 COVID-19 AFFECTING PREGNANCY IN THIRD TRIMESTER: ICD-10-CM

## 2023-02-10 LAB
ALBUMIN MFR UR ELPH: 6.7 MG/DL (ref 1–14)
ALT SERPL W P-5'-P-CCNC: 14 U/L (ref 10–35)
AST SERPL W P-5'-P-CCNC: 22 U/L (ref 10–35)
BASOPHILS # BLD AUTO: 0 10E3/UL (ref 0–0.2)
BASOPHILS NFR BLD AUTO: 0 %
BUN SERPL-MCNC: 6.1 MG/DL (ref 6–20)
CREAT SERPL-MCNC: 0.58 MG/DL (ref 0.51–0.95)
CREAT UR-MCNC: 49.7 MG/DL
EOSINOPHIL # BLD AUTO: 0.1 10E3/UL (ref 0–0.7)
EOSINOPHIL NFR BLD AUTO: 1 %
ERYTHROCYTE [DISTWIDTH] IN BLOOD BY AUTOMATED COUNT: 12.9 % (ref 10–15)
GFR SERPL CREATININE-BSD FRML MDRD: >90 ML/MIN/1.73M2
HCT VFR BLD AUTO: 35.6 % (ref 35–47)
HGB BLD-MCNC: 11.5 G/DL (ref 11.7–15.7)
LYMPHOCYTES # BLD AUTO: 2.3 10E3/UL (ref 0.8–5.3)
LYMPHOCYTES NFR BLD AUTO: 18 %
MCH RBC QN AUTO: 27.5 PG (ref 26.5–33)
MCHC RBC AUTO-ENTMCNC: 32.3 G/DL (ref 31.5–36.5)
MCV RBC AUTO: 85 FL (ref 78–100)
MONOCYTES # BLD AUTO: 0.8 10E3/UL (ref 0–1.3)
MONOCYTES NFR BLD AUTO: 6 %
NEUTROPHILS # BLD AUTO: 9.8 10E3/UL (ref 1.6–8.3)
NEUTROPHILS NFR BLD AUTO: 75 %
PLATELET # BLD AUTO: 244 10E3/UL (ref 150–450)
PROT/CREAT 24H UR: 0.13 MG/MG CR (ref 0–0.2)
RBC # BLD AUTO: 4.18 10E6/UL (ref 3.8–5.2)
WBC # BLD AUTO: 13 10E3/UL (ref 4–11)

## 2023-02-10 PROCEDURE — 82565 ASSAY OF CREATININE: CPT | Performed by: NURSE PRACTITIONER

## 2023-02-10 PROCEDURE — 84156 ASSAY OF PROTEIN URINE: CPT | Performed by: NURSE PRACTITIONER

## 2023-02-10 PROCEDURE — 85025 COMPLETE CBC W/AUTO DIFF WBC: CPT | Performed by: NURSE PRACTITIONER

## 2023-02-10 PROCEDURE — 99207 PR PRENATAL VISIT: CPT | Performed by: NURSE PRACTITIONER

## 2023-02-10 PROCEDURE — 36415 COLL VENOUS BLD VENIPUNCTURE: CPT | Performed by: NURSE PRACTITIONER

## 2023-02-10 PROCEDURE — 84460 ALANINE AMINO (ALT) (SGPT): CPT | Performed by: NURSE PRACTITIONER

## 2023-02-10 PROCEDURE — 84450 TRANSFERASE (AST) (SGOT): CPT | Performed by: NURSE PRACTITIONER

## 2023-02-10 PROCEDURE — 84520 ASSAY OF UREA NITROGEN: CPT | Performed by: NURSE PRACTITIONER

## 2023-02-10 NOTE — PATIENT INSTRUCTIONS

## 2023-02-10 NOTE — PROGRESS NOTES
34w4d  Feels okay, has had swelling in hands and face and is a little nervous about preeclampsia   Denies sx, headaches RUP pain, vision changes. BP elevated, rechecked at 140/92. PreE labs drawn, discussed BP follow up and potential need for early IOL.    Some dry irritated spots on lips that started around Covid.    Fetal Movement: positive, denies loss of fluid/vb, no contractions  Fetal kick counts/movement reviewed    Reviewed PTL precautions and s/sx of preeclampsia; denies any S&S and aware of what to report     Depression screening done yes  Have car seat -  Yes  Discussed GBS/cx check at next visit    BP check and NST to be done this weekend, followup OB visit with NST next week. Next prenatal visit in 1 wk  Knows to call if severe BP or preE symptoms arise.  Plan for weekly visits, labs and  surveillance if dx of GHTN or preeclampsia      [facility] for [facility]     Provider Disclosure:  I was present with the student who participated in the service and in the documentation of the note. I have verified the history and personally performed the physical exam and medical decision-making. I agree with the assessment and plan of care as documented in the note.     ALEJO Acevedo, [facility]  [phone]

## 2023-02-12 ENCOUNTER — APPOINTMENT (OUTPATIENT)
Dept: ULTRASOUND IMAGING | Facility: CLINIC | Age: 33
End: 2023-02-12
Attending: ADVANCED PRACTICE MIDWIFE
Payer: COMMERCIAL

## 2023-02-12 ENCOUNTER — HOSPITAL ENCOUNTER (OUTPATIENT)
Facility: CLINIC | Age: 33
Discharge: HOME | End: 2023-02-12
Attending: ADVANCED PRACTICE MIDWIFE | Admitting: ADVANCED PRACTICE MIDWIFE
Payer: COMMERCIAL

## 2023-02-12 ENCOUNTER — NURSE TRIAGE (OUTPATIENT)
Dept: NURSING | Facility: CLINIC | Age: 33
End: 2023-02-12

## 2023-02-12 VITALS
BODY MASS INDEX: 29.03 KG/M2 | HEART RATE: 125 BPM | RESPIRATION RATE: 16 BRPM | SYSTOLIC BLOOD PRESSURE: 139 MMHG | TEMPERATURE: 98.1 F | HEIGHT: 69 IN | DIASTOLIC BLOOD PRESSURE: 85 MMHG | WEIGHT: 196 LBS

## 2023-02-12 DIAGNOSIS — O13.3 GESTATIONAL HTN, THIRD TRIMESTER: Primary | ICD-10-CM

## 2023-02-12 PROCEDURE — G0463 HOSPITAL OUTPT CLINIC VISIT: HCPCS | Mod: 25

## 2023-02-12 PROCEDURE — 59025 FETAL NON-STRESS TEST: CPT | Mod: 59

## 2023-02-12 PROCEDURE — 99214 OFFICE O/P EST MOD 30 MIN: CPT | Mod: 25 | Performed by: ADVANCED PRACTICE MIDWIFE

## 2023-02-12 PROCEDURE — 76815 OB US LIMITED FETUS(S): CPT

## 2023-02-12 PROCEDURE — 59025 FETAL NON-STRESS TEST: CPT | Mod: 26 | Performed by: ADVANCED PRACTICE MIDWIFE

## 2023-02-12 PROCEDURE — 76819 FETAL BIOPHYS PROFIL W/O NST: CPT

## 2023-02-12 ASSESSMENT — ACTIVITIES OF DAILY LIVING (ADL): ADLS_ACUITY_SCORE: 35

## 2023-02-12 NOTE — TELEPHONE ENCOUNTER
OB Triage Call      Is patient's OB/Midwife with the formerly [facility] or [facility]? [facility]- Proceed with triage     Reason for call: BP    Assessment: Pt is 34 wk6d pregnant.  Pt seen in clinic 2/10/23 with /92. Pt scheduled at 11:00 am  this morning at [facility] for non stress test and BP check.  Pt was told to call if BP in the 150/100's, this mrgrayson BP is 150/102.      Plan: On call REJI paged @ 8:42 am.  Ruma Pearson returned page @ 8:48 am.  Pt to come to [facility] L&D now.  This information called to pt.    Patient plans to deliver at [facility]    Patient's primary OB Provider is REJI .      Per protocol recommendations Patient to be evaluated in L&D. Patient's primary OB is [facility]. Paged on-call midwife for patient's primary OB clinic (refer to where patient is seen as midwives may go to multiple locations) REJI Pearson to call FNA back at 8:42 am.  Call returned at 8:48 am and advised on triage assessment. Does midwife recommend L&D evaluation? Yes-  Labor and delivery at [facility] ([phone]) notified of patient's pending arrival. Patient notified to go to L&D by RN - this information called to Marisol RN [facility] L&D.      Is patient's delivering hospital on divert? No      34w6d    Estimated Date of Delivery: Mar 20, 2023        OB History    Para Term  AB Living   1 0 0 0 0 0   SAB IAB Ectopic Multiple Live Births   0 0 0 0 0      # Outcome Date GA Lbr Dave/2nd Weight Sex Delivery Anes PTL Lv   1 Current                No results found for: GBS       Magaly Martinez 23 8:51 AM  [facility] Nurse Advisor      Reason for Disposition    [1] Pregnant 20 or more weeks (or postpartum < 6 weeks) AND [2] Systolic BP >= 160 OR Diastolic >= 100    Additional Information    Negative: Difficult to awaken or acting confused (e.g., disoriented, slurred speech)    Negative: SEVERE difficulty breathing (e.g., struggling for each breath, speaks in single words)    Negative:  [1] Weakness of the face, arm or leg on one side of the body AND [2] new-onset    Negative: [1] Numbness (i.e., loss of sensation) of the face, arm or leg on one side of the body AND [2] new-onset    Negative: [1] Chest pain lasts > 5 minutes AND [2] history of heart disease (i.e., heart attack, bypass surgery, angina, angioplasty, CHF)    Negative: [1] Chest pain AND [2] took nitrogylcerin AND [3] pain was not relieved    Negative: Sounds like a life-threatening emergency to the triager    Negative: Symptom is main concern (e.g., headache, chest pain)    Negative: Low blood pressure is main concern    Negative: [1] Systolic BP  >= 160 OR Diastolic >= 100 AND [2] cardiac or neurologic symptoms (e.g., chest pain, difficulty breathing, unsteady gait, blurred vision)    Negative: [1] Pregnant 20 or more weeks (or postpartum < 6 weeks) AND [2] new hand or face swelling    Protocols used: BLOOD PRESSURE - HIGH-A-

## 2023-02-12 NOTE — DISCHARGE INSTRUCTIONS
Discharge Instruction for Undelivered Patients      You were seen for:  Increased Blood pressure  We Consulted: Ruma Pearson CNM  You had (Test or Medicine):Non stress test, BPP     Diet:   Drink 8 to 12 glasses of liquids (milk, juice, water) every day.     Activity:  Call your doctor or nurse midwife if your baby is moving less than usual.     Call your provider if you notice:  Swelling in your face or increased swelling in your hands or legs.  Headaches that are not relieved by Tylenol (acetaminophen).  Changes in your vision (blurring: seeing spots or stars.)  Nausea (sick to your stomach) and vomiting (throwing up).   Weight gain of 5 pounds or more per week.  Heartburn that doesn't go away.  Signs of bladder infection: pain when you urinate (use the toilet), need to go more often and more urgently.  The bag of santos (rupture of membranes) breaks, or you notice leaking in your underwear.  Bright red blood in your underwear.  Abdominal (lower belly) or stomach pain.  For first baby: Contractions (tightening) less than 5 minutes apart for one hour or more.  Second (plus) baby: Contractions (tightening) less than 10 minutes apart and getting stronger.  *If less than 34 weeks: Contractions (tightening) more than 6 times in one hour.  Increase or change in vaginal discharge (note the color and amount)  Other: Call your provider with any questions or concerns.    Follow-up:  As scheduled in the clinic

## 2023-02-12 NOTE — PLAN OF CARE
PT arrived from home for BP check and NST. PT is 34.6 .. PT placed on EFM and serial BP's started. Health history obtained from patient

## 2023-02-12 NOTE — PLAN OF CARE
JENNIFER Pearson CNM in to see patient. US ordered. Orders to discharge to home. Discharge instructions reviewed with patient by JENNIFER Pearson. PT verbalized understanding of instructions.

## 2023-02-12 NOTE — PROGRESS NOTES
"[facility] CNM TRIAGE NOTE    Beata Joseph is a 32 year old  with and IUP at 34w6d who presents to [facility] for a BP check and NST.   She was originally scheduled to come at 1100 but called the triage line earlier this morning after stating her at home BP check was 150/102. She denies any other symptoms of pre-eclampsia (vision changes, abdominal pain, headaches)    Patient states baby is active.  Denies ROM   Denies vaginal bleeding  Present OB History at [facility] with the CNMs.   Problems this pregnancy:   1.   Patient Active Problem List   Diagnosis     Screening for malignant neoplasm of cervix     ASCUS with positive high risk HPV cervical     Gallbladder attack     Gastroesophageal reflux disease without esophagitis     White coat syndrome without diagnosis of hypertension     Anxiety     Visual field scotoma of left eye     Mild episode of recurrent major depressive disorder (H)     Supervision of high-risk pregnancy     Hypertriglyceridemia     COVID-19 affecting pregnancy in third trimester         ROS:  Patient is alert and oriented    PHYSICAL EXAM:  BP (!) 141/84   Pulse (!) 125   Temp 98.1  F (36.7  C) (Temporal)   Resp 16   Ht 1.753 m (5' 9\")   Wt 88.9 kg (196 lb)   LMP 2022   BMI 28.94 kg/m      FHT's 145 with moderate variability  Accelerations: present   Decelerations:  absent        Contractions: Pt is timo in an irregular pattern  Abdomen: Gravid  Bloody show: Not assessed  Cervix: Not examined  Membranes are intact     Prenatal Office Visit on 02/10/2023   Component Date Value Ref Range Status     ALT 02/10/2023 14  10 - 35 U/L Final     AST 02/10/2023 22  10 - 35 U/L Final     Creatinine 02/10/2023 0.58  0.51 - 0.95 mg/dL Final     GFR Estimate 02/10/2023 >90  >60 mL/min/1.73m2 Final    eGFR calculated using  CKD-EPI equation.     Total Protein Urine mg/dL 02/10/2023 6.7  1.0 - 14.0 mg/dL Final    The reference ranges have not " been established in urine protein. The results should be integrated into the clinical context for interpretation.     Total Protein UR MG/MG CR 02/10/2023 0.13  0.00 - 0.20 mg/mg Cr Final     Creatinine Urine mg/dL 02/10/2023 49.7  mg/dL Final    The reference ranges have not been established in urine creatinine. The results should be integrated into the clinical context for interpretation.     Urea Nitrogen 02/10/2023 6.1  6.0 - 20.0 mg/dL Final     WBC Count 02/10/2023 13.0 (H)  4.0 - 11.0 10e3/uL Final     RBC Count 02/10/2023 4.18  3.80 - 5.20 10e6/uL Final     Hemoglobin 02/10/2023 11.5 (L)  11.7 - 15.7 g/dL Final     Hematocrit 02/10/2023 35.6  35.0 - 47.0 % Final     MCV 02/10/2023 85  78 - 100 fL Final     MCH 02/10/2023 27.5  26.5 - 33.0 pg Final     MCHC 02/10/2023 32.3  31.5 - 36.5 g/dL Final     RDW 02/10/2023 12.9  10.0 - 15.0 % Final     Platelet Count 02/10/2023 244  150 - 450 10e3/uL Final     % Neutrophils 02/10/2023 75  % Final     % Lymphocytes 02/10/2023 18  % Final     % Monocytes 02/10/2023 6  % Final     % Eosinophils 02/10/2023 1  % Final     % Basophils 02/10/2023 0  % Final     Absolute Neutrophils 02/10/2023 9.8 (H)  1.6 - 8.3 10e3/uL Final     Absolute Lymphocytes 02/10/2023 2.3  0.8 - 5.3 10e3/uL Final     Absolute Monocytes 02/10/2023 0.8  0.0 - 1.3 10e3/uL Final     Absolute Eosinophils 02/10/2023 0.1  0.0 - 0.7 10e3/uL Final     Absolute Basophils 02/10/2023 0.0  0.0 - 0.2 10e3/uL Final       ASSESSMENT :   32 year old  with barrios IUP 34w6d not in labor  NST reactive   Periods of FHR 170s-low 200s  GBS unknown and membranes intact  BPP 8/8, MVP 3.2, infant vtx  Pre-e labs from 2/10/23 WNL    PLAN:  BPP today for additional evaluation of fetal well being.  Results of BPP, EFM strip and BP reading discussed with Beata and Lewis.  Discussed criteria has now been met for diagnosis of GHTN. IOL is recommended at 37 weeks gestation  Discharge home  Continue routine prenatal care.  Has appt already scheduled for Thursday with a NST.  Repeat lab work Thursday  Advised to continue checking BP at home BID. Reviewed warning s/sx of pre-e and when to call. Plan for IOL may be sooner if BPs increase, lab work is abnormal or severe features present.     Teaching done r/t to s/s of labor, SROM, decreased fetal movement, comfort measures in third trimester.  Instructed to please refer to the discharge handouts, the RN triage line or on-call CNM for any questions or concerns.  Pt verbalizes understanding and agreement with current plan of care.    ALEJO Espinoza CNM

## 2023-02-16 ENCOUNTER — ALLIED HEALTH/NURSE VISIT (OUTPATIENT)
Dept: NURSING | Facility: CLINIC | Age: 33
End: 2023-02-16
Payer: COMMERCIAL

## 2023-02-16 ENCOUNTER — PRENATAL OFFICE VISIT (OUTPATIENT)
Dept: MIDWIFE SERVICES | Facility: CLINIC | Age: 33
End: 2023-02-16
Payer: COMMERCIAL

## 2023-02-16 VITALS — SYSTOLIC BLOOD PRESSURE: 142 MMHG | BODY MASS INDEX: 29.71 KG/M2 | WEIGHT: 201.2 LBS | DIASTOLIC BLOOD PRESSURE: 86 MMHG

## 2023-02-16 DIAGNOSIS — O09.93 SUPERVISION OF HIGH RISK PREGNANCY IN THIRD TRIMESTER: Primary | ICD-10-CM

## 2023-02-16 DIAGNOSIS — Z3A.35 35 WEEKS GESTATION OF PREGNANCY: ICD-10-CM

## 2023-02-16 DIAGNOSIS — O13.3 GESTATIONAL HTN, THIRD TRIMESTER: ICD-10-CM

## 2023-02-16 DIAGNOSIS — O13.3 GESTATIONAL HYPERTENSION, THIRD TRIMESTER: Primary | ICD-10-CM

## 2023-02-16 PROBLEM — O99.013 ANEMIA AFFECTING PREGNANCY IN THIRD TRIMESTER: Status: ACTIVE | Noted: 2023-02-16

## 2023-02-16 LAB
ALBUMIN MFR UR ELPH: <6 MG/DL (ref 1–14)
ALT SERPL W P-5'-P-CCNC: 11 U/L (ref 10–35)
AST SERPL W P-5'-P-CCNC: 16 U/L (ref 10–35)
BASOPHILS # BLD AUTO: 0 10E3/UL (ref 0–0.2)
BASOPHILS NFR BLD AUTO: 0 %
BUN SERPL-MCNC: 6 MG/DL (ref 6–20)
CREAT SERPL-MCNC: 0.56 MG/DL (ref 0.51–0.95)
CREAT UR-MCNC: 21.1 MG/DL
EOSINOPHIL # BLD AUTO: 0.1 10E3/UL (ref 0–0.7)
EOSINOPHIL NFR BLD AUTO: 1 %
ERYTHROCYTE [DISTWIDTH] IN BLOOD BY AUTOMATED COUNT: 13 % (ref 10–15)
GFR SERPL CREATININE-BSD FRML MDRD: >90 ML/MIN/1.73M2
HCT VFR BLD AUTO: 33.6 % (ref 35–47)
HGB BLD-MCNC: 10.8 G/DL (ref 11.7–15.7)
LYMPHOCYTES # BLD AUTO: 2.1 10E3/UL (ref 0.8–5.3)
LYMPHOCYTES NFR BLD AUTO: 17 %
MCH RBC QN AUTO: 27 PG (ref 26.5–33)
MCHC RBC AUTO-ENTMCNC: 32.1 G/DL (ref 31.5–36.5)
MCV RBC AUTO: 84 FL (ref 78–100)
MONOCYTES # BLD AUTO: 0.6 10E3/UL (ref 0–1.3)
MONOCYTES NFR BLD AUTO: 5 %
NEUTROPHILS # BLD AUTO: 9.2 10E3/UL (ref 1.6–8.3)
NEUTROPHILS NFR BLD AUTO: 77 %
PLATELET # BLD AUTO: 238 10E3/UL (ref 150–450)
PROT/CREAT 24H UR: NORMAL MG/G{CREAT}
RBC # BLD AUTO: 4 10E6/UL (ref 3.8–5.2)
WBC # BLD AUTO: 12 10E3/UL (ref 4–11)

## 2023-02-16 PROCEDURE — 99207 PR NO CHARGE NURSE ONLY: CPT

## 2023-02-16 PROCEDURE — 59425 ANTEPARTUM CARE ONLY: CPT | Performed by: ADVANCED PRACTICE MIDWIFE

## 2023-02-16 PROCEDURE — 84450 TRANSFERASE (AST) (SGOT): CPT | Performed by: ADVANCED PRACTICE MIDWIFE

## 2023-02-16 PROCEDURE — 84460 ALANINE AMINO (ALT) (SGPT): CPT | Performed by: ADVANCED PRACTICE MIDWIFE

## 2023-02-16 PROCEDURE — 84156 ASSAY OF PROTEIN URINE: CPT | Performed by: ADVANCED PRACTICE MIDWIFE

## 2023-02-16 PROCEDURE — 85025 COMPLETE CBC W/AUTO DIFF WBC: CPT | Performed by: ADVANCED PRACTICE MIDWIFE

## 2023-02-16 PROCEDURE — 82565 ASSAY OF CREATININE: CPT | Performed by: ADVANCED PRACTICE MIDWIFE

## 2023-02-16 PROCEDURE — 99207 PR PRENATAL VISIT: CPT | Performed by: ADVANCED PRACTICE MIDWIFE

## 2023-02-16 PROCEDURE — 84520 ASSAY OF UREA NITROGEN: CPT | Performed by: ADVANCED PRACTICE MIDWIFE

## 2023-02-16 PROCEDURE — 36415 COLL VENOUS BLD VENIPUNCTURE: CPT | Performed by: ADVANCED PRACTICE MIDWIFE

## 2023-02-16 PROCEDURE — 59025 FETAL NON-STRESS TEST: CPT

## 2023-02-16 NOTE — NURSING NOTE
Patient here for NST per order from Ruma Pearson CNM - 35w3d and recently dx w GHTN  External monitors placed after explanation and consent from patient about the procedure.  Denies contractions or cramping, LOF or VB.  Reports active FM    NST INTERPRETATION    Baseline Rate: 145  Variability: moderate  Accelerations: present  Decelerations: none  Reactivity confirmed after strip reviewed by Bing Rivera CNM    Pt discharged home without incident with instructions to call with any concerns or sx's of labor or DFM.  Pt verbalized understanding, in agreement with plan, and voiced no further questions.    Taken to lab and then home.  Sandra Coley RN on 2/16/2023 at 9:34 AM

## 2023-02-16 NOTE — PATIENT INSTRUCTIONS
[facility] BIRTHPLACE VIRTUAL TOUR    Please copy and paste the link below to view the virtual tour...    [email or web address]      SIGNS OF  LABOR    Labor is  if it happens more than three weeks before your due date.    It can be hard to know if you are in labor, since the symptoms can be like the normal feelings of pregnancy.  Often, the only difference is the symptoms increase or they don't go away.     Signs of  labor can include:    Contractions which can feel like period cramps or gas pain.  You may feel it in the lower part of your abdomen, in your back, or as a pressure feeling in your bottom.  It is often regular, coming every 5 or 10 minutes, and  lasting about 30-60 seconds. Some contractions are normal during pregnancy ([place] dupree contractions) but if you are feeling more than 5-6 in one hour that is NOT normal  If this occurs empty your bladder, then drink 2-3 glasses of water, eat a snack, and lay down on your left side. Put your hand on your abdomen to count the contractions.  If after one hour of resting you have still had 5-6 contractions call your clinic right away.    If you feel a pop, gush, or trickle of fluid it may mean that your bag of water has broken and you should contact the clinic     You may also experience loose stools and/or rectal pressure     Listen to your body, if something doesn't seem right please call us at the clinic    Risk Factors    Previous  delivery  Bacterial Vaginosis- if you notice a fishy smell to your discharge or experience vaginal itching/discomfort you should be evaluated for infection  Smoking  Drug abuse  Adolescent (teen) pregnancy or advanced maternal age (AMA) age 35 and over  Dehydration (this may not cause  labor but it can cause contractions)    If you think you are in  labor we may do some lab testing in the clinic or send you to  "the hospital for evaluation    Please call us if you are concerned you are in  labor.    [facility]  [phone]    PREECLAMPSIA SIGNS AND SYMPTOMS    Preeclampsia is a dangerous condition that some women develop in the second half of pregnancy. It can also begin after the baby is born.  Preeclampsia causes high blood pressure and can cause problems with many organ systems in your body.  It can also affect the growth of your baby. The exact cause of preeclampsia is unknown, however, there are signs and symptoms to watch for:    -A bad headache that doesn't improve with Tylenol  -Visual changes such as spots, flashes of light, blurry vision  -Pain in the upper right part of your abdomen, especially under the ribs that doesn't go away  -Nausea and/or vomiting  -Feeling extremely tired  -Yellowing of the skin and/or eyes  -Feeling \"not quite right\" or that something is wrong  -An extreme amount of swelling (some swelling in pregnancy is very normal)    If your midwife feels that you are developing preeclampsia, you will have lab tests drawn and will be monitored very closely.     If you are experiencing anyof these symptoms, call the [facility] immediately at [phone].      Fetal Kick Counts    It is important to know when your baby's movements occur. We often get busy with work and life and do not pay close attention to their movements.      Women typically begin feeling movement between 18-22 weeks of gestation, sometimes it can be earlier or later depending on where your placenta is     Movements usually begin feeling like popping or fluttering and as the baby grows they become more pronounced    Toward the end of pregnancy as the baby gets larger they may not move as much or make as big of movements. Babies have maturing sleep cycles as well as not as much room to move and flip. If you are ever concerned about your baby's movements or have not felt the baby move for a " while, we recommend you do a fetal kick count. Prior to starting your count drink a glass of water or juice and eat a snack. Then lay down on your side and begin to count movements.     How to do a Fetal Kick Counts    There are many different ways to monitor your baby's movements. Movements can range from large jabs to small kicks, or wiggles.  Hiccups count!    Count 10 movements in 2 hours when resting and focusing  Count 10 movements in 12 hours when doing normal activity    We recommend that if movements occur but seem decreased that you should be seen in the clinic or hospital for evaluation within 12 hours. If fetal movement is absent or fetal kick counts are low please contact us right away.    If you ever have any concerns about your baby's movements DO NOT HESITATE to call us, we are here for you!    [facility]  [phone]        COMMON MEDICATIONS AFTER GIVING BIRTH    After giving birth, it is common to be prescribed medications that are available over the counter.  It is recommended that you purchase these medications before you have your baby and have at home.  The common medications are:  Colace 100mg (generic name docusate sodium)  Motrin or Advil 200mg (generic name ibuprofen)  Tylenol 325-500mg (generic name acetaminophen)    If you are unable to find the medications or would prefer to have prescriptions sent to a pharmacy, that can be done when you are being discharged from the hospital.  If you have any questions or concerns, please do not hesitate to contact our clinic: [phone].

## 2023-02-16 NOTE — PROGRESS NOTES
35w3d  Feels well overall. Here Lewis today.   Feeling anxious overall. Okay with 37 week induction, but nervous.   Fetal Movement: positive, denies loss of fluid/vb, no contractions  Fetal kick counts/movement reviewed    Gestational hypertension: /86. Denies headache, VD. Discussed  testing twice weekly and IOL at 37w0d. Labs drawn today. NST done earlier today.     Discussed what an IOL would look like. Reviewed that the IOL may be long. She can opt for a  section if the induction is long or if it increases her anxiety too much.     Reviewed that we are strict on BP in the postpartum times. She may have to be started on BP lowering medication in the pp period. Also discussed potentially adding in another medication (currently on Wellbutrin) in the postpartum period to manage anxiety. She has an intake appointment with a therapist next Thursday.     BP at home has been elevated. Discussed calibrating cuff. Plan for appointment tomorrow.     Reviewed PTL precautions and s/sx of preeclampsia; denies any S&S and aware of what to report     Discussed GBS at next visit.  Return to clinic on Monday for prenatal visit/ BPP.     Berta Sykes, DNP, APRN, CNM

## 2023-02-17 ENCOUNTER — PRENATAL OFFICE VISIT (OUTPATIENT)
Dept: MIDWIFE SERVICES | Facility: CLINIC | Age: 33
End: 2023-02-17
Payer: COMMERCIAL

## 2023-02-17 VITALS — BODY MASS INDEX: 29.09 KG/M2 | SYSTOLIC BLOOD PRESSURE: 155 MMHG | WEIGHT: 197 LBS | DIASTOLIC BLOOD PRESSURE: 95 MMHG

## 2023-02-17 DIAGNOSIS — O13.3 GESTATIONAL HTN, THIRD TRIMESTER: ICD-10-CM

## 2023-02-17 DIAGNOSIS — Z71.89 ENCOUNTER FOR ANTEPARTUM CONSULTATION REGARDING LACTATION: Primary | ICD-10-CM

## 2023-02-17 DIAGNOSIS — Z36.85 SCREENING, ANTENATAL, FOR STREPTOCOCCUS B: ICD-10-CM

## 2023-02-17 LAB
ALBUMIN MFR UR ELPH: 10 MG/DL (ref 1–14)
ALBUMIN SERPL BCG-MCNC: 3.7 G/DL (ref 3.5–5.2)
ALP SERPL-CCNC: 129 U/L (ref 35–104)
ALT SERPL W P-5'-P-CCNC: 13 U/L (ref 10–35)
ANION GAP SERPL CALCULATED.3IONS-SCNC: 12 MMOL/L (ref 7–15)
AST SERPL W P-5'-P-CCNC: 19 U/L (ref 10–35)
BASOPHILS # BLD AUTO: 0 10E3/UL (ref 0–0.2)
BASOPHILS NFR BLD AUTO: 0 %
BILIRUB SERPL-MCNC: 0.2 MG/DL
BUN SERPL-MCNC: 6.6 MG/DL (ref 6–20)
CALCIUM SERPL-MCNC: 9.2 MG/DL (ref 8.6–10)
CHLORIDE SERPL-SCNC: 105 MMOL/L (ref 98–107)
CREAT SERPL-MCNC: 0.59 MG/DL (ref 0.51–0.95)
CREAT UR-MCNC: 49.2 MG/DL
DEPRECATED HCO3 PLAS-SCNC: 19 MMOL/L (ref 22–29)
EOSINOPHIL # BLD AUTO: 0.1 10E3/UL (ref 0–0.7)
EOSINOPHIL NFR BLD AUTO: 1 %
ERYTHROCYTE [DISTWIDTH] IN BLOOD BY AUTOMATED COUNT: 13.1 % (ref 10–15)
GFR SERPL CREATININE-BSD FRML MDRD: >90 ML/MIN/1.73M2
GLUCOSE SERPL-MCNC: 76 MG/DL (ref 70–99)
HCT VFR BLD AUTO: 34.5 % (ref 35–47)
HGB BLD-MCNC: 11.1 G/DL (ref 11.7–15.7)
LYMPHOCYTES # BLD AUTO: 2.2 10E3/UL (ref 0.8–5.3)
LYMPHOCYTES NFR BLD AUTO: 18 %
MCH RBC QN AUTO: 27.1 PG (ref 26.5–33)
MCHC RBC AUTO-ENTMCNC: 32.2 G/DL (ref 31.5–36.5)
MCV RBC AUTO: 84 FL (ref 78–100)
MONOCYTES # BLD AUTO: 0.7 10E3/UL (ref 0–1.3)
MONOCYTES NFR BLD AUTO: 6 %
NEUTROPHILS # BLD AUTO: 9.2 10E3/UL (ref 1.6–8.3)
NEUTROPHILS NFR BLD AUTO: 76 %
PLATELET # BLD AUTO: 265 10E3/UL (ref 150–450)
POTASSIUM SERPL-SCNC: 4.4 MMOL/L (ref 3.4–5.3)
PROT SERPL-MCNC: 6.4 G/DL (ref 6.4–8.3)
PROT/CREAT 24H UR: 0.2 MG/MG CR (ref 0–0.2)
RBC # BLD AUTO: 4.1 10E6/UL (ref 3.8–5.2)
SODIUM SERPL-SCNC: 136 MMOL/L (ref 136–145)
URATE SERPL-MCNC: 4 MG/DL (ref 2.4–5.7)
WBC # BLD AUTO: 12.2 10E3/UL (ref 4–11)

## 2023-02-17 PROCEDURE — 87653 STREP B DNA AMP PROBE: CPT | Performed by: ADVANCED PRACTICE MIDWIFE

## 2023-02-17 PROCEDURE — 84550 ASSAY OF BLOOD/URIC ACID: CPT | Performed by: ADVANCED PRACTICE MIDWIFE

## 2023-02-17 PROCEDURE — 84156 ASSAY OF PROTEIN URINE: CPT | Performed by: ADVANCED PRACTICE MIDWIFE

## 2023-02-17 PROCEDURE — 80053 COMPREHEN METABOLIC PANEL: CPT | Performed by: ADVANCED PRACTICE MIDWIFE

## 2023-02-17 PROCEDURE — 36415 COLL VENOUS BLD VENIPUNCTURE: CPT | Performed by: ADVANCED PRACTICE MIDWIFE

## 2023-02-17 PROCEDURE — 85025 COMPLETE CBC W/AUTO DIFF WBC: CPT | Performed by: ADVANCED PRACTICE MIDWIFE

## 2023-02-17 PROCEDURE — 99213 OFFICE O/P EST LOW 20 MIN: CPT | Performed by: ADVANCED PRACTICE MIDWIFE

## 2023-02-17 NOTE — PROGRESS NOTES
S: Beata Joseph is a  with and IUP at 35w4d. She is here today to check her home BP cuff against our office cuff. She has had increased blood pressures in the office (140s/90s) and a very elevated blood pressure at home (160s systolic).     She denies abnormal visual disturbances. She has had floaters for her entire pregnancy, but they have not increased in intensity or frequency. She denies headaches and abdominal pain.     O:  Wt 89.4 kg (197 lb)   LMP 2022   BMI 29.09 kg/m    BP: 155/95 manual    Home BP cuff was taken on my left arm and had a BP of 166/90. A few minutes later my blood pressure was taken by an MA with our BP cuff and it was 129/68. Beata's BP was taken via the home cuff and it was 162/86. Her BP taken manually was about the same. I retook it 5 minutes later and it was 155/95.     A:  1. Encounter for antepartum consultation regarding lactation  - Breast Pump Order for DME - ONLY FOR DME      2. Gestational HTN, third trimester  - Home Blood Pressure Monitor Order for DME - ONLY FOR DME  - Protein  random urine  - CBC with platelets and differential  - Comprehensive metabolic panel (BMP + Alb, Alk Phos, ALT, AST, Total. Bili, TP)  - Uric acid    3. Screening, , for Streptococcus B  - Group B strep PCR      PLAN:   NST, BPP, and labs scheduled for  at [facility].     Follow up appointments for next week will depend on 's results. She has another appt already scheduled for Monday in clinic. We will adjust the appointment if needed.     IOL scheduled for  at 0730.     Home blood pressure cuff ordered today. Her other one is not accurate.     Berta Sykes, DNP, APRN, CNM

## 2023-02-17 NOTE — LETTER
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 Her GEORGES is 3/20/2022. At this time we have advised her to stop working due to medical reasons. If you have any questions, concerns or need additional paperwork filled out, please let us know.    Thank you,        Berta Sykes, DNP, APRN, CNM    [facility]  [address], [address]  [place]  [address]  T: [phone]  F: [phone]

## 2023-02-17 NOTE — PATIENT INSTRUCTIONS
Tae Cota,    We have scheduled you for cervical ripening on February 27, 2023  at 7:30 am.    [facility] is eager to provide you with the best care possible during your scheduled induction. We will do all we can to provide you the care as scheduled. However, because we are never sure how many patient we will have at any given time, there may be times when your scheduled induction will need to be delayed. You can be assured that your induction will not be delayed beyond a point of safety for you and your baby.    One (1) hour before your scheduled induction, please call [facility] at [phone] and ask to speak to the charge nurse to verify your induction. The charge nurse will verify the time you should arrive at the hospital.    On arrival to the hospital, please park in the [address] Parking Ramp (near the Emergency Room). From there go to Door 6 (there is a large overhang over the door). Someone will be there to help you find your way to the Labor & Delivery Unit on the 2nd floor.     Thank you, and we look forward to caring for you and your baby!  -Berta Sykes, DNP, APRN, CNM

## 2023-02-18 LAB — GP B STREP DNA SPEC QL NAA+PROBE: NEGATIVE

## 2023-02-19 ENCOUNTER — HOSPITAL ENCOUNTER (INPATIENT)
Facility: CLINIC | Age: 33
LOS: 3 days | Discharge: HOME HEALTH | End: 2023-02-22
Attending: OBSTETRICS & GYNECOLOGY | Admitting: OBSTETRICS & GYNECOLOGY
Payer: COMMERCIAL

## 2023-02-19 ENCOUNTER — APPOINTMENT (OUTPATIENT)
Dept: ULTRASOUND IMAGING | Facility: CLINIC | Age: 33
End: 2023-02-19
Attending: ADVANCED PRACTICE MIDWIFE
Payer: COMMERCIAL

## 2023-02-19 DIAGNOSIS — Z98.891 S/P CESAREAN SECTION: ICD-10-CM

## 2023-02-19 LAB
ABO/RH(D): NORMAL
ALBUMIN MFR UR ELPH: <6 MG/DL (ref 1–14)
ALBUMIN SERPL BCG-MCNC: 3.4 G/DL (ref 3.5–5.2)
ALP SERPL-CCNC: 144 U/L (ref 35–104)
ALT SERPL W P-5'-P-CCNC: 16 U/L (ref 10–35)
ANION GAP SERPL CALCULATED.3IONS-SCNC: 11 MMOL/L (ref 7–15)
ANTIBODY SCREEN: NEGATIVE
AST SERPL W P-5'-P-CCNC: 19 U/L (ref 10–35)
BILIRUB SERPL-MCNC: 0.2 MG/DL
BUN SERPL-MCNC: 5.8 MG/DL (ref 6–20)
CALCIUM SERPL-MCNC: 9.4 MG/DL (ref 8.6–10)
CHLORIDE SERPL-SCNC: 106 MMOL/L (ref 98–107)
CREAT SERPL-MCNC: 0.47 MG/DL (ref 0.51–0.95)
CREAT UR-MCNC: 23.6 MG/DL
DEPRECATED HCO3 PLAS-SCNC: 19 MMOL/L (ref 22–29)
ERYTHROCYTE [DISTWIDTH] IN BLOOD BY AUTOMATED COUNT: 13.1 % (ref 10–15)
GFR SERPL CREATININE-BSD FRML MDRD: >90 ML/MIN/1.73M2
GLUCOSE SERPL-MCNC: 84 MG/DL (ref 70–99)
HCT VFR BLD AUTO: 35 % (ref 35–47)
HGB BLD-MCNC: 11.2 G/DL (ref 11.7–15.7)
HOLD SPECIMEN: NORMAL
MCH RBC QN AUTO: 26.6 PG (ref 26.5–33)
MCHC RBC AUTO-ENTMCNC: 32 G/DL (ref 31.5–36.5)
MCV RBC AUTO: 83 FL (ref 78–100)
PLATELET # BLD AUTO: 273 10E3/UL (ref 150–450)
POTASSIUM SERPL-SCNC: 4.2 MMOL/L (ref 3.4–5.3)
PROT SERPL-MCNC: 6.5 G/DL (ref 6.4–8.3)
PROT/CREAT 24H UR: NORMAL MG/G{CREAT}
RBC # BLD AUTO: 4.21 10E6/UL (ref 3.8–5.2)
SODIUM SERPL-SCNC: 136 MMOL/L (ref 136–145)
SPECIMEN EXPIRATION DATE: NORMAL
WBC # BLD AUTO: 14 10E3/UL (ref 4–11)

## 2023-02-19 PROCEDURE — 86850 RBC ANTIBODY SCREEN: CPT | Performed by: OBSTETRICS & GYNECOLOGY

## 2023-02-19 PROCEDURE — 76819 FETAL BIOPHYS PROFIL W/O NST: CPT

## 2023-02-19 PROCEDURE — 3E0P7VZ INTRODUCTION OF HORMONE INTO FEMALE REPRODUCTIVE, VIA NATURAL OR ARTIFICIAL OPENING: ICD-10-PCS | Performed by: OBSTETRICS & GYNECOLOGY

## 2023-02-19 PROCEDURE — 85027 COMPLETE CBC AUTOMATED: CPT | Performed by: ADVANCED PRACTICE MIDWIFE

## 2023-02-19 PROCEDURE — 80053 COMPREHEN METABOLIC PANEL: CPT | Performed by: ADVANCED PRACTICE MIDWIFE

## 2023-02-19 PROCEDURE — 59025 FETAL NON-STRESS TEST: CPT

## 2023-02-19 PROCEDURE — 84156 ASSAY OF PROTEIN URINE: CPT | Performed by: ADVANCED PRACTICE MIDWIFE

## 2023-02-19 PROCEDURE — G0463 HOSPITAL OUTPT CLINIC VISIT: HCPCS | Mod: 25

## 2023-02-19 PROCEDURE — 250N000011 HC RX IP 250 OP 636: Performed by: OBSTETRICS & GYNECOLOGY

## 2023-02-19 PROCEDURE — 258N000003 HC RX IP 258 OP 636: Performed by: OBSTETRICS & GYNECOLOGY

## 2023-02-19 PROCEDURE — 120N000001 HC R&B MED SURG/OB

## 2023-02-19 PROCEDURE — 36415 COLL VENOUS BLD VENIPUNCTURE: CPT | Performed by: ADVANCED PRACTICE MIDWIFE

## 2023-02-19 PROCEDURE — 86780 TREPONEMA PALLIDUM: CPT | Performed by: OBSTETRICS & GYNECOLOGY

## 2023-02-19 PROCEDURE — 86901 BLOOD TYPING SEROLOGIC RH(D): CPT | Performed by: OBSTETRICS & GYNECOLOGY

## 2023-02-19 PROCEDURE — 250N000013 HC RX MED GY IP 250 OP 250 PS 637: Performed by: OBSTETRICS & GYNECOLOGY

## 2023-02-19 PROCEDURE — 36415 COLL VENOUS BLD VENIPUNCTURE: CPT | Performed by: OBSTETRICS & GYNECOLOGY

## 2023-02-19 RX ORDER — ONDANSETRON 2 MG/ML
4 INJECTION INTRAMUSCULAR; INTRAVENOUS EVERY 6 HOURS PRN
Status: DISCONTINUED | OUTPATIENT
Start: 2023-02-19 | End: 2023-02-20

## 2023-02-19 RX ORDER — MISOPROSTOL 100 UG/1
25 TABLET ORAL EVERY 4 HOURS PRN
Status: DISCONTINUED | OUTPATIENT
Start: 2023-02-19 | End: 2023-02-20

## 2023-02-19 RX ORDER — ONDANSETRON 2 MG/ML
4 INJECTION INTRAMUSCULAR; INTRAVENOUS EVERY 6 HOURS PRN
Status: DISCONTINUED | OUTPATIENT
Start: 2023-02-19 | End: 2023-02-19 | Stop reason: HOSPADM

## 2023-02-19 RX ORDER — LIDOCAINE 40 MG/G
CREAM TOPICAL
Status: DISCONTINUED | OUTPATIENT
Start: 2023-02-19 | End: 2023-02-19

## 2023-02-19 RX ORDER — MAGNESIUM SULFATE HEPTAHYDRATE 40 MG/ML
4 INJECTION, SOLUTION INTRAVENOUS ONCE
Status: COMPLETED | OUTPATIENT
Start: 2023-02-19 | End: 2023-02-19

## 2023-02-19 RX ORDER — TERBUTALINE SULFATE 1 MG/ML
0.25 INJECTION, SOLUTION SUBCUTANEOUS
Status: DISCONTINUED | OUTPATIENT
Start: 2023-02-19 | End: 2023-02-20

## 2023-02-19 RX ORDER — MISOPROSTOL 200 UG/1
800 TABLET ORAL
Status: DISCONTINUED | OUTPATIENT
Start: 2023-02-19 | End: 2023-02-20

## 2023-02-19 RX ORDER — NALOXONE HYDROCHLORIDE 0.4 MG/ML
0.4 INJECTION, SOLUTION INTRAMUSCULAR; INTRAVENOUS; SUBCUTANEOUS
Status: DISCONTINUED | OUTPATIENT
Start: 2023-02-19 | End: 2023-02-20

## 2023-02-19 RX ORDER — LORAZEPAM 2 MG/ML
2 INJECTION INTRAMUSCULAR
Status: DISCONTINUED | OUTPATIENT
Start: 2023-02-19 | End: 2023-02-22 | Stop reason: HOSPADM

## 2023-02-19 RX ORDER — CALCIUM CARBONATE 500 MG/1
1000 TABLET, CHEWABLE ORAL 3 TIMES DAILY PRN
Status: DISCONTINUED | OUTPATIENT
Start: 2023-02-19 | End: 2023-02-22 | Stop reason: HOSPADM

## 2023-02-19 RX ORDER — METOCLOPRAMIDE 10 MG/1
10 TABLET ORAL EVERY 6 HOURS PRN
Status: DISCONTINUED | OUTPATIENT
Start: 2023-02-19 | End: 2023-02-19 | Stop reason: HOSPADM

## 2023-02-19 RX ORDER — MAGNESIUM SULFATE HEPTAHYDRATE 40 MG/ML
4 INJECTION, SOLUTION INTRAVENOUS
Status: DISCONTINUED | OUTPATIENT
Start: 2023-02-19 | End: 2023-02-22 | Stop reason: HOSPADM

## 2023-02-19 RX ORDER — CALCIUM GLUCONATE 94 MG/ML
1 INJECTION, SOLUTION INTRAVENOUS
Status: DISCONTINUED | OUTPATIENT
Start: 2023-02-19 | End: 2023-02-22 | Stop reason: HOSPADM

## 2023-02-19 RX ORDER — OXYTOCIN/0.9 % SODIUM CHLORIDE 30/500 ML
100-340 PLASTIC BAG, INJECTION (ML) INTRAVENOUS CONTINUOUS PRN
Status: DISCONTINUED | OUTPATIENT
Start: 2023-02-19 | End: 2023-02-20

## 2023-02-19 RX ORDER — MAGNESIUM SULFATE HEPTAHYDRATE 500 MG/ML
10 INJECTION, SOLUTION INTRAMUSCULAR; INTRAVENOUS
Status: DISCONTINUED | OUTPATIENT
Start: 2023-02-19 | End: 2023-02-22 | Stop reason: HOSPADM

## 2023-02-19 RX ORDER — NALOXONE HYDROCHLORIDE 0.4 MG/ML
0.2 INJECTION, SOLUTION INTRAMUSCULAR; INTRAVENOUS; SUBCUTANEOUS
Status: DISCONTINUED | OUTPATIENT
Start: 2023-02-19 | End: 2023-02-20

## 2023-02-19 RX ORDER — SODIUM CHLORIDE, SODIUM LACTATE, POTASSIUM CHLORIDE, CALCIUM CHLORIDE 600; 310; 30; 20 MG/100ML; MG/100ML; MG/100ML; MG/100ML
10-125 INJECTION, SOLUTION INTRAVENOUS CONTINUOUS
Status: DISCONTINUED | OUTPATIENT
Start: 2023-02-19 | End: 2023-02-22 | Stop reason: HOSPADM

## 2023-02-19 RX ORDER — PROCHLORPERAZINE MALEATE 5 MG
10 TABLET ORAL EVERY 6 HOURS PRN
Status: DISCONTINUED | OUTPATIENT
Start: 2023-02-19 | End: 2023-02-19 | Stop reason: HOSPADM

## 2023-02-19 RX ORDER — CITRIC ACID/SODIUM CITRATE 334-500MG
30 SOLUTION, ORAL ORAL
Status: DISCONTINUED | OUTPATIENT
Start: 2023-02-19 | End: 2023-02-20

## 2023-02-19 RX ORDER — MAGNESIUM SULFATE IN WATER 40 MG/ML
2 INJECTION, SOLUTION INTRAVENOUS CONTINUOUS
Status: DISCONTINUED | OUTPATIENT
Start: 2023-02-19 | End: 2023-02-20

## 2023-02-19 RX ORDER — PROCHLORPERAZINE 25 MG
25 SUPPOSITORY, RECTAL RECTAL EVERY 12 HOURS PRN
Status: DISCONTINUED | OUTPATIENT
Start: 2023-02-19 | End: 2023-02-19 | Stop reason: HOSPADM

## 2023-02-19 RX ORDER — LABETALOL 200 MG/1
200 TABLET, FILM COATED ORAL EVERY 12 HOURS SCHEDULED
Status: DISCONTINUED | OUTPATIENT
Start: 2023-02-19 | End: 2023-02-20

## 2023-02-19 RX ORDER — ONDANSETRON 4 MG/1
4 TABLET, ORALLY DISINTEGRATING ORAL EVERY 6 HOURS PRN
Status: DISCONTINUED | OUTPATIENT
Start: 2023-02-19 | End: 2023-02-19 | Stop reason: HOSPADM

## 2023-02-19 RX ORDER — FENTANYL CITRATE 50 UG/ML
100 INJECTION, SOLUTION INTRAMUSCULAR; INTRAVENOUS
Status: DISCONTINUED | OUTPATIENT
Start: 2023-02-19 | End: 2023-02-20

## 2023-02-19 RX ORDER — LABETALOL HYDROCHLORIDE 5 MG/ML
20-80 INJECTION, SOLUTION INTRAVENOUS EVERY 10 MIN PRN
Status: DISCONTINUED | OUTPATIENT
Start: 2023-02-19 | End: 2023-02-22 | Stop reason: HOSPADM

## 2023-02-19 RX ORDER — PROCHLORPERAZINE MALEATE 5 MG
10 TABLET ORAL EVERY 6 HOURS PRN
Status: DISCONTINUED | OUTPATIENT
Start: 2023-02-19 | End: 2023-02-20

## 2023-02-19 RX ORDER — METOCLOPRAMIDE 10 MG/1
10 TABLET ORAL EVERY 6 HOURS PRN
Status: DISCONTINUED | OUTPATIENT
Start: 2023-02-19 | End: 2023-02-20

## 2023-02-19 RX ORDER — HYDROXYZINE HYDROCHLORIDE 25 MG/1
50 TABLET, FILM COATED ORAL
Status: DISCONTINUED | OUTPATIENT
Start: 2023-02-19 | End: 2023-02-20

## 2023-02-19 RX ORDER — ACETAMINOPHEN 325 MG/1
650 TABLET ORAL EVERY 4 HOURS PRN
Status: DISCONTINUED | OUTPATIENT
Start: 2023-02-19 | End: 2023-02-20

## 2023-02-19 RX ORDER — METOCLOPRAMIDE HYDROCHLORIDE 5 MG/ML
10 INJECTION INTRAMUSCULAR; INTRAVENOUS EVERY 6 HOURS PRN
Status: DISCONTINUED | OUTPATIENT
Start: 2023-02-19 | End: 2023-02-19 | Stop reason: HOSPADM

## 2023-02-19 RX ORDER — SODIUM CHLORIDE, SODIUM LACTATE, POTASSIUM CHLORIDE, CALCIUM CHLORIDE 600; 310; 30; 20 MG/100ML; MG/100ML; MG/100ML; MG/100ML
INJECTION, SOLUTION INTRAVENOUS CONTINUOUS
Status: DISCONTINUED | OUTPATIENT
Start: 2023-02-19 | End: 2023-02-19

## 2023-02-19 RX ORDER — HYDRALAZINE HYDROCHLORIDE 20 MG/ML
10 INJECTION INTRAMUSCULAR; INTRAVENOUS
Status: DISCONTINUED | OUTPATIENT
Start: 2023-02-19 | End: 2023-02-22 | Stop reason: HOSPADM

## 2023-02-19 RX ORDER — MISOPROSTOL 200 UG/1
400 TABLET ORAL
Status: DISCONTINUED | OUTPATIENT
Start: 2023-02-19 | End: 2023-02-20

## 2023-02-19 RX ORDER — ONDANSETRON 4 MG/1
4 TABLET, ORALLY DISINTEGRATING ORAL EVERY 6 HOURS PRN
Status: DISCONTINUED | OUTPATIENT
Start: 2023-02-19 | End: 2023-02-20

## 2023-02-19 RX ORDER — OXYCODONE HYDROCHLORIDE 5 MG/1
5 TABLET ORAL
Status: DISCONTINUED | OUTPATIENT
Start: 2023-02-19 | End: 2023-02-20

## 2023-02-19 RX ORDER — KETOROLAC TROMETHAMINE 30 MG/ML
30 INJECTION, SOLUTION INTRAMUSCULAR; INTRAVENOUS
Status: DISCONTINUED | OUTPATIENT
Start: 2023-02-19 | End: 2023-02-20

## 2023-02-19 RX ORDER — TRANEXAMIC ACID 10 MG/ML
1 INJECTION, SOLUTION INTRAVENOUS EVERY 30 MIN PRN
Status: DISCONTINUED | OUTPATIENT
Start: 2023-02-19 | End: 2023-02-20

## 2023-02-19 RX ORDER — METHYLERGONOVINE MALEATE 0.2 MG/ML
200 INJECTION INTRAVENOUS
Status: DISCONTINUED | OUTPATIENT
Start: 2023-02-19 | End: 2023-02-20

## 2023-02-19 RX ORDER — OXYTOCIN/0.9 % SODIUM CHLORIDE 30/500 ML
340 PLASTIC BAG, INJECTION (ML) INTRAVENOUS CONTINUOUS PRN
Status: DISCONTINUED | OUTPATIENT
Start: 2023-02-19 | End: 2023-02-20

## 2023-02-19 RX ORDER — BETAMETHASONE SODIUM PHOSPHATE AND BETAMETHASONE ACETATE 3; 3 MG/ML; MG/ML
12 INJECTION, SUSPENSION INTRA-ARTICULAR; INTRALESIONAL; INTRAMUSCULAR; SOFT TISSUE DAILY
Status: DISCONTINUED | OUTPATIENT
Start: 2023-02-19 | End: 2023-02-20

## 2023-02-19 RX ORDER — OXYTOCIN 10 [USP'U]/ML
10 INJECTION, SOLUTION INTRAMUSCULAR; INTRAVENOUS
Status: DISCONTINUED | OUTPATIENT
Start: 2023-02-19 | End: 2023-02-20

## 2023-02-19 RX ORDER — MAGNESIUM SULFATE HEPTAHYDRATE 40 MG/ML
2 INJECTION, SOLUTION INTRAVENOUS
Status: DISCONTINUED | OUTPATIENT
Start: 2023-02-19 | End: 2023-02-22 | Stop reason: HOSPADM

## 2023-02-19 RX ORDER — PROCHLORPERAZINE 25 MG
25 SUPPOSITORY, RECTAL RECTAL EVERY 12 HOURS PRN
Status: DISCONTINUED | OUTPATIENT
Start: 2023-02-19 | End: 2023-02-20

## 2023-02-19 RX ORDER — CARBOPROST TROMETHAMINE 250 UG/ML
250 INJECTION, SOLUTION INTRAMUSCULAR
Status: DISCONTINUED | OUTPATIENT
Start: 2023-02-19 | End: 2023-02-20

## 2023-02-19 RX ORDER — LIDOCAINE 40 MG/G
CREAM TOPICAL
Status: DISCONTINUED | OUTPATIENT
Start: 2023-02-19 | End: 2023-02-20

## 2023-02-19 RX ORDER — IBUPROFEN 400 MG/1
800 TABLET, FILM COATED ORAL
Status: DISCONTINUED | OUTPATIENT
Start: 2023-02-19 | End: 2023-02-20

## 2023-02-19 RX ORDER — METOCLOPRAMIDE HYDROCHLORIDE 5 MG/ML
10 INJECTION INTRAMUSCULAR; INTRAVENOUS EVERY 6 HOURS PRN
Status: DISCONTINUED | OUTPATIENT
Start: 2023-02-19 | End: 2023-02-20

## 2023-02-19 RX ADMIN — MISOPROSTOL 25 MCG: 100 TABLET ORAL at 20:17

## 2023-02-19 RX ADMIN — BETAMETHASONE SODIUM PHOSPHATE AND BETAMETHASONE ACETATE 12 MG: 3; 3 INJECTION, SUSPENSION INTRA-ARTICULAR; INTRALESIONAL; INTRAMUSCULAR at 12:47

## 2023-02-19 RX ADMIN — MAGNESIUM SULFATE HEPTAHYDRATE 4 G: 40 INJECTION, SOLUTION INTRAVENOUS at 12:43

## 2023-02-19 RX ADMIN — LABETALOL HYDROCHLORIDE 200 MG: 200 TABLET, FILM COATED ORAL at 12:42

## 2023-02-19 RX ADMIN — SODIUM CHLORIDE, POTASSIUM CHLORIDE, SODIUM LACTATE AND CALCIUM CHLORIDE 125 ML/HR: 600; 310; 30; 20 INJECTION, SOLUTION INTRAVENOUS at 12:15

## 2023-02-19 RX ADMIN — ONDANSETRON 4 MG: 2 INJECTION INTRAMUSCULAR; INTRAVENOUS at 13:25

## 2023-02-19 RX ADMIN — MISOPROSTOL 25 MCG: 100 TABLET ORAL at 13:27

## 2023-02-19 RX ADMIN — HYDROXYZINE HYDROCHLORIDE 50 MG: 25 TABLET, FILM COATED ORAL at 22:31

## 2023-02-19 RX ADMIN — MAGNESIUM SULFATE IN WATER 2 G/HR: 40 INJECTION, SOLUTION INTRAVENOUS at 13:15

## 2023-02-19 RX ADMIN — MAGNESIUM SULFATE IN WATER 2 G/HR: 40 INJECTION, SOLUTION INTRAVENOUS at 22:37

## 2023-02-19 ASSESSMENT — ACTIVITIES OF DAILY LIVING (ADL)
ADLS_ACUITY_SCORE: 31
DOING_ERRANDS_INDEPENDENTLY_DIFFICULTY: NO
CONCENTRATING,_REMEMBERING_OR_MAKING_DECISIONS_DIFFICULTY: NO
WEAR_GLASSES_OR_BLIND: YES
ADLS_ACUITY_SCORE: 20
DIFFICULTY_EATING/SWALLOWING: NO
WALKING_OR_CLIMBING_STAIRS_DIFFICULTY: NO
ADLS_ACUITY_SCORE: 31
ADLS_ACUITY_SCORE: 35
ADLS_ACUITY_SCORE: 31
VISION_MANAGEMENT: GLASSES
DRESSING/BATHING_DIFFICULTY: NO
TOILETING_ISSUES: NO
FALL_HISTORY_WITHIN_LAST_SIX_MONTHS: NO
CHANGE_IN_FUNCTIONAL_STATUS_SINCE_ONSET_OF_CURRENT_ILLNESS/INJURY: NO
ADLS_ACUITY_SCORE: 31
ADLS_ACUITY_SCORE: 31

## 2023-02-19 NOTE — H&P
[facility]    History and Physical  Obstetrics and Gynecology     Date of Admission:  2023    Assessment & Plan   Beata Joseph is a 32 year old G1 at 35+6wga admitted for severe gestational hypertension     ASSESSMENT:   IUP @ 35w6d  Severe gestational hypertension in the setting of white coat hypertension. Negative protein/creatinine ratio      PLAN:   Betamethasone IM now and in 24 hours  Will start with vaginal misoprostol and move on to oxytocin once Beltran is greater than 6.   Will start oral labetalol for blood pressure control during the induction  Treat severe range hypertension   NICU consultation due to premature status.  IV magnesium for seizure prophylaxis.      Ruth Ferreira MD    History of Present Illness   Beata Joseph is a 32 year old female  35w6d  Estimated Date of Delivery: Mar 20, 2023 is calculated from Patient's last menstrual period was 2022. [facility] of [facility] for severe gestational hypertension. Her prenatal care started with the [facility]. She has been on Wellbutrin prior to pregnancy and has always been on 150mg. She never had medication induced hypertension although she has documented elevated blood pressure prior to pregnancy. She has a diagnosis of white coat hypertension. In her initial prenatal visits her blood pressures were in the 120s/80s range. She developed gestational hypertension and has been monitoring her blood pressures at home. She does unexplained tachycardia as well. She however was getting severe range hypertensive measurements at home. She brought her cuff in to the clinic and although her home cuff was slightly higher it was the same cuff being sold in the medical supply store. She has been having floaters in her vision for the past 3 weeks. Her last growth sonogram at 32 weeks was normal with an EFW at the 56th percentile. She has had more anxiety surrounding her new  "hypertension and feeling like she is a \"ticking time bomb.\"     PRENATAL COURSE  Prenatal course was complicated by as noted above      Recent Labs   Lab Test 08/25/22 0927   AS Negative     Rhogam not indicated   Recent Labs   Lab Test 08/25/22 0927   HEPBANG Nonreactive   HIAGAB Nonreactive   RUQIGG Positive         Prior to Admission Medications   Prior to Admission Medications   Prescriptions Last Dose Informant Patient Reported? Taking?   Prenat w/o K-GP-Aibufkm-FA-DHA (PNV-DHA PO)   Yes No   buPROPion (WELLBUTRIN XL) 150 MG 24 hr tablet   No No   Sig: Take 1 tablet (150 mg) by mouth every morning   doxylamine (UNISOM) 25 MG TABS tablet   Yes No   Sig: Take 25 mg by mouth nightly as needed   omeprazole (PRILOSEC) 20 MG DR capsule   No No   Sig: Take 1 capsule (20 mg) by mouth daily      Facility-Administered Medications: None     Allergies   Allergies   Allergen Reactions     Cephalosporins Nausea and Vomiting     Cats Hives     No Clinical Screening - See Comments Other (See Comments)     Pollens and animal dander     Pollen Extract      Other reaction(s): Sneezing         Immunization History   Immunization History   Administered Date(s) Administered     COVID-19 Vaccine 12+ (Pfizer) 12/21/2020, 01/07/2021, 10/07/2021     DTAP (<7y) 1991, 01/13/1992, 01/30/1992, 03/26/1992, 07/31/1997     DTaP, Unspecified 1991, 01/13/1992, 01/30/1992, 03/26/1992, 07/31/1997     FLU 6-35 months 10/04/2015     Flu, Unspecified 10/02/2019     HPV Quadrivalent 07/18/2008, 09/19/2008, 01/20/2009     Hep B, Peds or Adolescent 10/29/2003, 11/28/2003, 04/27/2004     HepB, Unspecified 10/29/2003, 11/28/2003, 04/27/2004     Historic Hib Hib-titer 1991, 01/30/1992, 03/26/1992     Influenza (H1N1) 12/18/2009     Influenza Vaccine >6 months (Alfuria,Fluzone) 10/10/2019, 10/09/2020, 09/21/2021, 09/28/2022     MMR 01/13/1993, 07/31/1997     Mantoux Tuberculin Skin Test 09/25/1992     Meningococcal ACWY (Menactra ) " 08/03/2009     OPV, trivalent, live 1991, 01/30/1992, 01/13/1993, 07/31/1997     OPV, unspecified 1991, 01/30/1992, 01/13/1993, 07/31/1997     TDAP Vaccine (Adacel) 08/23/2012     Td (Adult), Adsorbed 04/27/2004     Tdap (Adacel,Boostrix) 08/23/2012, 12/30/2022     Tdap (Adult) Unspecified Formulation 04/27/2004     Varicella 08/25/2014       Past Medical History:   Diagnosis Date     Abnormal cervical Papanicolaou smear 05/16/2018 5/16/18, 06/09/21     Cervical high risk HPV (human papillomavirus) test positive 05/16/2018 05/16/18, 05/22/2019, 07/15/20, 06/9/21     Depressive disorder 01/2020     Gastroesophageal reflux disease without esophagitis 7/22/2020     Gestational HTN, third trimester 2/12/2023     Urinary tract infection        Past Surgical History:   Procedure Laterality Date     COLPOSCOPY  8/2021     ENT SURGERY  2012    Tonsillectomy       Vitals:    02/19/23 1010 02/19/23 1020 02/19/23 1120 02/19/23 1242   BP: (!) 178/97 (!) 164/94 (!) 157/99 (!) 157/105   Resp: 14      Temp: 98.1  F (36.7  C)      TempSrc: Temporal          Abdomen: gravid, single vertex fetus, non-tender, EFW 5 lbs 10oz  SVE: 1/50/-2 mid position and firm  [place]: none  FHT: 150 bpm/ mod ricarda/ + accels/ no decels  Constitutional: healthy, alert, active and no distress   Extremities: NT, no edema  Neurologic: Awake, alert, oriented x3, reflexes 1+ bilaterally.  Neuropsychiatric: General: normal, calm and normal eye contact  Heart: Regular rate and rhythm  Lungs: clear to ausculation bilaterally    Ruth Ferreira MD

## 2023-02-19 NOTE — CARE PLAN
Patient admitted to [facility], ambulatory per services of [facility] for evaluation of hypertension during pregnancy.  Pt states she has been getting regular BPPs and NSTs due to high blood pressures. Patient denies hypertensive symptoms including headache, blurred vision, and/or epigastric pain  Discussed plan of care including EFM with NST, routine VS, and BPP.  Pt agreeable.  EFM applied and admission assessment completed.

## 2023-02-19 NOTE — PROGRESS NOTES
[facility] CNM TRIAGE NOTE    Beata Joseph is a 32 year old  with and IUP at 35w6d who presents with complaint of elevated blood pressures. She came in for BPP and BP check. initally high with tachycardia, she reports tachycardia this whole pregnancy.  Reports floaters the last few weeks, notices them on and off, wears glasses, denies new onset worsening floaters or double vision, HA, epigastric pain. Has had anxiety for a while but feels like this is not that, hoping to have a solid plan in place, wants to do what is right and wondering when we move forward with delivery. Okay with moving toward delivery if that is recommended.   Planning to name baby Sammie.    Patient states baby is active.  Denies ROM   Denies vaginal bleeding  [facility] with [facility] CNMs.     Problems this pregnancy:   Patient Active Problem List   Diagnosis     Screening for malignant neoplasm of cervix     ASCUS with positive high risk HPV cervical     Gallbladder attack     Gastroesophageal reflux disease without esophagitis     White coat syndrome without diagnosis of hypertension     Anxiety     Visual field scotoma of left eye     Mild episode of recurrent major depressive disorder (H)     Supervision of high-risk pregnancy     Hypertriglyceridemia     COVID-19 affecting pregnancy in third trimester     Gestational HTN, third trimester     Anemia affecting pregnancy in third trimester     ROS:  Patient is alert and oriented.     PHYSICAL EXAM:  BP (!) 157/99   Temp 98.1  F (36.7  C) (Temporal)   Resp 14   LMP 2022     FHT's 150 with moderate variability  Accelerations: present   Decelerations:  Absent    Lungs CTA  Heart RRR    BPP 8/8  Cephalic  No clonus        Contractions: Pt is not timo     Abdomen: gravid  Bloody show: no  Cervix:deferred to MD  Membranes are intact     ASSESSMENT :   32 year old  with barrios IUP 35w6d Gestational  Hypertension-severe range  NST  reactive  GBS negative and membranes intact    PLAN:  Admit to hospital and transfer of care to MD management  Labs ordered for preeclampsia, CBC, CMP, and PCR  Lengthy discussion about plan of care regarding severe versus mild preeclampsia, symptoms, lab crtieria, and BP range, reviewed severe range BPs >160/110 and symptoms new onset HA ad no improvement with tylenol, epigastric pain, and new onset visual changes, we discussed impact of anxiety tachycardia can falsely elevate pressure but she is borderline severe range and we can continue close monitoring, consider observation overnight, would want to be notified/start induction of labor, transfer of care and initiation of magnesium  Consult with MD on call Dr. Ferreira   Repeat /105 and plan to admit to labor and delivery for induction, see H&P for further detail     ALEJO AllenM

## 2023-02-19 NOTE — PLAN OF CARE
Report received from Brinda Medina Rn.  Patient is admitted to room 214 and the Magnesium sulfate infusion is started at 1245, Labetolol and Betamethasone is given.  The patient became nauseated from the loading dose and Zofran is given.  The patient's nausea is resolved.  A Cytotec is placed at 1330.  The blood pressures decrease to normal range from the interventions.  Dr. Ferreira is updated with the patient's status.  Dr. Ferreira orders to hold the next Cytotec and Labetalol dose.

## 2023-02-19 NOTE — PROVIDER NOTIFICATION
02/19/23 1010 02/19/23 1020   Vital Signs   Oximeter Heart Rate 125 bpm 130 bpm   BP (!) 178/97 (!) 164/94       Paged Enedina Tabor with BP results. These were taken fairly quick after patient arrived. Enedina states that the patient is very anxious and they usually wait 15 mins in clinic before taking a BP. Enedina would like to hold off treating at this time and retake BP after BPP.

## 2023-02-19 NOTE — RESULT ENCOUNTER NOTE
Results discussed directly with patient while patient was present. Any further details documented in the note.   ALEJO Allen CNM

## 2023-02-19 NOTE — PLAN OF CARE
BP rechecked after back from US  157/99.  Patient states uncomfortable going home with her BP's elevated.  Pt and  reassure and provider in department and will discuss POC.  1204-EUM/US reapplied.  And BP monitored.  Dr. Ferreira and Enedina Tabor M updated on BP.  Plan to admit pt and treat BP's.  1215-IV started.  Pt admitted to 1235 for induction of labor due to severe gestation hypertension.  Report given to MAKENNA Hart RN will assume care.  Pt and  in good spirits.  Asking appropriate questions

## 2023-02-20 ENCOUNTER — ANESTHESIA EVENT (OUTPATIENT)
Dept: OBGYN | Facility: CLINIC | Age: 33
End: 2023-02-20
Payer: COMMERCIAL

## 2023-02-20 ENCOUNTER — ANESTHESIA (OUTPATIENT)
Dept: OBGYN | Facility: CLINIC | Age: 33
End: 2023-02-20
Payer: COMMERCIAL

## 2023-02-20 LAB
HGB BLD-MCNC: 8.9 G/DL (ref 11.7–15.7)
HOLD SPECIMEN: NORMAL
MAGNESIUM SERPL-MCNC: 2.7 MG/DL (ref 1.7–2.3)
PLATELET # BLD AUTO: 264 10E3/UL (ref 150–450)
T PALLIDUM AB SER QL: NONREACTIVE

## 2023-02-20 PROCEDURE — 250N000011 HC RX IP 250 OP 636: Performed by: ANESTHESIOLOGY

## 2023-02-20 PROCEDURE — 85018 HEMOGLOBIN: CPT | Performed by: STUDENT IN AN ORGANIZED HEALTH CARE EDUCATION/TRAINING PROGRAM

## 2023-02-20 PROCEDURE — 36415 COLL VENOUS BLD VENIPUNCTURE: CPT | Performed by: STUDENT IN AN ORGANIZED HEALTH CARE EDUCATION/TRAINING PROGRAM

## 2023-02-20 PROCEDURE — 360N000076 HC SURGERY LEVEL 3, PER MIN: Performed by: OBSTETRICS & GYNECOLOGY

## 2023-02-20 PROCEDURE — 59515 CESAREAN DELIVERY: CPT | Performed by: OBSTETRICS & GYNECOLOGY

## 2023-02-20 PROCEDURE — 36415 COLL VENOUS BLD VENIPUNCTURE: CPT | Performed by: OBSTETRICS & GYNECOLOGY

## 2023-02-20 PROCEDURE — 250N000011 HC RX IP 250 OP 636

## 2023-02-20 PROCEDURE — 250N000009 HC RX 250: Performed by: ANESTHESIOLOGY

## 2023-02-20 PROCEDURE — 250N000013 HC RX MED GY IP 250 OP 250 PS 637: Performed by: OBSTETRICS & GYNECOLOGY

## 2023-02-20 PROCEDURE — 250N000009 HC RX 250: Performed by: NURSE ANESTHETIST, CERTIFIED REGISTERED

## 2023-02-20 PROCEDURE — 258N000003 HC RX IP 258 OP 636: Performed by: NURSE ANESTHETIST, CERTIFIED REGISTERED

## 2023-02-20 PROCEDURE — 250N000011 HC RX IP 250 OP 636: Performed by: OBSTETRICS & GYNECOLOGY

## 2023-02-20 PROCEDURE — 258N000003 HC RX IP 258 OP 636: Performed by: OBSTETRICS & GYNECOLOGY

## 2023-02-20 PROCEDURE — 272N000001 HC OR GENERAL SUPPLY STERILE: Performed by: OBSTETRICS & GYNECOLOGY

## 2023-02-20 PROCEDURE — 370N000017 HC ANESTHESIA TECHNICAL FEE, PER MIN: Performed by: OBSTETRICS & GYNECOLOGY

## 2023-02-20 PROCEDURE — 3E033XZ INTRODUCTION OF VASOPRESSOR INTO PERIPHERAL VEIN, PERCUTANEOUS APPROACH: ICD-10-PCS | Performed by: ANESTHESIOLOGY

## 2023-02-20 PROCEDURE — 88307 TISSUE EXAM BY PATHOLOGIST: CPT | Mod: TC | Performed by: OBSTETRICS & GYNECOLOGY

## 2023-02-20 PROCEDURE — 250N000011 HC RX IP 250 OP 636: Performed by: NURSE ANESTHETIST, CERTIFIED REGISTERED

## 2023-02-20 PROCEDURE — 120N000012 HC R&B POSTPARTUM

## 2023-02-20 PROCEDURE — 3E0R3BZ INTRODUCTION OF ANESTHETIC AGENT INTO SPINAL CANAL, PERCUTANEOUS APPROACH: ICD-10-PCS | Performed by: ANESTHESIOLOGY

## 2023-02-20 PROCEDURE — 83735 ASSAY OF MAGNESIUM: CPT | Performed by: STUDENT IN AN ORGANIZED HEALTH CARE EDUCATION/TRAINING PROGRAM

## 2023-02-20 PROCEDURE — 85049 AUTOMATED PLATELET COUNT: CPT | Performed by: OBSTETRICS & GYNECOLOGY

## 2023-02-20 PROCEDURE — 00HU33Z INSERTION OF INFUSION DEVICE INTO SPINAL CANAL, PERCUTANEOUS APPROACH: ICD-10-PCS | Performed by: ANESTHESIOLOGY

## 2023-02-20 PROCEDURE — 258N000003 HC RX IP 258 OP 636: Performed by: ANESTHESIOLOGY

## 2023-02-20 PROCEDURE — 710N000009 HC RECOVERY PHASE 1, LEVEL 1, PER MIN: Performed by: OBSTETRICS & GYNECOLOGY

## 2023-02-20 RX ORDER — ONDANSETRON 2 MG/ML
4 INJECTION INTRAMUSCULAR; INTRAVENOUS EVERY 6 HOURS PRN
Status: DISCONTINUED | OUTPATIENT
Start: 2023-02-20 | End: 2023-02-22 | Stop reason: HOSPADM

## 2023-02-20 RX ORDER — DIPHENHYDRAMINE HCL 25 MG
25 CAPSULE ORAL EVERY 6 HOURS PRN
Status: DISCONTINUED | OUTPATIENT
Start: 2023-02-20 | End: 2023-02-22 | Stop reason: HOSPADM

## 2023-02-20 RX ORDER — NALOXONE HYDROCHLORIDE 0.4 MG/ML
0.2 INJECTION, SOLUTION INTRAMUSCULAR; INTRAVENOUS; SUBCUTANEOUS
Status: DISCONTINUED | OUTPATIENT
Start: 2023-02-20 | End: 2023-02-22 | Stop reason: HOSPADM

## 2023-02-20 RX ORDER — ROPIVACAINE HYDROCHLORIDE 2 MG/ML
10 INJECTION, SOLUTION EPIDURAL; INFILTRATION; PERINEURAL ONCE
Status: DISCONTINUED | OUTPATIENT
Start: 2023-02-20 | End: 2023-02-22 | Stop reason: HOSPADM

## 2023-02-20 RX ORDER — ONDANSETRON 4 MG/1
4 TABLET, ORALLY DISINTEGRATING ORAL EVERY 6 HOURS PRN
Status: DISCONTINUED | OUTPATIENT
Start: 2023-02-20 | End: 2023-02-20

## 2023-02-20 RX ORDER — MISOPROSTOL 200 UG/1
800 TABLET ORAL
Status: DISCONTINUED | OUTPATIENT
Start: 2023-02-20 | End: 2023-02-20

## 2023-02-20 RX ORDER — NALBUPHINE HYDROCHLORIDE 10 MG/ML
2.5-5 INJECTION, SOLUTION INTRAMUSCULAR; INTRAVENOUS; SUBCUTANEOUS EVERY 6 HOURS PRN
Status: DISCONTINUED | OUTPATIENT
Start: 2023-02-20 | End: 2023-02-20

## 2023-02-20 RX ORDER — MISOPROSTOL 200 UG/1
800 TABLET ORAL
Status: DISCONTINUED | OUTPATIENT
Start: 2023-02-20 | End: 2023-02-22 | Stop reason: HOSPADM

## 2023-02-20 RX ORDER — ACETAMINOPHEN 325 MG/1
650 TABLET ORAL EVERY 4 HOURS PRN
Status: DISCONTINUED | OUTPATIENT
Start: 2023-02-23 | End: 2023-02-22 | Stop reason: HOSPADM

## 2023-02-20 RX ORDER — OXYTOCIN/0.9 % SODIUM CHLORIDE 30/500 ML
100-340 PLASTIC BAG, INJECTION (ML) INTRAVENOUS CONTINUOUS PRN
Status: CANCELLED | OUTPATIENT
Start: 2023-02-20

## 2023-02-20 RX ORDER — METOCLOPRAMIDE 10 MG/1
10 TABLET ORAL EVERY 6 HOURS PRN
Status: DISCONTINUED | OUTPATIENT
Start: 2023-02-20 | End: 2023-02-22 | Stop reason: HOSPADM

## 2023-02-20 RX ORDER — DIPHENHYDRAMINE HYDROCHLORIDE 50 MG/ML
25 INJECTION INTRAMUSCULAR; INTRAVENOUS EVERY 6 HOURS PRN
Status: DISCONTINUED | OUTPATIENT
Start: 2023-02-20 | End: 2023-02-22 | Stop reason: HOSPADM

## 2023-02-20 RX ORDER — BISACODYL 10 MG
10 SUPPOSITORY, RECTAL RECTAL DAILY PRN
Status: DISCONTINUED | OUTPATIENT
Start: 2023-02-22 | End: 2023-02-22 | Stop reason: HOSPADM

## 2023-02-20 RX ORDER — ONDANSETRON 2 MG/ML
4 INJECTION INTRAMUSCULAR; INTRAVENOUS EVERY 6 HOURS PRN
Status: DISCONTINUED | OUTPATIENT
Start: 2023-02-20 | End: 2023-02-20 | Stop reason: HOSPADM

## 2023-02-20 RX ORDER — HYDROMORPHONE HCL IN WATER/PF 6 MG/30 ML
PATIENT CONTROLLED ANALGESIA SYRINGE INTRAVENOUS
Status: COMPLETED
Start: 2023-02-20 | End: 2023-02-20

## 2023-02-20 RX ORDER — FENTANYL CITRATE-0.9 % NACL/PF 10 MCG/ML
200 PLASTIC BAG, INJECTION (ML) INTRAVENOUS ONCE
Status: COMPLETED | OUTPATIENT
Start: 2023-02-20 | End: 2023-02-20

## 2023-02-20 RX ORDER — ONDANSETRON 2 MG/ML
INJECTION INTRAMUSCULAR; INTRAVENOUS PRN
Status: DISCONTINUED | OUTPATIENT
Start: 2023-02-20 | End: 2023-02-20

## 2023-02-20 RX ORDER — HYDROMORPHONE HYDROCHLORIDE 1 MG/ML
.3-.5 INJECTION, SOLUTION INTRAMUSCULAR; INTRAVENOUS; SUBCUTANEOUS EVERY 30 MIN PRN
Status: DISCONTINUED | OUTPATIENT
Start: 2023-02-20 | End: 2023-02-22 | Stop reason: HOSPADM

## 2023-02-20 RX ORDER — PROCHLORPERAZINE 25 MG
25 SUPPOSITORY, RECTAL RECTAL EVERY 12 HOURS PRN
Status: DISCONTINUED | OUTPATIENT
Start: 2023-02-20 | End: 2023-02-22 | Stop reason: HOSPADM

## 2023-02-20 RX ORDER — FENTANYL CITRATE-0.9 % NACL/PF 10 MCG/ML
100 PLASTIC BAG, INJECTION (ML) INTRAVENOUS EVERY 5 MIN PRN
Status: COMPLETED | OUTPATIENT
Start: 2023-02-20 | End: 2023-02-20

## 2023-02-20 RX ORDER — OXYCODONE HYDROCHLORIDE 5 MG/1
5 TABLET ORAL EVERY 4 HOURS PRN
Status: DISCONTINUED | OUTPATIENT
Start: 2023-02-20 | End: 2023-02-22 | Stop reason: HOSPADM

## 2023-02-20 RX ORDER — METOCLOPRAMIDE HYDROCHLORIDE 5 MG/ML
10 INJECTION INTRAMUSCULAR; INTRAVENOUS EVERY 6 HOURS PRN
Status: DISCONTINUED | OUTPATIENT
Start: 2023-02-20 | End: 2023-02-22 | Stop reason: HOSPADM

## 2023-02-20 RX ORDER — KETOROLAC TROMETHAMINE 30 MG/ML
INJECTION, SOLUTION INTRAMUSCULAR; INTRAVENOUS
Status: DISCONTINUED
Start: 2023-02-20 | End: 2023-02-20 | Stop reason: HOSPADM

## 2023-02-20 RX ORDER — OXYTOCIN 10 [USP'U]/ML
10 INJECTION, SOLUTION INTRAMUSCULAR; INTRAVENOUS
Status: DISCONTINUED | OUTPATIENT
Start: 2023-02-20 | End: 2023-02-20

## 2023-02-20 RX ORDER — ONDANSETRON 2 MG/ML
4 INJECTION INTRAMUSCULAR; INTRAVENOUS EVERY 6 HOURS PRN
Status: DISCONTINUED | OUTPATIENT
Start: 2023-02-20 | End: 2023-02-20

## 2023-02-20 RX ORDER — EPHEDRINE SULFATE 50 MG/ML
5 INJECTION, SOLUTION INTRAMUSCULAR; INTRAVENOUS; SUBCUTANEOUS
Status: DISCONTINUED | OUTPATIENT
Start: 2023-02-20 | End: 2023-02-22 | Stop reason: HOSPADM

## 2023-02-20 RX ORDER — IBUPROFEN 400 MG/1
800 TABLET, FILM COATED ORAL EVERY 6 HOURS PRN
Status: DISCONTINUED | OUTPATIENT
Start: 2023-02-21 | End: 2023-02-22 | Stop reason: HOSPADM

## 2023-02-20 RX ORDER — TRANEXAMIC ACID 10 MG/ML
1 INJECTION, SOLUTION INTRAVENOUS EVERY 30 MIN PRN
Status: DISCONTINUED | OUTPATIENT
Start: 2023-02-20 | End: 2023-02-20

## 2023-02-20 RX ORDER — NALOXONE HYDROCHLORIDE 0.4 MG/ML
0.4 INJECTION, SOLUTION INTRAMUSCULAR; INTRAVENOUS; SUBCUTANEOUS
Status: DISCONTINUED | OUTPATIENT
Start: 2023-02-20 | End: 2023-02-22 | Stop reason: HOSPADM

## 2023-02-20 RX ORDER — CARBOPROST TROMETHAMINE 250 UG/ML
250 INJECTION, SOLUTION INTRAMUSCULAR
Status: DISCONTINUED | OUTPATIENT
Start: 2023-02-20 | End: 2023-02-20

## 2023-02-20 RX ORDER — MISOPROSTOL 200 UG/1
400 TABLET ORAL
Status: DISCONTINUED | OUTPATIENT
Start: 2023-02-20 | End: 2023-02-20

## 2023-02-20 RX ORDER — MODIFIED LANOLIN
OINTMENT (GRAM) TOPICAL
Status: DISCONTINUED | OUTPATIENT
Start: 2023-02-20 | End: 2023-02-22 | Stop reason: HOSPADM

## 2023-02-20 RX ORDER — ONDANSETRON 4 MG/1
4 TABLET, ORALLY DISINTEGRATING ORAL EVERY 6 HOURS PRN
Status: DISCONTINUED | OUTPATIENT
Start: 2023-02-20 | End: 2023-02-22 | Stop reason: HOSPADM

## 2023-02-20 RX ORDER — AMOXICILLIN 250 MG
1 CAPSULE ORAL 2 TIMES DAILY
Status: DISCONTINUED | OUTPATIENT
Start: 2023-02-20 | End: 2023-02-22 | Stop reason: HOSPADM

## 2023-02-20 RX ORDER — AMOXICILLIN 250 MG
2 CAPSULE ORAL 2 TIMES DAILY
Status: DISCONTINUED | OUTPATIENT
Start: 2023-02-20 | End: 2023-02-22 | Stop reason: HOSPADM

## 2023-02-20 RX ORDER — OXYTOCIN 10 [USP'U]/ML
10 INJECTION, SOLUTION INTRAMUSCULAR; INTRAVENOUS
Status: CANCELLED | OUTPATIENT
Start: 2023-02-20

## 2023-02-20 RX ORDER — BUPROPION HYDROCHLORIDE 150 MG/1
150 TABLET ORAL EVERY MORNING
Status: DISCONTINUED | OUTPATIENT
Start: 2023-02-20 | End: 2023-02-22 | Stop reason: HOSPADM

## 2023-02-20 RX ORDER — FENTANYL CITRATE-0.9 % NACL/PF 10 MCG/ML
100 PLASTIC BAG, INJECTION (ML) INTRAVENOUS EVERY 5 MIN PRN
Status: DISCONTINUED | OUTPATIENT
Start: 2023-02-20 | End: 2023-02-20 | Stop reason: HOSPADM

## 2023-02-20 RX ORDER — AZITHROMYCIN 500 MG/1
500 INJECTION, POWDER, LYOPHILIZED, FOR SOLUTION INTRAVENOUS
Status: COMPLETED | OUTPATIENT
Start: 2023-02-20 | End: 2023-02-20

## 2023-02-20 RX ORDER — LIDOCAINE HCL/EPINEPHRINE/PF 2%-1:200K
VIAL (ML) INJECTION PRN
Status: DISCONTINUED | OUTPATIENT
Start: 2023-02-20 | End: 2023-02-20

## 2023-02-20 RX ORDER — CITRIC ACID/SODIUM CITRATE 334-500MG
30 SOLUTION, ORAL ORAL
Status: COMPLETED | OUTPATIENT
Start: 2023-02-20 | End: 2023-02-20

## 2023-02-20 RX ORDER — METHYLERGONOVINE MALEATE 0.2 MG/ML
200 INJECTION INTRAVENOUS
Status: DISCONTINUED | OUTPATIENT
Start: 2023-02-20 | End: 2023-02-20

## 2023-02-20 RX ORDER — NALBUPHINE HYDROCHLORIDE 10 MG/ML
2.5-5 INJECTION, SOLUTION INTRAMUSCULAR; INTRAVENOUS; SUBCUTANEOUS EVERY 6 HOURS PRN
Status: DISCONTINUED | OUTPATIENT
Start: 2023-02-20 | End: 2023-02-22 | Stop reason: HOSPADM

## 2023-02-20 RX ORDER — LIDOCAINE 40 MG/G
CREAM TOPICAL
Status: DISCONTINUED | OUTPATIENT
Start: 2023-02-20 | End: 2023-02-22 | Stop reason: HOSPADM

## 2023-02-20 RX ORDER — OXYTOCIN/0.9 % SODIUM CHLORIDE 30/500 ML
340 PLASTIC BAG, INJECTION (ML) INTRAVENOUS CONTINUOUS PRN
Status: DISCONTINUED | OUTPATIENT
Start: 2023-02-20 | End: 2023-02-22 | Stop reason: HOSPADM

## 2023-02-20 RX ORDER — OXYTOCIN/0.9 % SODIUM CHLORIDE 30/500 ML
PLASTIC BAG, INJECTION (ML) INTRAVENOUS CONTINUOUS PRN
Status: DISCONTINUED | OUTPATIENT
Start: 2023-02-20 | End: 2023-02-20

## 2023-02-20 RX ORDER — CEFAZOLIN SODIUM 2 G/100ML
2 INJECTION, SOLUTION INTRAVENOUS
Status: COMPLETED | OUTPATIENT
Start: 2023-02-20 | End: 2023-02-20

## 2023-02-20 RX ORDER — HYDROCORTISONE 25 MG/G
CREAM TOPICAL 3 TIMES DAILY PRN
Status: DISCONTINUED | OUTPATIENT
Start: 2023-02-20 | End: 2023-02-22 | Stop reason: HOSPADM

## 2023-02-20 RX ORDER — ACETAMINOPHEN 325 MG/1
975 TABLET ORAL ONCE
Status: COMPLETED | OUTPATIENT
Start: 2023-02-20 | End: 2023-02-20

## 2023-02-20 RX ORDER — OXYTOCIN/0.9 % SODIUM CHLORIDE 30/500 ML
340 PLASTIC BAG, INJECTION (ML) INTRAVENOUS CONTINUOUS PRN
Status: DISCONTINUED | OUTPATIENT
Start: 2023-02-20 | End: 2023-02-20

## 2023-02-20 RX ORDER — SIMETHICONE 80 MG
80 TABLET,CHEWABLE ORAL 4 TIMES DAILY PRN
Status: DISCONTINUED | OUTPATIENT
Start: 2023-02-20 | End: 2023-02-22 | Stop reason: HOSPADM

## 2023-02-20 RX ORDER — SODIUM CHLORIDE, SODIUM LACTATE, POTASSIUM CHLORIDE, CALCIUM CHLORIDE 600; 310; 30; 20 MG/100ML; MG/100ML; MG/100ML; MG/100ML
INJECTION, SOLUTION INTRAVENOUS CONTINUOUS
Status: DISCONTINUED | OUTPATIENT
Start: 2023-02-20 | End: 2023-02-20

## 2023-02-20 RX ORDER — DEXTROSE, SODIUM CHLORIDE, SODIUM LACTATE, POTASSIUM CHLORIDE, AND CALCIUM CHLORIDE 5; .6; .31; .03; .02 G/100ML; G/100ML; G/100ML; G/100ML; G/100ML
INJECTION, SOLUTION INTRAVENOUS CONTINUOUS
Status: DISCONTINUED | OUTPATIENT
Start: 2023-02-20 | End: 2023-02-22 | Stop reason: HOSPADM

## 2023-02-20 RX ORDER — PANTOPRAZOLE SODIUM 40 MG/1
40 TABLET, DELAYED RELEASE ORAL DAILY
Status: DISCONTINUED | OUTPATIENT
Start: 2023-02-20 | End: 2023-02-22 | Stop reason: HOSPADM

## 2023-02-20 RX ORDER — ONDANSETRON 4 MG/1
4 TABLET, ORALLY DISINTEGRATING ORAL EVERY 6 HOURS PRN
Status: DISCONTINUED | OUTPATIENT
Start: 2023-02-20 | End: 2023-02-20 | Stop reason: HOSPADM

## 2023-02-20 RX ORDER — MORPHINE SULFATE 1 MG/ML
INJECTION, SOLUTION EPIDURAL; INTRATHECAL; INTRAVENOUS PRN
Status: DISCONTINUED | OUTPATIENT
Start: 2023-02-20 | End: 2023-02-20

## 2023-02-20 RX ORDER — CEFAZOLIN SODIUM 2 G/100ML
2 INJECTION, SOLUTION INTRAVENOUS SEE ADMIN INSTRUCTIONS
Status: DISCONTINUED | OUTPATIENT
Start: 2023-02-20 | End: 2023-02-20

## 2023-02-20 RX ORDER — ACETAMINOPHEN 325 MG/1
975 TABLET ORAL EVERY 6 HOURS
Status: DISCONTINUED | OUTPATIENT
Start: 2023-02-20 | End: 2023-02-22 | Stop reason: HOSPADM

## 2023-02-20 RX ORDER — PRENATAL VIT/IRON FUM/FOLIC AC 27MG-0.8MG
1 TABLET ORAL DAILY
Status: DISCONTINUED | OUTPATIENT
Start: 2023-02-20 | End: 2023-02-22 | Stop reason: HOSPADM

## 2023-02-20 RX ORDER — TRANEXAMIC ACID 10 MG/ML
1 INJECTION, SOLUTION INTRAVENOUS EVERY 30 MIN PRN
Status: DISCONTINUED | OUTPATIENT
Start: 2023-02-20 | End: 2023-02-22 | Stop reason: HOSPADM

## 2023-02-20 RX ORDER — MISOPROSTOL 200 UG/1
400 TABLET ORAL
Status: DISCONTINUED | OUTPATIENT
Start: 2023-02-20 | End: 2023-02-22 | Stop reason: HOSPADM

## 2023-02-20 RX ORDER — METHYLERGONOVINE MALEATE 0.2 MG/ML
200 INJECTION INTRAVENOUS
Status: DISCONTINUED | OUTPATIENT
Start: 2023-02-20 | End: 2023-02-22 | Stop reason: HOSPADM

## 2023-02-20 RX ORDER — OXYTOCIN 10 [USP'U]/ML
10 INJECTION, SOLUTION INTRAMUSCULAR; INTRAVENOUS
Status: DISCONTINUED | OUTPATIENT
Start: 2023-02-20 | End: 2023-02-22 | Stop reason: HOSPADM

## 2023-02-20 RX ORDER — PROCHLORPERAZINE MALEATE 10 MG
10 TABLET ORAL EVERY 6 HOURS PRN
Status: DISCONTINUED | OUTPATIENT
Start: 2023-02-20 | End: 2023-02-22 | Stop reason: HOSPADM

## 2023-02-20 RX ORDER — KETOROLAC TROMETHAMINE 30 MG/ML
30 INJECTION, SOLUTION INTRAMUSCULAR; INTRAVENOUS EVERY 6 HOURS
Status: COMPLETED | OUTPATIENT
Start: 2023-02-20 | End: 2023-02-21

## 2023-02-20 RX ORDER — CARBOPROST TROMETHAMINE 250 UG/ML
250 INJECTION, SOLUTION INTRAMUSCULAR
Status: DISCONTINUED | OUTPATIENT
Start: 2023-02-20 | End: 2023-02-22 | Stop reason: HOSPADM

## 2023-02-20 RX ORDER — ROPIVACAINE HYDROCHLORIDE 2 MG/ML
INJECTION, SOLUTION EPIDURAL; INFILTRATION; PERINEURAL
Status: COMPLETED | OUTPATIENT
Start: 2023-02-20 | End: 2023-02-20

## 2023-02-20 RX ADMIN — MISOPROSTOL 25 MCG: 100 TABLET ORAL at 00:25

## 2023-02-20 RX ADMIN — MORPHINE SULFATE 3 MG: 1 INJECTION, SOLUTION EPIDURAL; INTRATHECAL; INTRAVENOUS at 07:47

## 2023-02-20 RX ADMIN — PHENYLEPHRINE HYDROCHLORIDE 200 MCG: 10 INJECTION INTRAVENOUS at 07:46

## 2023-02-20 RX ADMIN — SENNOSIDES AND DOCUSATE SODIUM 1 TABLET: 50; 8.6 TABLET ORAL at 21:16

## 2023-02-20 RX ADMIN — SODIUM CITRATE AND CITRIC ACID MONOHYDRATE 30 ML: 500; 334 SOLUTION ORAL at 06:56

## 2023-02-20 RX ADMIN — ROPIVACAINE HYDROCHLORIDE 10 ML: 2 INJECTION, SOLUTION EPIDURAL; INFILTRATION at 05:21

## 2023-02-20 RX ADMIN — KETOROLAC TROMETHAMINE 30 MG: 30 INJECTION, SOLUTION INTRAMUSCULAR at 14:57

## 2023-02-20 RX ADMIN — SODIUM CHLORIDE, POTASSIUM CHLORIDE, SODIUM LACTATE AND CALCIUM CHLORIDE 1000 ML: 600; 310; 30; 20 INJECTION, SOLUTION INTRAVENOUS at 05:29

## 2023-02-20 RX ADMIN — Medication 100 MCG: at 05:59

## 2023-02-20 RX ADMIN — SODIUM CHLORIDE, POTASSIUM CHLORIDE, SODIUM LACTATE AND CALCIUM CHLORIDE 75 ML/HR: 600; 310; 30; 20 INJECTION, SOLUTION INTRAVENOUS at 00:30

## 2023-02-20 RX ADMIN — PHENYLEPHRINE HYDROCHLORIDE 0.5 MCG/KG/MIN: 10 INJECTION INTRAVENOUS at 07:30

## 2023-02-20 RX ADMIN — SODIUM CHLORIDE, SODIUM LACTATE, POTASSIUM CHLORIDE, CALCIUM CHLORIDE AND DEXTROSE MONOHYDRATE: 5; 600; 310; 30; 20 INJECTION, SOLUTION INTRAVENOUS at 13:59

## 2023-02-20 RX ADMIN — EPHEDRINE SULFATE 5 MG: 5 INJECTION INTRAVENOUS at 05:23

## 2023-02-20 RX ADMIN — Medication 100 MCG: at 06:31

## 2023-02-20 RX ADMIN — Medication: at 05:30

## 2023-02-20 RX ADMIN — AZITHROMYCIN MONOHYDRATE 500 MG: 500 INJECTION, POWDER, LYOPHILIZED, FOR SOLUTION INTRAVENOUS at 07:15

## 2023-02-20 RX ADMIN — SODIUM CHLORIDE, POTASSIUM CHLORIDE, SODIUM LACTATE AND CALCIUM CHLORIDE 500 ML: 600; 310; 30; 20 INJECTION, SOLUTION INTRAVENOUS at 04:14

## 2023-02-20 RX ADMIN — KETOROLAC TROMETHAMINE 30 MG: 30 INJECTION, SOLUTION INTRAMUSCULAR at 21:17

## 2023-02-20 RX ADMIN — PHENYLEPHRINE HYDROCHLORIDE 200 MCG: 10 INJECTION INTRAVENOUS at 07:39

## 2023-02-20 RX ADMIN — ONDANSETRON 4 MG: 2 INJECTION INTRAMUSCULAR; INTRAVENOUS at 07:51

## 2023-02-20 RX ADMIN — SODIUM CHLORIDE, POTASSIUM CHLORIDE, SODIUM LACTATE AND CALCIUM CHLORIDE: 600; 310; 30; 20 INJECTION, SOLUTION INTRAVENOUS at 06:48

## 2023-02-20 RX ADMIN — LIDOCAINE HYDROCHLORIDE,EPINEPHRINE BITARTRATE 5 ML: 20; .005 INJECTION, SOLUTION EPIDURAL; INFILTRATION; INTRACAUDAL; PERINEURAL at 07:26

## 2023-02-20 RX ADMIN — Medication 100 MCG: at 05:39

## 2023-02-20 RX ADMIN — EPHEDRINE SULFATE 5 MG: 5 INJECTION INTRAVENOUS at 05:20

## 2023-02-20 RX ADMIN — PHENYLEPHRINE HYDROCHLORIDE 200 MCG: 10 INJECTION INTRAVENOUS at 07:48

## 2023-02-20 RX ADMIN — ACETAMINOPHEN 975 MG: 325 TABLET, FILM COATED ORAL at 06:56

## 2023-02-20 RX ADMIN — Medication 200 MCG: at 05:34

## 2023-02-20 RX ADMIN — EPHEDRINE SULFATE 5 MG: 5 INJECTION INTRAVENOUS at 05:10

## 2023-02-20 RX ADMIN — HYDROMORPHONE HYDROCHLORIDE 0.4 MG: 0.2 INJECTION, SOLUTION INTRAMUSCULAR; INTRAVENOUS; SUBCUTANEOUS at 09:05

## 2023-02-20 RX ADMIN — PHENYLEPHRINE HYDROCHLORIDE 100 MCG: 10 INJECTION INTRAVENOUS at 07:59

## 2023-02-20 RX ADMIN — KETOROLAC TROMETHAMINE 30 MG: 30 INJECTION, SOLUTION INTRAMUSCULAR at 09:30

## 2023-02-20 RX ADMIN — CEFAZOLIN SODIUM 2 G: 2 INJECTION, SOLUTION INTRAVENOUS at 07:26

## 2023-02-20 RX ADMIN — PHENYLEPHRINE HYDROCHLORIDE 200 MCG: 10 INJECTION INTRAVENOUS at 07:43

## 2023-02-20 RX ADMIN — SENNOSIDES AND DOCUSATE SODIUM 2 TABLET: 50; 8.6 TABLET ORAL at 11:34

## 2023-02-20 RX ADMIN — LIDOCAINE HYDROCHLORIDE,EPINEPHRINE BITARTRATE 5 ML: 20; .005 INJECTION, SOLUTION EPIDURAL; INFILTRATION; INTRACAUDAL; PERINEURAL at 07:19

## 2023-02-20 RX ADMIN — ACETAMINOPHEN 975 MG: 325 TABLET, FILM COATED ORAL at 18:23

## 2023-02-20 RX ADMIN — BUPROPION HYDROCHLORIDE 150 MG: 150 TABLET, FILM COATED, EXTENDED RELEASE ORAL at 13:05

## 2023-02-20 RX ADMIN — LABETALOL HYDROCHLORIDE 200 MG: 200 TABLET, FILM COATED ORAL at 00:36

## 2023-02-20 RX ADMIN — ACETAMINOPHEN 975 MG: 325 TABLET, FILM COATED ORAL at 11:34

## 2023-02-20 RX ADMIN — Medication 340 ML/HR: at 07:39

## 2023-02-20 RX ADMIN — Medication 100 MCG: at 05:29

## 2023-02-20 RX ADMIN — SODIUM CHLORIDE, POTASSIUM CHLORIDE, SODIUM LACTATE AND CALCIUM CHLORIDE: 600; 310; 30; 20 INJECTION, SOLUTION INTRAVENOUS at 07:52

## 2023-02-20 RX ADMIN — HYDROXYZINE HYDROCHLORIDE 50 MG: 25 TABLET, FILM COATED ORAL at 02:50

## 2023-02-20 RX ADMIN — LIDOCAINE HYDROCHLORIDE,EPINEPHRINE BITARTRATE 5 ML: 20; .005 INJECTION, SOLUTION EPIDURAL; INFILTRATION; INTRACAUDAL; PERINEURAL at 07:22

## 2023-02-20 ASSESSMENT — ACTIVITIES OF DAILY LIVING (ADL)
ADLS_ACUITY_SCORE: 21
ADLS_ACUITY_SCORE: 20
ADLS_ACUITY_SCORE: 21
ADLS_ACUITY_SCORE: 21
ADLS_ACUITY_SCORE: 20
ADLS_ACUITY_SCORE: 24
ADLS_ACUITY_SCORE: 20
ADLS_ACUITY_SCORE: 21
ADLS_ACUITY_SCORE: 20

## 2023-02-20 NOTE — ANESTHESIA PROCEDURE NOTES
Epidural catheter Procedure Note    Pre-Procedure   Staff -        Anesthesiologist:  Gorge Onofre MD       Performed By: anesthesiologist       Location: OB       Pre-Anesthestic Checklist: patient identified, IV checked, risks and benefits discussed, informed consent, monitors and equipment checked, pre-op evaluation and at physician/surgeon's request  Timeout:       Correct Patient: Yes        Correct Procedure: Yes        Correct Site: Yes        Correct Position: Yes   Procedure Documentation  Procedure: epidural catheter       Patient Position: sitting       Patient Prep/Sterile Barriers: sterile gloves, mask, patient draped       Skin prep: Betadine       Local skin infiltrated with mL of 1% lidocaine.        Insertion Site: L4-5. (midline approach).       Technique: LORT saline        INNA at 5 cm.       Needle Type: [employer] needle       Needle Gauge: 17.        Needle Length (Inches): 3.5           Catheter threaded easily.           Threaded 10 cm at skin.         # of attempts: 1 and  # of redirects:     Assessment/Narrative         Paresthesias: No.       Test dose of 3 mL lidocaine 1.5% w/ 1:200,000 epinephrine at.         Test dose negative, 3 minutes after injection, for signs of intravascular, subdural, or intrathecal injection.       Insertion/Infusion Method: LORT saline       Aspiration negative for Heme or CSF via Epidural Catheter.    Medication(s) Administered   0.2% Ropivacaine (Epidural) - EPIDURAL   10 mL - 2/20/2023 5:21:00 AM   Comments:  No complications.  Catheter secured with sterile dressing and tape.  Questions answered.    Orders to manage the epidural infusion have been entered and, through coordination with the nurse, we will continue to manage and monitor the patient's labor epidural.  We will continuously be available to adjust as needed throughout the entire labor and delivery process.        FOR [facility] ([facility]) ONLY:   Pain Team Contact information: please page the Pain  "Team Via [facility]. Search \"Pain\". During daytime hours, please page the attending first. At night please page the resident first.    "

## 2023-02-20 NOTE — PLAN OF CARE
Patient taking Tylenol and Ibuprofen for pain with adequate pain relief. Patient ambulated to bathroom, tolerated well. Patient breastfeeding infant with nipple shield. Encouraged patient to call with needs/questions. Call light within reach, will continue to monitor.

## 2023-02-20 NOTE — PLAN OF CARE
Dr. Ferreira is updated with patient's cervical check, Beltran score, BP's, PIH checks.  Dr. Ferreira wants to restart the Cytotec administration due to the BP's increasing.  Report given to Malinda Mcdowell RN and she assumes cares.

## 2023-02-20 NOTE — PLAN OF CARE
Report from Reji Montilla at 1200. Feels well.  Denies pain. Vitals stable. Incision has old drainage. Herring draining concentrated urine. Tolerating clear fluids.  Working on breast  feeding- inverted nipples - using shield. Will continue to monitor.

## 2023-02-20 NOTE — PROGRESS NOTES
"[facility] Labor and Delivery Progress Note    Beata Joseph MRN# [id number]   Age: 32 year old YOB: 1990           Subjective:   Called to evaluate at the bedside due to recurrent late decelerations with contractions after hypotension after the epidural. She has received 4 doses of ephedrine and 1500 ml of fluid.            Objective:   Patient Vitals for the past 24 hrs:   BP Temp Temp src Pulse Resp Height Weight Oximeter Heart Rate   02/20/23 0552 106/58 -- -- -- -- -- -- 103 bpm   02/20/23 0550 110/61 -- -- -- -- -- -- 95 bpm   02/20/23 0549 109/55 -- -- -- -- -- -- 108 bpm   02/20/23 0546 111/59 -- -- -- -- -- -- 100 bpm   02/20/23 0544 107/59 -- -- -- -- -- -- 98 bpm   02/20/23 0542 109/58 -- -- -- -- -- -- 93 bpm   02/20/23 0540 106/57 -- -- -- -- -- -- 105 bpm   02/20/23 0538 107/60 -- -- -- -- -- -- 103 bpm   02/20/23 0536 107/63 -- -- -- -- -- -- 96 bpm   02/20/23 0535 102/59 -- -- -- -- -- -- 90 bpm   02/20/23 0532 (!) 80/47 -- -- -- -- -- -- 93 bpm   02/20/23 0530 (!) 79/40 -- -- -- -- -- -- 105 bpm   02/20/23 0528 (!) 79/42 -- -- -- -- -- -- 93 bpm   02/20/23 0526 (!) 76/44 -- -- -- -- -- -- 109 bpm   02/20/23 0524 (!) 72/36 -- -- -- -- -- -- 102 bpm   02/20/23 0522 (!) 76/38 -- -- -- -- -- -- 90 bpm   02/20/23 0520 (!) 76/38 -- -- -- -- -- -- 87 bpm   02/20/23 0518 (!) 82/45 -- -- -- -- -- -- 94 bpm   02/20/23 0515 121/55 -- -- -- -- -- -- 104 bpm   02/20/23 0513 99/55 -- -- -- -- -- -- 75 bpm   02/20/23 0405 119/59 -- -- -- -- -- -- 94 bpm   02/20/23 0036 106/55 -- -- 113 -- -- -- --   02/20/23 0025 106/55 98.5  F (36.9  C) Temporal -- 16 -- -- 113 bpm   02/19/23 2225 133/73 98.2  F (36.8  C) Temporal -- 18 -- -- 112 bpm   02/19/23 2100 -- -- -- -- -- 1.753 m (5' 9\") -- --   02/19/23 2020 136/78 -- -- -- -- -- -- 104 bpm   02/19/23 1950 131/82 97.7  F (36.5  C) Temporal -- 18 -- -- 115 bpm   02/19/23 1859 137/82 98.1  F (36.7  C) Temporal -- -- -- -- 121 bpm   02/19/23 1747 " 128/75 -- -- -- -- -- -- 100 bpm   23 1640 114/71 98.4  F (36.9  C) Temporal -- -- -- -- 99 bpm   23 1532 124/72 -- -- -- -- -- -- 95 bpm   23 1454 124/75 -- -- -- -- -- -- 99 bpm   23 1447 -- -- -- -- -- -- 89.8 kg (198 lb) --   23 1352 108/62 -- -- -- -- -- -- 82 bpm   23 1321 124/62 -- -- -- -- -- -- 86 bpm   23 1316 105/57 -- -- -- -- -- -- 99 bpm   23 1311 127/79 -- -- -- -- -- -- 93 bpm   23 1306 127/79 -- -- -- -- -- -- 94 bpm   23 1303 135/88 -- -- -- -- -- -- 90 bpm   23 1257 128/86 -- -- -- -- -- -- 100 bpm   23 1256 128/86 -- -- -- -- -- -- 100 bpm   23 1250 131/88 -- -- -- -- -- -- 103 bpm   23 1242 (!) 157/105 -- -- -- -- -- -- --   23 1220 (!) 158/105 -- -- -- -- -- -- 92 bpm   23 1205 (!) 167/105 -- -- -- -- -- -- 94 bpm   23 1120 (!) 157/99 -- -- -- -- -- -- 116 bpm   23 1020 (!) 164/94 -- -- -- -- -- -- 130 bpm   23 1010 (!) 178/97 98.1  F (36.7  C) Temporal -- 14 -- -- 125 bpm         Cervical Exam: 3 / 80% / -1      Position: Mid    Membranes: Intact     Fetal Heart Rate:    Monitor: external US, other (see comments) (loss of capture - adjusted)    Variability: minimal variability (amplitude range 6 to 25 bpm)    Baseline Rate: normal range    Fetal Heart Rate Tracinbpm. Recurrent late decelerations. No accelerations. No response to scalp stimulation          Assessment:   Beata Joseph is a 32 year old  who is 36w0d here with severe gestational hypertension now with fetal intolerance to labor after refractory hypotension following the epidural.          Plan:   Beata was counseled about my recommendation for a primary  section as we are remote from delivery. The risks of the procedure were discussed with her and she consents to proceed.     Ruth Ferreira MD

## 2023-02-20 NOTE — ANESTHESIA POSTPROCEDURE EVALUATION
Patient: Beata Joseph    Procedure: Procedure(s):   SECTION       Anesthesia Type:  Epidural    Note:  Disposition: Inpatient   Postop Pain Control: Uneventful            Sign Out: Well controlled pain   PONV: No   Neuro/Psych: Uneventful            Sign Out: Acceptable/Baseline neuro status   Airway/Respiratory: Uneventful            Sign Out: Acceptable/Baseline resp. status   CV/Hemodynamics: Uneventful            Sign Out: Acceptable CV status   Other NRE: NONE   DID A NON-ROUTINE EVENT OCCUR? No    Event details/Postop Comments:  Epidural level regressing appropriately.  Moving bilateral lower extremities. Pain well controlled.           Last vitals:  Vitals Value Taken Time   /59 23 0900   Temp     Pulse 92 23 0900   Resp 12 23 0900   SpO2 98 % 23 0900       Electronically Signed By: Rajiv Malik MD  2023  3:14 PM

## 2023-02-20 NOTE — BRIEF OP NOTE
[facility]    Brief Operative Note    Pre-operative diagnosis: IUP at 36+0wga. Severe Gestational Hypertension. Fetal Intolerance of labor Remote from Delivery  Post-operative diagnosis Same as pre-operative diagnosis    Procedure: Procedure(s):   SECTION--Primary  Surgeon: Surgeon(s) and Role:     * Ruth Ferreira MD - Primary  Anesthesia: Epidural   Estimated Blood Loss: 1090 ml  Urine: 225 ml  Drains: None  Specimens: Placenta to Pathology  Findings:   Female infant in vertex presentation. APGARs 8,8. Weight 6 lbs 2 ounces. Normal uterus and normal bilateral fallopian tubes and ovaries. No clear evidence of placental abruption.  Complications: None.  Implants: None       unable to assess no...

## 2023-02-20 NOTE — PLAN OF CARE
Beata and her  daughter were transferred to room 404 via cart. Report was given to Sue PIÑA who will take over care.

## 2023-02-20 NOTE — ANESTHESIA PREPROCEDURE EVALUATION
Anesthesia Pre-Procedure Evaluation    Patient: Beata Joseph   MRN: [id number] : 1990        Procedure : * No procedures listed *          Past Medical History:   Diagnosis Date     Abnormal cervical Papanicolaou smear 2018, 21     Cervical high risk HPV (human papillomavirus) test positive 2018, 2019, 07/15/20, 21     Depressive disorder 2020     Gastroesophageal reflux disease without esophagitis 2020     Gestational HTN, third trimester 2023     Urinary tract infection       Past Surgical History:   Procedure Laterality Date     COLPOSCOPY  2021     ENT SURGERY  2012    Tonsillectomy      Allergies   Allergen Reactions     Cephalosporins Nausea and Vomiting     Cats Hives     No Clinical Screening - See Comments Other (See Comments)     Pollens and animal dander     Pollen Extract      Other reaction(s): Sneezing      Social History     Tobacco Use     Smoking status: Never     Smokeless tobacco: Never   Substance Use Topics     Alcohol use: Not Currently      Wt Readings from Last 1 Encounters:   23 89.8 kg (198 lb)        Anesthesia Evaluation            ROS/MED HX  ENT/Pulmonary:       Neurologic:       Cardiovascular:     (+) --PIH ---    METS/Exercise Tolerance:     Hematologic:       Musculoskeletal:       GI/Hepatic:     (+) GERD,     Renal/Genitourinary:       Endo:       Psychiatric/Substance Use:       Infectious Disease:       Malignancy:       Other:            Physical Exam    Airway        Mallampati: II       Respiratory Devices and Support         Dental           Cardiovascular   cardiovascular exam normal          Pulmonary   pulmonary exam normal                OUTSIDE LABS:  CBC:   Lab Results   Component Value Date    WBC 14.0 (H) 2023    WBC 12.2 (H) 2023    HGB 11.2 (L) 2023    HGB 11.1 (L) 2023    HCT 35.0 2023    HCT 34.5 (L) 2023     2023      2023     BMP:   Lab Results   Component Value Date     2023     2023    POTASSIUM 4.2 2023    POTASSIUM 4.4 2023    CHLORIDE 106 2023    CHLORIDE 105 2023    CO2 19 (L) 2023    CO2 19 (L) 2023    BUN 5.8 (L) 2023    BUN 6.6 2023    CR 0.47 (L) 2023    CR 0.59 2023    GLC 84 2023    GLC 76 2023     COAGS:   Lab Results   Component Value Date    INR 0.96 2021     POC:   Lab Results   Component Value Date    HCG Negative 2021    HCGS Negative 2021     HEPATIC:   Lab Results   Component Value Date    ALBUMIN 3.4 (L) 2023    PROTTOTAL 6.5 2023    ALT 16 2023    AST 19 2023    ALKPHOS 144 (H) 2023    BILITOTAL 0.2 2023     OTHER:   Lab Results   Component Value Date    HARSH 9.4 2023    MAG 1.8 2021    TSH 1.47 2022       Anesthesia Plan    ASA Status:  2   NPO Status:  ELEVATED Aspiration Risk/Unknown    Anesthesia Type: Epidural.              Consents    Anesthesia Plan(s) and associated risks, benefits, and realistic alternatives discussed. Questions answered and patient/representative(s) expressed understanding.    - Discussed:     - Discussed with:  Patient    Use of blood products discussed: Yes.     - Discussed with: Patient.     - Consented: consented to blood products            Reason for refusal: other.     Postoperative Care    Pain management: Multi-modal analgesia.   PONV prophylaxis: Ondansetron (or other 5HT-3)     Comments:    Other Comments: Labor epidural to  section for fetal intolerance of labor.  Epidural duramorph            DORIAN TRAN MD

## 2023-02-20 NOTE — ANESTHESIA CARE TRANSFER NOTE
Patient: Beata Joseph    Procedure: Procedure(s):   SECTION       Diagnosis: * No pre-op diagnosis entered *  Diagnosis Additional Information: No value filed.    Anesthesia Type:   Epidural     Note:    Oropharynx: oropharynx clear of all foreign objects  Level of Consciousness: awake  Oxygen Supplementation: room air    Independent Airway: airway patency satisfactory and stable  Dentition: dentition unchanged  Vital Signs Stable: post-procedure vital signs reviewed and stable  Report to RN Given: handoff report given  Patient transferred to: PACU    Handoff Report: Identifed the Patient, Identified the Reponsible Provider, Reviewed the pertinent medical history, Discussed the surgical course, Reviewed Intra-OP anesthesia mangement and issues during anesthesia, Set expectations for post-procedure period and Allowed opportunity for questions and acknowledgement of understanding      Vitals:  Vitals Value Taken Time   BP     Temp     Pulse     Resp     SpO2         Electronically Signed By: ALEJO Abraham CRNA  2023  8:26 AM

## 2023-02-20 NOTE — PLAN OF CARE
1920  Report received from Francia TEJEDA RN.  Assuming care at this time.      2000  Dr Ferreira paged with return call received.  Update included but not limited to:  Most recent blood pressure of 131/82.  Maternal heart rate since arrival to maternal assessment center and transfer to labor and delivery has been greater than 90 and up to 130.  Patient wants her Wellbutrin as early in the pregnancy the risks/benefits/alternatives were discussed and it was decided that she wanted to continue on the medicaiton.  RN asked for clarification of oral labetalol order - as it was put on hold by prior RN.  Clarified that vaginal misoprostol is to continue.    Plan per provider Continue vaginal misoprostol until order changed and/or if patient bishops greater than 5 start oxytocin per order set.  Patient may receive nitrous oxide,fentanyl, and/or epidural as needed for labor pain relief.  Patient to receive labetalol PO as previously ordered unless blood pressures are less than 90/50.  May place reddy catheter after epidural, remove prior to pushing.  Does not want to order Wellbutrin at this time as does have the side effect of elevating blood pressure and will evaluate this in the morning and order then based on her blood pressures at that time.      2010  RN to patient bedside and discussed plan of care as discussed with Dr Ferreira.  Patient verbalizes understanding and is in agreement.      2100  RN to bedside to discuss labor pain control options with patient and spouse.  Patient resting in bed on left side.  As RN talking patients eyes drifting closed.  RN offered to patient to discuss options later and she said she would prefer this.      2225  Patient turned on call light RN to bedside, patient states she has had tachycardia all during her pregnancy and is now feeling like her heart is racing.  Patient is laying on her right side.  Vital signs checked and are at her baseline.  RN reviewed patients heart rate since  her arrival.  Patient states she usually takes a Unisom at bedtime.  RN offered and patient accepted ordered hydroxyzine.      2231  Patient given PRN hydroxyzine as ordered.  RN also informed patient that she can have dose repeated once as needed.      0025  SVE=unchanged, misoprostol 25 mcg placed vaginally as ordered.      0230  Patient turned on call light and stated she is feeling more discomfort in her bottom, like she needs to have bowel movement, so much pressure in her bottom.  Rates a 6 on 0-10 scale.      0250  Hydroxyzine 50 mg administered per orders - see MAR.      0350  RN to patient bedside.  Patient remains uncomfortable.  Discussed options with patient.      0410  Patient requested epidural.  500 ml LR fluid bolus initiated.  JULIA Onofre paged with return call received.  Orders entered by JULIA, medications obtained from [facility] by RN, informed consent obtained, and consent signed and witnessed.  Time out completed.  During procedure test dose done and was negative.  Epidural bolus completed by Dr Onofre, PCEA (patient controlled epidural analgesia) pump settings verified by Kaur MONSIVAIS RN.  Base rate infusing at 12 ml/hr see MAR.  See Labor flowsheets.      0513  Epidural procedure completed.  Patient repositioned to right tilt and frequent blood pressure checks initiated.  RN adjusting external ultrasound noted fetal heart tones to be audible at 60 noted maternal heart rate via oximeter to be  .  Noted first blood pressure to be 99/55 significantly lower than patient baseline.  Ephedrine administered per orders.      0529  RN notified Dr Jemima DUMONT that ephedrine administered three times without rise in patient blood pressures and currently patient symptomatic for hypotension and fetus is having decelerations.  Orders for phenylephrine and LR fluid bolus.      0534  RN notified Dr Jemima DUMONT of current blood pressure and patient condition.  Order for one time dose of phenylephrine 200 mcg.       0600  Dr Ferreira paged with return call received.  Update included but not limited to:  Patient received 3 doses of vaginal misoprostol, last one being at 0025.  Patient has been uncomfortable since 0230.  Patient received epidural and when laying down after procedure fetal heart tones were audible at 70.  Then patient had a significant drop in blood pressure down to 70s/30s that resulted in patient being symptomatic and fetus having minimal variability and late decelerations.  Now that blood pressures have been treated, fetus continues to have late decelerations and decreased variability.  Have just repositioned patient to left side for first time since epidural.  Rn would like provider at hospital as this has been going on for more than 30 minutes at this time.    Plan per provider to stop magnesium sulfate continuous infusion and to come in to see patient and evaluate.     0630  Dr Ferreira at bedside.  Reviewed fetal monitor tracing.  Reviewed blood pressures including blood pressure of 106/55 at 0025 prior to last vaginal misoprostol administration and blood pressures since epidural placement.      0635  SVE by Dr Ferreira=3/80/-1. Bloody show noted on glove when her hand was removed.  Plan of care discussed with patient and spouse.  Including lack of fetal response to scalp stimulation.      0645  Plan to move forward with  delivery.  Pre-operative cares initiated.      0710  Report to Lori RAE RN.  Care transferred at this time.     0714  Patient to OB OR via bed.

## 2023-02-21 LAB — HGB BLD-MCNC: 7.7 G/DL (ref 11.7–15.7)

## 2023-02-21 PROCEDURE — 250N000011 HC RX IP 250 OP 636: Performed by: OBSTETRICS & GYNECOLOGY

## 2023-02-21 PROCEDURE — 258N000003 HC RX IP 258 OP 636: Performed by: OBSTETRICS & GYNECOLOGY

## 2023-02-21 PROCEDURE — 36415 COLL VENOUS BLD VENIPUNCTURE: CPT | Performed by: OBSTETRICS & GYNECOLOGY

## 2023-02-21 PROCEDURE — 250N000013 HC RX MED GY IP 250 OP 250 PS 637: Performed by: OBSTETRICS & GYNECOLOGY

## 2023-02-21 PROCEDURE — 120N000012 HC R&B POSTPARTUM

## 2023-02-21 PROCEDURE — 85018 HEMOGLOBIN: CPT | Performed by: OBSTETRICS & GYNECOLOGY

## 2023-02-21 RX ORDER — METHYLPREDNISOLONE SODIUM SUCCINATE 125 MG/2ML
125 INJECTION, POWDER, LYOPHILIZED, FOR SOLUTION INTRAMUSCULAR; INTRAVENOUS
Status: DISCONTINUED | OUTPATIENT
Start: 2023-02-21 | End: 2023-02-22 | Stop reason: HOSPADM

## 2023-02-21 RX ORDER — DIPHENHYDRAMINE HYDROCHLORIDE 50 MG/ML
50 INJECTION INTRAMUSCULAR; INTRAVENOUS
Status: DISCONTINUED | OUTPATIENT
Start: 2023-02-21 | End: 2023-02-22 | Stop reason: HOSPADM

## 2023-02-21 RX ADMIN — PRENATAL VITAMINS-IRON FUMARATE 27 MG IRON-FOLIC ACID 0.8 MG TABLET 1 TABLET: at 08:43

## 2023-02-21 RX ADMIN — BUPROPION HYDROCHLORIDE 150 MG: 150 TABLET, FILM COATED, EXTENDED RELEASE ORAL at 08:43

## 2023-02-21 RX ADMIN — PANTOPRAZOLE SODIUM 40 MG: 40 TABLET, DELAYED RELEASE ORAL at 08:43

## 2023-02-21 RX ADMIN — ACETAMINOPHEN 975 MG: 325 TABLET, FILM COATED ORAL at 02:23

## 2023-02-21 RX ADMIN — ACETAMINOPHEN 975 MG: 325 TABLET, FILM COATED ORAL at 08:42

## 2023-02-21 RX ADMIN — IBUPROFEN 800 MG: 400 TABLET ORAL at 16:02

## 2023-02-21 RX ADMIN — IBUPROFEN 800 MG: 400 TABLET ORAL at 23:28

## 2023-02-21 RX ADMIN — IRON SUCROSE 300 MG: 20 INJECTION, SOLUTION INTRAVENOUS at 12:00

## 2023-02-21 RX ADMIN — ACETAMINOPHEN 975 MG: 325 TABLET, FILM COATED ORAL at 14:36

## 2023-02-21 RX ADMIN — IBUPROFEN 800 MG: 400 TABLET ORAL at 09:58

## 2023-02-21 RX ADMIN — KETOROLAC TROMETHAMINE 30 MG: 30 INJECTION, SOLUTION INTRAMUSCULAR at 04:16

## 2023-02-21 RX ADMIN — ACETAMINOPHEN 975 MG: 325 TABLET, FILM COATED ORAL at 20:21

## 2023-02-21 RX ADMIN — SENNOSIDES AND DOCUSATE SODIUM 2 TABLET: 50; 8.6 TABLET ORAL at 08:43

## 2023-02-21 RX ADMIN — OXYCODONE HYDROCHLORIDE 5 MG: 5 TABLET ORAL at 20:21

## 2023-02-21 ASSESSMENT — ACTIVITIES OF DAILY LIVING (ADL)
ADLS_ACUITY_SCORE: 20

## 2023-02-21 NOTE — PROVIDER NOTIFICATION
02/20/23 214   Provider Notification   Provider Name/Title Dr. Salvador   Method of Notification Phone   Request Evaluate-Remote   Notification Reason Lab Results;Other   Dr. Salvador called regarding patient declining to ambulate due to feeling tired. MD aware on recent lab results, plan for hemoglobin to be redrawn tomorrow morning (02/21/2023). Okay to keep reddy in until morning (02/21/2023) and remove at nurse discretion per MD orders. Page MD with BP > 160/110.

## 2023-02-21 NOTE — PROGRESS NOTES
[facility] Obstetrics Post-Op / Progress Note         Assessment and Plan:    Assessment:   Post-operative day #1  Low transverse primary  section  L&D complications: Gestational Hypertension: Severe  Acute Blood loss anemia from large blood loss with surgery      Doing well.  No excessive bleeding  Pain well-controlled.  Less fatigue this AM  Normotensive      Plan:   Acute blood loss anemia: will start IV Venofer. If weak again today will discuss a blood transfusiuon. Continue hemoglobins daily  Advance post op cares           Interval History:   Doing well this morning. Had some sleep earlier this morning and feels better from a fatigue standpoint. Denies preeclamptic symptoms.          Significant Problems:    Severe Gestational Hypertension  Acute Blood loss Anemia          Review of Systems:    The Review of Systems is negative other than noted in the HPI          Medications:     Current Facility-Administered Medications   Medication     [START ON 2023] acetaminophen (TYLENOL) tablet 650 mg     acetaminophen (TYLENOL) tablet 975 mg     [START ON 2023] bisacodyl (DULCOLAX) suppository 10 mg     buPROPion (WELLBUTRIN XL) 24 hr tablet 150 mg     calcium carbonate (TUMS) chewable tablet 1,000 mg     calcium gluconate 10 % injection 1 g     carboprost (HEMABATE) injection 250 mcg     dextrose 5% in lactated ringers infusion     diphenhydrAMINE (BENADRYL) capsule 25 mg    Or     diphenhydrAMINE (BENADRYL) injection 25 mg     diphenhydrAMINE (BENADRYL) injection 50 mg     ePHEDrine injection 5 mg     EPINEPHrine (ADRENALIN) kit 0.3 mg     famotidine (PEPCID) injection 20 mg     fentaNYL (SUBLIMAZE) 2 mcg/mL, bupivacaine (MARCAINE) 0.125% in NS premix for PCEA     hydrALAZINE (APRESOLINE) injection 10 mg     hydrocortisone (Perianal) (ANUSOL-HC) 2.5 % cream     HYDROmorphone (PF) (DILAUDID) injection 0.3-0.5 mg     ibuprofen (ADVIL/MOTRIN) tablet 800 mg     iron sucrose (VENOFER) 300  mg in sodium chloride 0.9 % 265 mL intermittent infusion     labetalol (NORMODYNE/TRANDATE) injection 20-80 mg     lactated ringers BOLUS 1,000 mL    Or     lactated ringers BOLUS 500 mL     lactated ringers BOLUS 250 mL     lactated ringers BOLUS 250 mL     lactated ringers infusion     lanolin cream     lidocaine (LMX4) cream     lidocaine 1 % 0.1-1 mL     LORazepam (ATIVAN) injection 2 mg     magnesium hydroxide (MILK OF MAGNESIA) suspension 30 mL     magnesium sulfate 2 g in 50 mL sterile water intermittent infusion     magnesium sulfate 4 g in 100 mL sterile water intermittent infusion     magnesium sulfate injection 10 g     methylergonovine (METHERGINE) injection 200 mcg     methylPREDNISolone sodium succinate (solu-MEDROL) injection 125 mg     metoclopramide (REGLAN) injection 10 mg    Or     metoclopramide (REGLAN) tablet 10 mg     misoprostol (CYTOTEC) tablet 400 mcg    Or     misoprostol (CYTOTEC) tablet 800 mcg     nalbuphine (NUBAIN) injection 2.5-5 mg     naloxone (NARCAN) injection 0.2 mg    Or     naloxone (NARCAN) injection 0.4 mg    Or     naloxone (NARCAN) injection 0.2 mg    Or     naloxone (NARCAN) injection 0.4 mg     No MMR Needed -  Assessment: Patient does not need MMR vaccine     No Tdap Needed - Assessment: Patient does not need Tdap vaccine     ondansetron (ZOFRAN ODT) ODT tab 4 mg    Or     ondansetron (ZOFRAN) injection 4 mg     ondansetron (ZOFRAN ODT) ODT tab 4 mg    Or     ondansetron (ZOFRAN) injection 4 mg     oxyCODONE (ROXICODONE) tablet 5 mg     oxytocin (PITOCIN) 30 units in 500 mL 0.9% NaCl infusion     oxytocin (PITOCIN) injection 10 Units     pantoprazole (PROTONIX) EC tablet 40 mg     prenatal multivitamin w/iron per tablet 1 tablet     prochlorperazine (COMPAZINE) injection 10 mg    Or     prochlorperazine (COMPAZINE) tablet 10 mg    Or     prochlorperazine (COMPAZINE) suppository 25 mg     ROPivacaine (NAROPIN) injection 10 mL     ROPivacaine (NAROPIN) injection 10 mL      senna-docusate (SENOKOT-S/PERICOLACE) 8.6-50 MG per tablet 1 tablet    Or     senna-docusate (SENOKOT-S/PERICOLACE) 8.6-50 MG per tablet 2 tablet     simethicone (MYLICON) chewable tablet 80 mg     sodium chloride (PF) 0.9% PF flush 3 mL     sodium chloride (PF) 0.9% PF flush 3 mL     [START ON 2/22/2023] sodium phosphate (FLEET ENEMA) 1 enema     tranexamic acid 1 g in 100 mL NS IV bag (premix)             Physical Exam:     Patient Vitals for the past 24 hrs:   BP Temp Temp src Pulse Resp SpO2 Weight   02/21/23 0842 130/87 98.3  F (36.8  C) Oral 87 16 -- --   02/21/23 0415 -- -- -- -- -- -- 90.7 kg (200 lb)   02/21/23 0413 116/69 98.5  F (36.9  C) Oral 82 15 95 % --   02/21/23 0300 -- -- -- -- 16 96 % --   02/21/23 0130 -- -- -- -- 15 98 % --   02/20/23 2322 123/76 98.3  F (36.8  C) Oral 83 16 97 % --   02/20/23 2130 -- -- -- -- 16 95 % --   02/20/23 1953 126/71 98.5  F (36.9  C) Oral 91 15 95 % --   02/20/23 1900 -- -- -- -- 16 96 % --   02/20/23 1658 -- -- -- -- 16 98 % --   02/20/23 1600 114/74 98.5  F (36.9  C) Oral 98 16 98 % --   02/20/23 1500 106/75 -- -- 95 16 96 % --   02/20/23 1400 126/75 -- -- 93 -- -- --   02/20/23 1300 112/78 -- -- 90 -- 99 % --   02/20/23 1200 126/76 97.8  F (36.6  C) Oral 97 16 98 % --   02/20/23 1145 134/85 -- -- 81 17 95 % --   02/20/23 1136 107/67 98.2  F (36.8  C) Oral 68 (!) 9 96 % --   02/20/23 1130 -- -- -- 68 16 96 % --   02/20/23 1115 107/56 -- -- 60 22 95 % --   02/20/23 1015 116/76 -- -- 103 20 96 % --   02/20/23 1000 124/83 -- -- 90 15 97 % --   02/20/23 0930 118/73 -- -- 96 19 97 % --   02/20/23 0915 119/79 -- -- 101 13 96 % --   02/20/23 0900 110/59 -- -- 92 12 98 % --     GEN: NAD  GI: soft, non-distended. Incision is covered with dressing.  Ext: non-tender.          Data:     Hemoglobin   Date Value Ref Range Status   02/21/2023 7.7 (L) 11.7 - 15.7 g/dL Final   02/20/2023 8.9 (L) 11.7 - 15.7 g/dL Final   02/19/2023 11.2 (L) 11.7 - 15.7 g/dL Final   02/17/2023 11.1  (L) 11.7 - 15.7 g/dL Final   02/16/2023 10.8 (L) 11.7 - 15.7 g/dL Final   06/14/2021 13.5 11.7 - 15.7 g/dL Final     -    Ruth Ferreira MD

## 2023-02-21 NOTE — PLAN OF CARE
Vital signs stable. Voiding without difficulty. Showered this morning and abdominal dressing was removed. Incision is intact with steri strips. No drainage noted.  Lung sounds clear and equal. Using Tylenol/Ibuprofen for pain management. Up ambulating free of dizziness. Working on breastfeeding every 2-3 hours as well as pumping. Encouraged to call with questions/concerns. Will continue to monitor.

## 2023-02-21 NOTE — PLAN OF CARE
Vitals stable, up ad binu independently with steady gait, and voiding spontaneously without difficulty. Appears to be bonding well with baby.  appears supportive. Fundus firm with scant bleeding. Breastfeeding using shield, pumping and giving colostrum to infant by tube/syringe at breast or by finger-feeding, and giving formula by bottle to infant. Continue with current plan of care.

## 2023-02-21 NOTE — PROVIDER NOTIFICATION
Dr. Ferreira notified of patients hemoglobin level of 7.7  IV Venofer was ordered.   Notify MD if symptoms worsen and a blood transfusion will be considered.

## 2023-02-22 VITALS
BODY MASS INDEX: 29.62 KG/M2 | WEIGHT: 200 LBS | SYSTOLIC BLOOD PRESSURE: 141 MMHG | RESPIRATION RATE: 16 BRPM | TEMPERATURE: 98.1 F | DIASTOLIC BLOOD PRESSURE: 92 MMHG | HEIGHT: 69 IN | OXYGEN SATURATION: 97 % | HEART RATE: 89 BPM

## 2023-02-22 LAB
HGB BLD-MCNC: 8.6 G/DL (ref 11.7–15.7)
PATH REPORT.COMMENTS IMP SPEC: NORMAL
PATH REPORT.COMMENTS IMP SPEC: NORMAL
PATH REPORT.FINAL DX SPEC: NORMAL
PATH REPORT.GROSS SPEC: NORMAL
PATH REPORT.MICROSCOPIC SPEC OTHER STN: NORMAL
PATH REPORT.RELEVANT HX SPEC: NORMAL
PHOTO IMAGE: NORMAL

## 2023-02-22 PROCEDURE — 36415 COLL VENOUS BLD VENIPUNCTURE: CPT | Performed by: OBSTETRICS & GYNECOLOGY

## 2023-02-22 PROCEDURE — 250N000013 HC RX MED GY IP 250 OP 250 PS 637: Performed by: OBSTETRICS & GYNECOLOGY

## 2023-02-22 PROCEDURE — 88307 TISSUE EXAM BY PATHOLOGIST: CPT | Mod: 26 | Performed by: PATHOLOGY

## 2023-02-22 PROCEDURE — 258N000003 HC RX IP 258 OP 636: Performed by: OBSTETRICS & GYNECOLOGY

## 2023-02-22 PROCEDURE — 85018 HEMOGLOBIN: CPT | Performed by: OBSTETRICS & GYNECOLOGY

## 2023-02-22 PROCEDURE — 250N000011 HC RX IP 250 OP 636: Performed by: OBSTETRICS & GYNECOLOGY

## 2023-02-22 RX ORDER — OXYCODONE HYDROCHLORIDE 5 MG/1
5 TABLET ORAL EVERY 4 HOURS PRN
Qty: 20 TABLET | Refills: 0 | Status: SHIPPED | OUTPATIENT
Start: 2023-02-22 | End: 2023-03-10

## 2023-02-22 RX ORDER — AMOXICILLIN 250 MG
2 CAPSULE ORAL DAILY
Qty: 60 TABLET | Refills: 0 | Status: SHIPPED | OUTPATIENT
Start: 2023-02-22 | End: 2023-03-10

## 2023-02-22 RX ORDER — IBUPROFEN 800 MG/1
800 TABLET, FILM COATED ORAL EVERY 6 HOURS PRN
COMMUNITY
Start: 2023-02-22 | End: 2023-07-27

## 2023-02-22 RX ORDER — ACETAMINOPHEN 325 MG/1
975 TABLET ORAL EVERY 6 HOURS
COMMUNITY
Start: 2023-02-22 | End: 2023-03-10

## 2023-02-22 RX ADMIN — IBUPROFEN 800 MG: 400 TABLET ORAL at 06:03

## 2023-02-22 RX ADMIN — BUPROPION HYDROCHLORIDE 150 MG: 150 TABLET, FILM COATED, EXTENDED RELEASE ORAL at 08:30

## 2023-02-22 RX ADMIN — SENNOSIDES AND DOCUSATE SODIUM 2 TABLET: 50; 8.6 TABLET ORAL at 08:30

## 2023-02-22 RX ADMIN — OXYCODONE HYDROCHLORIDE 5 MG: 5 TABLET ORAL at 06:03

## 2023-02-22 RX ADMIN — ACETAMINOPHEN 975 MG: 325 TABLET, FILM COATED ORAL at 09:39

## 2023-02-22 RX ADMIN — IBUPROFEN 800 MG: 400 TABLET ORAL at 12:06

## 2023-02-22 RX ADMIN — PRENATAL VITAMINS-IRON FUMARATE 27 MG IRON-FOLIC ACID 0.8 MG TABLET 1 TABLET: at 08:29

## 2023-02-22 RX ADMIN — PANTOPRAZOLE SODIUM 40 MG: 40 TABLET, DELAYED RELEASE ORAL at 08:30

## 2023-02-22 RX ADMIN — IRON SUCROSE 300 MG: 20 INJECTION, SOLUTION INTRAVENOUS at 09:41

## 2023-02-22 RX ADMIN — ACETAMINOPHEN 975 MG: 325 TABLET, FILM COATED ORAL at 03:31

## 2023-02-22 ASSESSMENT — ACTIVITIES OF DAILY LIVING (ADL)
ADLS_ACUITY_SCORE: 20

## 2023-02-22 NOTE — PROGRESS NOTES
REJI Courtesy Round:    Patient Name:  Beata Joseph  :      1990  MRN:      [id number]    Assessment:   Day 2 postpartum, s/p  for fetal intolerance of labor in the setting of IOL for pre-eclampsia with severe features.  REJI courtesy round.    Plan:    -Discussed pain management, postpartum mood changes and adjustments, postpartum blues vs. postpartum depression, sleep changes and required support. Has scheduled intake appt with postpartum MH provider at 8 am tomorrow.    -Plan for today: encouraged rest, skin-to-skin, adequate pain control and limiting visitors. Pt hoping to go home this evening. Planning IV iron as ordered per MD.    -Discussed that patient is under physician management with CNM support as needed.      Subjective:  Integrating birth experience. Pleased with her care. The patient is voiding and ambulating without difficulty. Pain is well controlled with current medications. Baby Sammie is rooming in. Support at home identified as family and friends. PP MH appointment already scheduled tomorrow, looking forward to talking through her experience with a professional.     Objective:  No exam. Courtesy round only.     Provider:  ALEJO Dial CNM    Date:  2023  Time:  9:36 AM

## 2023-02-22 NOTE — DISCHARGE SUMMARY
[facility] Discharge Summary    Beata Joseph MRN# [id number]   Age: 32 year old YOB: 1990     Date of Admission:  2023  Date of Discharge::  2023  Admitting Physician:  ALEJO Villagomez CNM  Discharge Physician:  Ruth Ferreira MD     Home clinic: [facility]          Admission Diagnoses:   IUP at 35+6wga  Severe Gestational Hypertension          Discharge Diagnosis:   Postpartum          Procedures:   Procedure(s): Primary low transverse  section       -           Medications Prior to Admission:     Medications Prior to Admission   Medication Sig Dispense Refill Last Dose     buPROPion (WELLBUTRIN XL) 150 MG 24 hr tablet Take 1 tablet (150 mg) by mouth every morning 30 tablet 11 2023     omeprazole (PRILOSEC) 20 MG DR capsule Take 1 capsule (20 mg) by mouth daily 90 capsule 3 2023     Prenat w/o T-ZA-Ciqcayo-FA-DHA (PNV-DHA PO)    2023     [DISCONTINUED] doxylamine (UNISOM) 25 MG TABS tablet Take 25 mg by mouth nightly as needed   2023             Discharge Medications:     Current Discharge Medication List      START taking these medications    Details   acetaminophen (TYLENOL) 325 MG tablet Take 3 tablets (975 mg) by mouth every 6 hours    Associated Diagnoses: S/P  section      ibuprofen (ADVIL/MOTRIN) 800 MG tablet Take 1 tablet (800 mg) by mouth every 6 hours as needed for other (cramping)    Associated Diagnoses: S/P  section      oxyCODONE (ROXICODONE) 5 MG tablet Take 1 tablet (5 mg) by mouth every 4 hours as needed for severe pain (7-10)  Qty: 20 tablet, Refills: 0    Associated Diagnoses: S/P  section      senna-docusate (SENOKOT-S/PERICOLACE) 8.6-50 MG tablet Take 2 tablets by mouth daily  Qty: 60 tablet, Refills: 0    Associated Diagnoses: S/P  section         CONTINUE these medications which have NOT CHANGED    Details   buPROPion (WELLBUTRIN XL) 150 MG 24 hr tablet Take 1 tablet (150 mg)  by mouth every morning  Qty: 30 tablet, Refills: 11    Associated Diagnoses: Anxiety; Mild episode of recurrent major depressive disorder (H)      omeprazole (PRILOSEC) 20 MG DR capsule Take 1 capsule (20 mg) by mouth daily  Qty: 90 capsule, Refills: 3    Associated Diagnoses: Gastroesophageal reflux disease without esophagitis      Prenat w/o [id number]-DHA (PNV-DHA PO)          STOP taking these medications       doxylamine (UNISOM) 25 MG TABS tablet Comments:   Reason for Stopping:                     Consultations:   NICU consultation          Brief History of Labor or Admission:   Beata was admitted [facility] with severe range blood pressures. She had been diagnosed with gestational hypertension by the [facility] at 34 weeks gestation. Given her severe range blood pressures she was given IM betamethasone and started on IV Magnesium. She experienced refractory hypotension after epidural placement and had a persistent category II tracing despite vasocontrictors and IV fluids. She was given the recommendation for a primary  section for fetal intolerance of labor remote from delivery.            Hospital Course:   The patient's hospital course was unremarkable.  She recovered as anticipated and experienced no post-operative complications.  On discharge, her pain was well controlled. Vaginal bleeding is similar to peak menstrual flow.  Voiding without difficulty.  Ambulating well and tolerating a normal diet.  No fever or significant wound drainage.  Breastfeeding well.  Infant is stable.  No bowel movement yet.  She was discharged on post-partum day #2. She had mildly elevated blood pressures at the time of discharge. She experienced acute blood loss anemia after her surgery and was treated with IV Venofer X 2. Her hemoglobin remained stable postpartum.     Post-partum hemoglobin:   Hemoglobin   Date Value Ref Range Status   2023 8.6 (L) 11.7 - 15.7 g/dL Final    06/14/2021 13.5 11.7 - 15.7 g/dL Final            Ruth Ferreira MD

## 2023-02-22 NOTE — PLAN OF CARE
Vital signs stable. Incision intact. Lung sounds clear. Patient reports pain being well controlled with Tylenol, Ibuprofen, and Oxycodone as needed. Planning on discharging home today. Questions and concerns addressed.

## 2023-02-22 NOTE — CONSULTS
Writer met with patient for postpartum depression/anxiety score of 14. She said that she is meeting with a therapist that specialized in postpartum depression tomorrow. RN said that they didn't want to do the full assessment. Writer gave resources to them.    Cassi Carrington, RAND

## 2023-02-22 NOTE — PLAN OF CARE
VSS. Incision C/D/I with steri strips. Up independently, ambulating in halls. Pain controlled with tylenol, ibuprofen and PRN oxycodone. Ice applied for incisional discomfort. Pumping and bottle feeding . Scored 14 on depression screen, SW consult placed. Continue with plan of care and notify provider as needed.

## 2023-02-22 NOTE — LACTATION NOTE
"Initial visit with Mother and Grandma and baby girl.  Mother states she has tried to breast feed and states that she \"thinks she is done and over it\".  States she has inverted nipples and is struggling to feed.  She plans to continue to pump and bottle feed infant.  She currently finger feeds EBM and then bottle feeds formula.  Emotional support and encouragement provided to Mother.  Re-assured Mother that her current plan to feed the infant is a great feeding plan!  Discussed use of the pump and frequency of pumping.  She has a new Spectra breast pump to use when discharged.  Reviewed early milk volumes and process of milk coming in.  Mother educated on normal  behavior, focusing on normal feeding patterns from birth to day 3 of life.   Breastfeeding general information reviewed.   Appreciative of visit.  No further questions at this time. Will follow as needed.   Estela Manuel RN, IBCLC     "

## 2023-02-22 NOTE — PROGRESS NOTES
[facility] Obstetrics Post-Op / Progress Note         Assessment and Plan:    Assessment:   Post-operative day #2  Low transverse primary  section  L&D complications: Severe Gestational Hypertension      Doing well.  No excessive bleeding  Pain well-controlled.  BP in mildly elevated range      Plan:   Criteria for starting oral antihypertensive is 150/100 mm Hg. Recommend daily home BP checks and reporting any BPs over 150/100 persistently.  In clinic follow up in one week. Homecare referral placed for BP and incision check within 72 hours as well  Acute blood loss anemia from surgery/delivery: stable and actually improved from yesterday. S/P 2 doses of IV Venofer so will defer oral iron. Asymptomatic.           Interval History:   Doing well. Denies any light headedness or dizziness. Ready for discharge.           Significant Problems:    Severe gestational hypertension          Review of Systems:    The Review of Systems is negative other than noted in the HPI          Medications:     Current Facility-Administered Medications   Medication     [START ON 2023] acetaminophen (TYLENOL) tablet 650 mg     acetaminophen (TYLENOL) tablet 975 mg     bisacodyl (DULCOLAX) suppository 10 mg     buPROPion (WELLBUTRIN XL) 24 hr tablet 150 mg     calcium carbonate (TUMS) chewable tablet 1,000 mg     calcium gluconate 10 % injection 1 g     carboprost (HEMABATE) injection 250 mcg     dextrose 5% in lactated ringers infusion     diphenhydrAMINE (BENADRYL) capsule 25 mg    Or     diphenhydrAMINE (BENADRYL) injection 25 mg     diphenhydrAMINE (BENADRYL) injection 50 mg     ePHEDrine injection 5 mg     EPINEPHrine (ADRENALIN) kit 0.3 mg     famotidine (PEPCID) injection 20 mg     fentaNYL (SUBLIMAZE) 2 mcg/mL, bupivacaine (MARCAINE) 0.125% in NS premix for PCEA     hydrALAZINE (APRESOLINE) injection 10 mg     hydrocortisone (Perianal) (ANUSOL-HC) 2.5 % cream     HYDROmorphone (PF) (DILAUDID) injection  0.3-0.5 mg     ibuprofen (ADVIL/MOTRIN) tablet 800 mg     iron sucrose (VENOFER) 300 mg in sodium chloride 0.9 % 290 mL intermittent infusion     labetalol (NORMODYNE/TRANDATE) injection 20-80 mg     lactated ringers BOLUS 1,000 mL    Or     lactated ringers BOLUS 500 mL     lactated ringers BOLUS 250 mL     lactated ringers BOLUS 250 mL     lactated ringers infusion     lanolin cream     lidocaine (LMX4) cream     lidocaine 1 % 0.1-1 mL     LORazepam (ATIVAN) injection 2 mg     magnesium hydroxide (MILK OF MAGNESIA) suspension 30 mL     magnesium sulfate 2 g in 50 mL sterile water intermittent infusion     magnesium sulfate 4 g in 100 mL sterile water intermittent infusion     magnesium sulfate injection 10 g     methylergonovine (METHERGINE) injection 200 mcg     methylPREDNISolone sodium succinate (solu-MEDROL) injection 125 mg     metoclopramide (REGLAN) injection 10 mg    Or     metoclopramide (REGLAN) tablet 10 mg     misoprostol (CYTOTEC) tablet 400 mcg    Or     misoprostol (CYTOTEC) tablet 800 mcg     nalbuphine (NUBAIN) injection 2.5-5 mg     naloxone (NARCAN) injection 0.2 mg    Or     naloxone (NARCAN) injection 0.4 mg    Or     naloxone (NARCAN) injection 0.2 mg    Or     naloxone (NARCAN) injection 0.4 mg     No MMR Needed -  Assessment: Patient does not need MMR vaccine     No Tdap Needed - Assessment: Patient does not need Tdap vaccine     ondansetron (ZOFRAN ODT) ODT tab 4 mg    Or     ondansetron (ZOFRAN) injection 4 mg     ondansetron (ZOFRAN ODT) ODT tab 4 mg    Or     ondansetron (ZOFRAN) injection 4 mg     oxyCODONE (ROXICODONE) tablet 5 mg     oxytocin (PITOCIN) 30 units in 500 mL 0.9% NaCl infusion     oxytocin (PITOCIN) injection 10 Units     pantoprazole (PROTONIX) EC tablet 40 mg     prenatal multivitamin w/iron per tablet 1 tablet     prochlorperazine (COMPAZINE) injection 10 mg    Or     prochlorperazine (COMPAZINE) tablet 10 mg    Or     prochlorperazine (COMPAZINE) suppository 25 mg      ROPivacaine (NAROPIN) injection 10 mL     ROPivacaine (NAROPIN) injection 10 mL     senna-docusate (SENOKOT-S/PERICOLACE) 8.6-50 MG per tablet 1 tablet    Or     senna-docusate (SENOKOT-S/PERICOLACE) 8.6-50 MG per tablet 2 tablet     simethicone (MYLICON) chewable tablet 80 mg     sodium chloride (PF) 0.9% PF flush 3 mL     sodium chloride (PF) 0.9% PF flush 3 mL     sodium phosphate (FLEET ENEMA) 1 enema     tranexamic acid 1 g in 100 mL NS IV bag (premix)             Physical Exam:     Patient Vitals for the past 24 hrs:   BP Temp Temp src Pulse Resp SpO2   02/22/23 0836 (!) 141/92 98.1  F (36.7  C) Oral 89 16 --   02/21/23 2315 132/87 98.4  F (36.9  C) Oral 81 16 --   02/21/23 2019 (!) 135/94 -- -- 85 18 97 %   02/21/23 1602 126/81 98.1  F (36.7  C) Oral 82 16 --     GEN: NAD  GI: soft. Non-distended. Incision is clean, dry and intact.           Data:     Hemoglobin   Date Value Ref Range Status   02/22/2023 8.6 (L) 11.7 - 15.7 g/dL Final   02/21/2023 7.7 (L) 11.7 - 15.7 g/dL Final   02/20/2023 8.9 (L) 11.7 - 15.7 g/dL Final   02/19/2023 11.2 (L) 11.7 - 15.7 g/dL Final   02/17/2023 11.1 (L) 11.7 - 15.7 g/dL Final   06/14/2021 13.5 11.7 - 15.7 g/dL Final     -    Ruth Ferreira MD

## 2023-02-23 ENCOUNTER — PATIENT OUTREACH (OUTPATIENT)
Dept: CARE COORDINATION | Facility: CLINIC | Age: 33
End: 2023-02-23
Payer: COMMERCIAL

## 2023-02-23 ENCOUNTER — HOSPITAL ENCOUNTER (INPATIENT)
Facility: CLINIC | Age: 33
LOS: 3 days | Discharge: HOME | DRG: 776 | End: 2023-02-26
Attending: EMERGENCY MEDICINE | Admitting: OBSTETRICS & GYNECOLOGY
Payer: COMMERCIAL

## 2023-02-23 ENCOUNTER — MYC MEDICAL ADVICE (OUTPATIENT)
Dept: MIDWIFE SERVICES | Facility: CLINIC | Age: 33
End: 2023-02-23
Payer: COMMERCIAL

## 2023-02-23 DIAGNOSIS — O13.3 GESTATIONAL HYPERTENSION, THIRD TRIMESTER: ICD-10-CM

## 2023-02-23 DIAGNOSIS — O13.3 GESTATIONAL HYPERTENSION, THIRD TRIMESTER: Primary | ICD-10-CM

## 2023-02-23 DIAGNOSIS — D62 ANEMIA DUE TO BLOOD LOSS, ACUTE: ICD-10-CM

## 2023-02-23 DIAGNOSIS — R79.89 ELEVATED LFTS: ICD-10-CM

## 2023-02-23 DIAGNOSIS — F33.0 MILD EPISODE OF RECURRENT MAJOR DEPRESSIVE DISORDER (H): Primary | ICD-10-CM

## 2023-02-23 PROBLEM — O13.9 GESTATIONAL HYPERTENSION: Status: ACTIVE | Noted: 2023-02-23

## 2023-02-23 LAB
ABO/RH(D): NORMAL
ALBUMIN MFR UR ELPH: <6 MG/DL (ref 1–14)
ALT SERPL W P-5'-P-CCNC: 38 U/L (ref 10–35)
ALT SERPL W P-5'-P-CCNC: 51 U/L (ref 10–35)
ANTIBODY SCREEN: NEGATIVE
AST SERPL W P-5'-P-CCNC: 50 U/L (ref 10–35)
AST SERPL W P-5'-P-CCNC: 59 U/L (ref 10–35)
CREAT SERPL-MCNC: 0.54 MG/DL (ref 0.51–0.95)
CREAT SERPL-MCNC: 0.59 MG/DL (ref 0.51–0.95)
CREAT UR-MCNC: 24 MG/DL
ERYTHROCYTE [DISTWIDTH] IN BLOOD BY AUTOMATED COUNT: 13.4 % (ref 10–15)
GFR SERPL CREATININE-BSD FRML MDRD: >90 ML/MIN/1.73M2
HCT VFR BLD AUTO: 29.8 % (ref 35–47)
HGB BLD-MCNC: 9.5 G/DL (ref 11.7–15.7)
MCH RBC QN AUTO: 26.4 PG (ref 26.5–33)
MCHC RBC AUTO-ENTMCNC: 31.9 G/DL (ref 31.5–36.5)
MCV RBC AUTO: 83 FL (ref 78–100)
PLATELET # BLD AUTO: 277 10E3/UL (ref 150–450)
PROT/CREAT 24H UR: NORMAL MG/G{CREAT}
RBC # BLD AUTO: 3.6 10E6/UL (ref 3.8–5.2)
SPECIMEN EXPIRATION DATE: NORMAL
WBC # BLD AUTO: 14.7 10E3/UL (ref 4–11)

## 2023-02-23 PROCEDURE — 84450 TRANSFERASE (AST) (SGOT): CPT | Performed by: EMERGENCY MEDICINE

## 2023-02-23 PROCEDURE — 250N000013 HC RX MED GY IP 250 OP 250 PS 637: Performed by: EMERGENCY MEDICINE

## 2023-02-23 PROCEDURE — 99285 EMERGENCY DEPT VISIT HI MDM: CPT | Mod: 25

## 2023-02-23 PROCEDURE — 250N000013 HC RX MED GY IP 250 OP 250 PS 637: Performed by: OBSTETRICS & GYNECOLOGY

## 2023-02-23 PROCEDURE — 120N000012 HC R&B POSTPARTUM

## 2023-02-23 PROCEDURE — 84450 TRANSFERASE (AST) (SGOT): CPT | Performed by: OBSTETRICS & GYNECOLOGY

## 2023-02-23 PROCEDURE — 85027 COMPLETE CBC AUTOMATED: CPT | Performed by: EMERGENCY MEDICINE

## 2023-02-23 PROCEDURE — 84460 ALANINE AMINO (ALT) (SGPT): CPT | Performed by: OBSTETRICS & GYNECOLOGY

## 2023-02-23 PROCEDURE — 82565 ASSAY OF CREATININE: CPT | Performed by: EMERGENCY MEDICINE

## 2023-02-23 PROCEDURE — 86850 RBC ANTIBODY SCREEN: CPT | Performed by: EMERGENCY MEDICINE

## 2023-02-23 PROCEDURE — 250N000011 HC RX IP 250 OP 636: Performed by: OBSTETRICS & GYNECOLOGY

## 2023-02-23 PROCEDURE — 258N000003 HC RX IP 258 OP 636: Performed by: OBSTETRICS & GYNECOLOGY

## 2023-02-23 PROCEDURE — 84156 ASSAY OF PROTEIN URINE: CPT | Performed by: EMERGENCY MEDICINE

## 2023-02-23 PROCEDURE — 36415 COLL VENOUS BLD VENIPUNCTURE: CPT | Performed by: EMERGENCY MEDICINE

## 2023-02-23 PROCEDURE — 36415 COLL VENOUS BLD VENIPUNCTURE: CPT | Performed by: OBSTETRICS & GYNECOLOGY

## 2023-02-23 PROCEDURE — 84460 ALANINE AMINO (ALT) (SGPT): CPT | Performed by: EMERGENCY MEDICINE

## 2023-02-23 PROCEDURE — 250N000011 HC RX IP 250 OP 636: Performed by: EMERGENCY MEDICINE

## 2023-02-23 PROCEDURE — 86901 BLOOD TYPING SEROLOGIC RH(D): CPT | Performed by: EMERGENCY MEDICINE

## 2023-02-23 RX ORDER — METOCLOPRAMIDE HYDROCHLORIDE 5 MG/ML
10 INJECTION INTRAMUSCULAR; INTRAVENOUS EVERY 6 HOURS PRN
Status: DISCONTINUED | OUTPATIENT
Start: 2023-02-23 | End: 2023-02-26 | Stop reason: HOSPADM

## 2023-02-23 RX ORDER — ONDANSETRON 2 MG/ML
4 INJECTION INTRAMUSCULAR; INTRAVENOUS EVERY 6 HOURS PRN
Status: DISCONTINUED | OUTPATIENT
Start: 2023-02-23 | End: 2023-02-26 | Stop reason: HOSPADM

## 2023-02-23 RX ORDER — IBUPROFEN 400 MG/1
800 TABLET, FILM COATED ORAL EVERY 8 HOURS PRN
Status: DISCONTINUED | OUTPATIENT
Start: 2023-02-23 | End: 2023-02-26 | Stop reason: HOSPADM

## 2023-02-23 RX ORDER — PROCHLORPERAZINE 25 MG
25 SUPPOSITORY, RECTAL RECTAL EVERY 12 HOURS PRN
Status: DISCONTINUED | OUTPATIENT
Start: 2023-02-23 | End: 2023-02-26 | Stop reason: HOSPADM

## 2023-02-23 RX ORDER — LABETALOL HYDROCHLORIDE 5 MG/ML
10 INJECTION, SOLUTION INTRAVENOUS ONCE
Status: COMPLETED | OUTPATIENT
Start: 2023-02-23 | End: 2023-02-23

## 2023-02-23 RX ORDER — LIDOCAINE 40 MG/G
CREAM TOPICAL
Status: DISCONTINUED | OUTPATIENT
Start: 2023-02-23 | End: 2023-02-26 | Stop reason: HOSPADM

## 2023-02-23 RX ORDER — OXYTOCIN 10 [USP'U]/ML
10 INJECTION, SOLUTION INTRAMUSCULAR; INTRAVENOUS
Status: DISCONTINUED | OUTPATIENT
Start: 2023-02-23 | End: 2023-02-23

## 2023-02-23 RX ORDER — DEXTROSE, SODIUM CHLORIDE, SODIUM LACTATE, POTASSIUM CHLORIDE, AND CALCIUM CHLORIDE 5; .6; .31; .03; .02 G/100ML; G/100ML; G/100ML; G/100ML; G/100ML
INJECTION, SOLUTION INTRAVENOUS CONTINUOUS
Status: DISCONTINUED | OUTPATIENT
Start: 2023-02-23 | End: 2023-02-23 | Stop reason: DRUGHIGH

## 2023-02-23 RX ORDER — AMOXICILLIN 250 MG
1 CAPSULE ORAL 2 TIMES DAILY
Status: DISCONTINUED | OUTPATIENT
Start: 2023-02-23 | End: 2023-02-26 | Stop reason: HOSPADM

## 2023-02-23 RX ORDER — AMOXICILLIN 250 MG
2 CAPSULE ORAL 2 TIMES DAILY
Status: DISCONTINUED | OUTPATIENT
Start: 2023-02-23 | End: 2023-02-26 | Stop reason: HOSPADM

## 2023-02-23 RX ORDER — SODIUM CHLORIDE, SODIUM LACTATE, POTASSIUM CHLORIDE, CALCIUM CHLORIDE 600; 310; 30; 20 MG/100ML; MG/100ML; MG/100ML; MG/100ML
INJECTION, SOLUTION INTRAVENOUS CONTINUOUS
Status: DISCONTINUED | OUTPATIENT
Start: 2023-02-23 | End: 2023-02-24

## 2023-02-23 RX ORDER — BISACODYL 10 MG
10 SUPPOSITORY, RECTAL RECTAL DAILY PRN
Status: DISCONTINUED | OUTPATIENT
Start: 2023-02-25 | End: 2023-02-26 | Stop reason: HOSPADM

## 2023-02-23 RX ORDER — LABETALOL HYDROCHLORIDE 5 MG/ML
20-80 INJECTION, SOLUTION INTRAVENOUS EVERY 10 MIN PRN
Status: DISCONTINUED | OUTPATIENT
Start: 2023-02-23 | End: 2023-02-26 | Stop reason: HOSPADM

## 2023-02-23 RX ORDER — LABETALOL 100 MG/1
100 TABLET, FILM COATED ORAL ONCE
Status: COMPLETED | OUTPATIENT
Start: 2023-02-23 | End: 2023-02-23

## 2023-02-23 RX ORDER — MAGNESIUM SULFATE IN WATER 40 MG/ML
2 INJECTION, SOLUTION INTRAVENOUS CONTINUOUS
Status: DISPENSED | OUTPATIENT
Start: 2023-02-23 | End: 2023-02-24

## 2023-02-23 RX ORDER — OXYCODONE HYDROCHLORIDE 5 MG/1
5 TABLET ORAL EVERY 4 HOURS PRN
Status: DISCONTINUED | OUTPATIENT
Start: 2023-02-23 | End: 2023-02-26 | Stop reason: HOSPADM

## 2023-02-23 RX ORDER — IBUPROFEN 400 MG/1
800 TABLET, FILM COATED ORAL EVERY 6 HOURS
Status: DISCONTINUED | OUTPATIENT
Start: 2023-02-24 | End: 2023-02-23

## 2023-02-23 RX ORDER — LABETALOL 100 MG/1
100 TABLET, FILM COATED ORAL 2 TIMES DAILY
Qty: 60 TABLET | Refills: 0 | Status: ON HOLD | OUTPATIENT
Start: 2023-02-23 | End: 2023-02-26

## 2023-02-23 RX ORDER — DIPHENHYDRAMINE HYDROCHLORIDE 50 MG/ML
25 INJECTION INTRAMUSCULAR; INTRAVENOUS EVERY 6 HOURS PRN
Status: DISCONTINUED | OUTPATIENT
Start: 2023-02-23 | End: 2023-02-26 | Stop reason: HOSPADM

## 2023-02-23 RX ORDER — METOCLOPRAMIDE 10 MG/1
10 TABLET ORAL EVERY 6 HOURS PRN
Status: DISCONTINUED | OUTPATIENT
Start: 2023-02-23 | End: 2023-02-26 | Stop reason: HOSPADM

## 2023-02-23 RX ORDER — DIPHENHYDRAMINE HCL 25 MG
25 CAPSULE ORAL EVERY 6 HOURS PRN
Status: DISCONTINUED | OUTPATIENT
Start: 2023-02-23 | End: 2023-02-26 | Stop reason: HOSPADM

## 2023-02-23 RX ORDER — SIMETHICONE 80 MG
80 TABLET,CHEWABLE ORAL 4 TIMES DAILY PRN
Status: DISCONTINUED | OUTPATIENT
Start: 2023-02-23 | End: 2023-02-26 | Stop reason: HOSPADM

## 2023-02-23 RX ORDER — OXYTOCIN/0.9 % SODIUM CHLORIDE 30/500 ML
100-340 PLASTIC BAG, INJECTION (ML) INTRAVENOUS CONTINUOUS PRN
Status: DISCONTINUED | OUTPATIENT
Start: 2023-02-23 | End: 2023-02-23

## 2023-02-23 RX ORDER — ONDANSETRON 4 MG/1
4 TABLET, ORALLY DISINTEGRATING ORAL EVERY 6 HOURS PRN
Status: DISCONTINUED | OUTPATIENT
Start: 2023-02-23 | End: 2023-02-26 | Stop reason: HOSPADM

## 2023-02-23 RX ORDER — KETOROLAC TROMETHAMINE 15 MG/ML
30 INJECTION, SOLUTION INTRAMUSCULAR; INTRAVENOUS EVERY 6 HOURS
Status: DISCONTINUED | OUTPATIENT
Start: 2023-02-23 | End: 2023-02-23

## 2023-02-23 RX ORDER — MODIFIED LANOLIN
OINTMENT (GRAM) TOPICAL
Status: DISCONTINUED | OUTPATIENT
Start: 2023-02-23 | End: 2023-02-26 | Stop reason: HOSPADM

## 2023-02-23 RX ORDER — LABETALOL 200 MG/1
200 TABLET, FILM COATED ORAL EVERY 12 HOURS SCHEDULED
Status: DISCONTINUED | OUTPATIENT
Start: 2023-02-23 | End: 2023-02-25

## 2023-02-23 RX ORDER — HYDROCORTISONE 25 MG/G
CREAM TOPICAL 3 TIMES DAILY PRN
Status: DISCONTINUED | OUTPATIENT
Start: 2023-02-23 | End: 2023-02-26 | Stop reason: HOSPADM

## 2023-02-23 RX ORDER — PROCHLORPERAZINE MALEATE 10 MG
10 TABLET ORAL EVERY 6 HOURS PRN
Status: DISCONTINUED | OUTPATIENT
Start: 2023-02-23 | End: 2023-02-26 | Stop reason: HOSPADM

## 2023-02-23 RX ADMIN — LABETALOL HYDROCHLORIDE 100 MG: 100 TABLET, FILM COATED ORAL at 14:18

## 2023-02-23 RX ADMIN — SENNOSIDES AND DOCUSATE SODIUM 1 TABLET: 50; 8.6 TABLET ORAL at 20:26

## 2023-02-23 RX ADMIN — SODIUM CHLORIDE, SODIUM LACTATE, POTASSIUM CHLORIDE, CALCIUM CHLORIDE AND DEXTROSE MONOHYDRATE: 5; 600; 310; 30; 20 INJECTION, SOLUTION INTRAVENOUS at 13:58

## 2023-02-23 RX ADMIN — LABETALOL HYDROCHLORIDE 200 MG: 200 TABLET, FILM COATED ORAL at 20:17

## 2023-02-23 RX ADMIN — MAGNESIUM SULFATE IN WATER 2 G/HR: 40 INJECTION, SOLUTION INTRAVENOUS at 14:19

## 2023-02-23 RX ADMIN — OXYCODONE HYDROCHLORIDE 5 MG: 5 TABLET ORAL at 16:28

## 2023-02-23 RX ADMIN — SODIUM CHLORIDE, POTASSIUM CHLORIDE, SODIUM LACTATE AND CALCIUM CHLORIDE 75 ML/HR: 600; 310; 30; 20 INJECTION, SOLUTION INTRAVENOUS at 17:09

## 2023-02-23 RX ADMIN — LABETALOL HYDROCHLORIDE 100 MG: 100 TABLET, FILM COATED ORAL at 17:05

## 2023-02-23 RX ADMIN — LABETALOL HYDROCHLORIDE 10 MG: 5 INJECTION INTRAVENOUS at 13:58

## 2023-02-23 ASSESSMENT — ENCOUNTER SYMPTOMS
VOMITING: 0
SORE THROAT: 0
NAUSEA: 1
LIGHT-HEADEDNESS: 1
CHEST TIGHTNESS: 1
CHILLS: 0
FEVER: 0
COUGH: 0

## 2023-02-23 ASSESSMENT — ACTIVITIES OF DAILY LIVING (ADL)
ADLS_ACUITY_SCORE: 35

## 2023-02-23 NOTE — TELEPHONE ENCOUNTER
2/20/23: Delivered C/S  GHTN-Severe  Pt told to reach out with >BP to get started on Meds  /110    LMTCB    Returned patient call  BPs have been  Elevated all morning. 164/112  Pt has been laying down and resting all morning.  Denies any headaches, visual changes, URQ pain.  Patient to proceed to ED at this time.  Patient will have someone else drive her.  Pt verbalized understanding, in agreement with plan, and voiced no further questions.  Katie Heredia RN on 2/23/2023 at 10:16 AM

## 2023-02-23 NOTE — PHARMACY-ADMISSION MEDICATION HISTORY
Pharmacy Medication History  Admission medication history interview status for the 2/23/2023  admission is complete. See EPIC admission navigator for prior to admission medications     Location of Interview: Patient room  Medication history sources: Patient and Surescripts    In the past week, patient estimated taking medication this percent of the time: greater than 90%    Medication Affordability:  Not including over the counter (OTC) medications, was there a time in the past 12 months when you did not take your medications as prescribed because of cost?: No    Medication reconciliation completed by provider prior to medication history? No    Time spent in this activity: 20 minutes    Prior to Admission medications    Medication Sig Last Dose Taking? Auth Provider Long Term End Date   acetaminophen (TYLENOL) 325 MG tablet Take 3 tablets (975 mg) by mouth every 6 hours 2/23/2023 at 0730 Yes Ruth Ferreira MD     buPROPion (WELLBUTRIN XL) 150 MG 24 hr tablet Take 1 tablet (150 mg) by mouth every morning 2/23/2023 at AM Yes Serina Munguia MD Yes    ibuprofen (ADVIL/MOTRIN) 800 MG tablet Take 1 tablet (800 mg) by mouth every 6 hours as needed for other (cramping) 2/22/2023 at PM Yes Ruth Ferreira MD     labetalol (NORMODYNE) 100 MG tablet Take 1 tablet (100 mg) by mouth 2 times daily 2/23/2023 at AM Yes Ruth Ferreira MD Yes    omeprazole (PRILOSEC) 20 MG DR capsule Take 1 capsule (20 mg) by mouth daily 2/22/2023 Yes Radha Parisi APRN CNM     oxyCODONE (ROXICODONE) 5 MG tablet Take 1 tablet (5 mg) by mouth every 4 hours as needed for severe pain (7-10) 2/22/2023 at 1800 Yes Ruth Ferreira MD     Prenat w/o [id number] (PNV-DHA PO)  2/23/2023 at AM Yes Reported, Patient     senna-docusate (SENOKOT-S/PERICOLACE) 8.6-50 MG tablet Take 2 tablets by mouth daily 2/22/2023 at AM Yes Ruth Ferreira MD         The information  provided in this note is only as accurate as the sources available at the time of update(s)

## 2023-02-23 NOTE — ED TRIAGE NOTES
HTN - pt 5 days post partum - pt was told to come to ER - complaining of slight HA        Triage Assessment     Row Name 02/23/23 9202       Triage Assessment (Adult)    Airway WDL WDL       Respiratory WDL    Respiratory WDL WDL       Cardiac WDL    Cardiac WDL WDL       Cognitive/Neuro/Behavioral WDL    Cognitive/Neuro/Behavioral WDL WDL

## 2023-02-23 NOTE — H&P
[facility]    History and Physical  Obstetrics and Gynecology     Date of Admission:  2023    Assessment & Plan   Beata Joseph is a 32 year old POD#3 from a primary  section for fetal intolerance remote from delivery after induction at 36 weeks for severe gestational hypertension     ASSESSMENT:   IUP @ 36w0d  Gestational Hypertension with newly elevated LFTs      PLAN:   Admit for 24 hours of Postpartum Magnesium  Start oral labetalol. Will give 200 mg BID  Will discharge to home when blood pressures remain under the target of 150/100 mm Hg.    Ruth Ferreira MD    History of Present Illness   Beata Joseph is a 32 year old female  readmitted due to severe range blood pressures. Beata was admitted at 35+6wga after presenting with severe range blood pressures. She had been receiving care from the [facility] and had a history of white coat hypertension. At 34 weeks she developed severe range hypertension. She had higher readings at home. During her induction she had refractory hypotension after oral labetalol X 1 and after her epidural. She did not respond to 4 doses of ephedrine and to 1.5 L of fluid. She also had late decelerations. Postpartum her blood pressures remained in the normal to mild range. She requested discharge to home on POD#2 due to inclement weather. She reported blood pressures above the threshold range today and presented to the ED when her pressures entered severe range at home. Her blood pressures were 160/112 mm Hg. In the ED her blood pressures initially self corrected to the mild range but she was given one dose of 10 mg IV labetalol and also 100 mg of oral labetalol as well. She had chest tightness on presentation that has resolved with improved blood pressures. She has no new visual symptoms and denies epigastric pain. She has no chest pain or shortness of breath. She had floaters in her vision prior to pregnancy. Her labs were notable for  newly elevated LFTs that had been normal prior. IV Magnesium was stopped at delivery due to the refractory hypotension and was not given postpartum initially.         Prior to Admission Medications   Prior to Admission Medications   Prescriptions Last Dose Informant Patient Reported? Taking?   Prenat w/o [id number]-DHA (PNV-DHA PO) 2/23/2023 at AM Self Yes Yes   acetaminophen (TYLENOL) 325 MG tablet 2/23/2023 at 0730 Self No Yes   Sig: Take 3 tablets (975 mg) by mouth every 6 hours   buPROPion (WELLBUTRIN XL) 150 MG 24 hr tablet 2/23/2023 at AM Self No Yes   Sig: Take 1 tablet (150 mg) by mouth every morning   ibuprofen (ADVIL/MOTRIN) 800 MG tablet 2/22/2023 at PM Self No Yes   Sig: Take 1 tablet (800 mg) by mouth every 6 hours as needed for other (cramping)   labetalol (NORMODYNE) 100 MG tablet 2/23/2023 at AM Self No Yes   Sig: Take 1 tablet (100 mg) by mouth 2 times daily   omeprazole (PRILOSEC) 20 MG DR capsule 2/22/2023 Self No Yes   Sig: Take 1 capsule (20 mg) by mouth daily   oxyCODONE (ROXICODONE) 5 MG tablet 2/22/2023 at 1800 Self No Yes   Sig: Take 1 tablet (5 mg) by mouth every 4 hours as needed for severe pain (7-10)   senna-docusate (SENOKOT-S/PERICOLACE) 8.6-50 MG tablet 2/22/2023 at AM Self No Yes   Sig: Take 2 tablets by mouth daily      Facility-Administered Medications: None     Allergies   Allergies   Allergen Reactions     Cephalosporins Nausea and Vomiting     Cats Hives     No Clinical Screening - See Comments Other (See Comments)     Pollens and animal dander     Pollen Extract      Other reaction(s): Sneezing         Immunization History   Immunization History   Administered Date(s) Administered     COVID-19 Vaccine 12+ (Pfizer) 12/21/2020, 01/07/2021, 10/07/2021     DTAP (<7y) 1991, 01/13/1992, 01/30/1992, 03/26/1992, 07/31/1997     DTaP, Unspecified 1991, 01/13/1992, 01/30/1992, 03/26/1992, 07/31/1997     FLU 6-35 months 10/04/2015     Flu, Unspecified 10/02/2019     HPV  Quadrivalent 2008, 2008, 2009     Hep B, Peds or Adolescent 10/29/2003, 2003, 2004     HepB, Unspecified 10/29/2003, 2003, 2004     Historic Hib Hib-titer 1991, 1992, 1992     Influenza (H1N1) 2009     Influenza Vaccine >6 months (Alfuria,Fluzone) 10/10/2019, 10/09/2020, 2021, 2022     MMR 1993, 1997     Mantoux Tuberculin Skin Test 1992     Meningococcal ACWY (Menactra ) 2009     OPV, trivalent, live 1991, 1992, 1993, 1997     OPV, unspecified 1991, 1992, 1993, 1997     TDAP Vaccine (Adacel) 2012     Td (Adult), Adsorbed 2004     Tdap (Adacel,Boostrix) 2012, 2022     Tdap (Adult) Unspecified Formulation 2004     Varicella 2014       Past Medical History:   Diagnosis Date     Abnormal cervical Papanicolaou smear 2018, 21     Cervical high risk HPV (human papillomavirus) test positive 2018, 2019, 07/15/20, 21     Depressive disorder 2020     Gastroesophageal reflux disease without esophagitis 2020     Gestational HTN, third trimester 2023     Urinary tract infection        Past Surgical History:   Procedure Laterality Date      SECTION N/A 2023    Procedure:  SECTION;  Surgeon: Ruth Ferreira MD;  Location: [facility]     COLPOSCOPY  2021     ENT SURGERY  2012    Tonsillectomy       Vitals:    23 1628 23 1640 23 1643 23 1705   BP: (!) 133/95 (!) 150/103 (!) 147/93 (!) 133/96   BP Location:       Pulse:    72   Resp:       Temp:       SpO2:       Weight:       Height:           Abdomen: Soft. Incision is clean, dry and intact. No epigastric or RUQ tenderness to palpation.  Constitutional: healthy, alert, active and no distress   Extremities: NT, no  Significant edema  Neurologic: Awake, alert, oriented x3. 2+  reflexes in bilateral lower extremities.  Neuropsychiatric: General: normal, calm and normal eye contact  Heart: Regular rate and rhythm  Lungs: clear to ausculation bilaterally    Ruth Ferreira MD

## 2023-02-23 NOTE — PROVIDER NOTIFICATION
02/23/23 1630   Deep Tendon Reflexes   Left Patellar Reflex 3-->brisker than average  (MD aware of left patellar 3+ reflex)   Right Patellar Reflex 2-->average, normal     Dr Ferreira here at bedside and aware of left patellar reflexes brisk at 3, ok to not do hourly reflexes unless they become more brisk.

## 2023-02-23 NOTE — PROGRESS NOTES
[facility]    Background: Transitional Care Management program identified per system criteria and reviewed by [facility] team for possible outreach.    Assessment: Upon chart review, [facility] Team member will not proceed with patient outreach related to this episode of Transitional Care Management program due to reason below:    Patient has presented to Emergency Department, been readmitted to hospital, or transferred to another hospital.    Plan: Transitional Care Management episode addressed appropriately per reason noted above.      Fior Black RN  [facility]    *Connected Care Resource Team does NOT follow patient ongoing. Referrals are identified based on internal discharge reports and the outreach is to ensure patient has an understanding of their discharge instructions.

## 2023-02-23 NOTE — ED NOTES
[facility]  ED Nurse Handoff Report    ED Chief complaint: Hypertension      ED Diagnosis:   Final diagnoses:   Postpartum hypertension   Anemia due to blood loss, acute   Elevated LFTs       Code Status: Full Code    Allergies:   Allergies   Allergen Reactions     Cephalosporins Nausea and Vomiting     Cats Hives     No Clinical Screening - See Comments Other (See Comments)     Pollens and animal dander     Pollen Extract      Other reaction(s): Sneezing       Patient Story: Patient has been having elevated blood pressures and headache. Patient is 5 days post-partum.   Focused Assessment:  Patient's Pressures have been elevated. She has an elevated WBC count as well as slightly high AST and ALT. No clonus in the ankles. Still having typical post partum vaginal blood.     Treatments and/or interventions provided: labetolol, fluids, and mag sulfate have been initiated.   Patient's response to treatments and/or interventions: no complaint of headache. Feels the mag will make her nauseous.     To be done/followed up on inpatient unit:  observe/treat for pre eclampsia    Does this patient have any cognitive concerns?: none    Activity level - Baseline/Home:  Independent  Activity Level - Current:   standby assistance    Patient's Preferred language: English   Needed?: No    Isolation: None  Infection: Not Applicable  Patient tested for COVID 19 prior to admission: NO  Bariatric?: No    Vital Signs:   Vitals:    02/23/23 1315 02/23/23 1330 02/23/23 1345 02/23/23 1418   BP: (!) 140/99 (!) 145/85 (!) 151/100 (!) 136/105   BP Location:       Pulse: 97 81 75 63   Resp: (!) 32 12 (!) 33    Temp:       SpO2: 97% 97%     Weight:       Height:           Cardiac Rhythm: NSR    Was the PSS-3 completed:   Yes    Family Comments:  and mother are able to be called if needed    For the majority of the shift this patient's behavior was Green.   Behavioral interventions performed were none.    ED NURSE  PHONE NUMBER: *[phone]

## 2023-02-23 NOTE — ED PROVIDER NOTES
History     Chief Complaint:  Hypertension       The history is provided by the patient.      Beata Joseph is a 32 year old female with a history of gestational hypertension who presents with hypertension. Also reports chest tightness, leg edema, and an overall sensation that her BP is elevated. The patient is 5 days post partum for her first child, discharged yesterday and was instructed by her OB to come back in if her blood pressure was above 150/100.  The patient states that she was having complications with hypertension in the 3rd trimester. Today she began to experience chest tightness, light-headedness and nausea that was similar to when she was hypertensive during her pregnancy. She mentions that her hemoglobin was low after her  section. She has been taking Tylenol, Oxycodone and Ibuprofen for the pain, last Tylenol at 0730 this morning, last Oxycodone was yesterday. She denies any visual changes, syncope, vomiting, sore throat, cough, fever, or chills.    She had not noticed any significant abnormalities with her incision.    Independent Historian:   None - Patient Only    Review of External Notes:     ROS:  Review of Systems   Constitutional: Negative for chills and fever.   HENT: Negative for sore throat.    Eyes: Negative for visual disturbance.   Respiratory: Positive for chest tightness. Negative for cough.    Gastrointestinal: Positive for nausea. Negative for vomiting.   Neurological: Positive for light-headedness. Negative for syncope.   All other systems reviewed and are negative.    Allergies:  Cephalosporins  Cats  Pollen Extract     Medications:    Wellbutrin   Normodyne   Prilosec   Roxicodone   Senokot     Past Medical History:    Depression   GERD  Gestational hypertension   UTI  White coat syndrome   Anxiety   Hyperglycemia   Anemia   Gallbladder attack     Past Surgical History:     section   Colposcopy   Tonsillectomy      Family History:    family history includes  "Depression in her father and sister; Diabetes in her maternal grandfather; Hyperlipidemia in her mother; Hypertension in her maternal grandmother; Mental Illness in her father; No Known Problems in her brother, paternal grandmother, and another family member; Thyroid Disease in her mother.    Social History:   reports that she has never smoked. She has never used smokeless tobacco. She reports that she does not currently use alcohol. She reports that she does not use drugs.  PCP: No Ref-Primary, Physician     Physical Exam     Patient Vitals for the past 24 hrs:   BP Temp Pulse Resp SpO2 Height Weight   02/23/23 1530 126/77 -- 86 18 -- -- --   02/23/23 1515 119/67 -- 79 15 -- -- --   02/23/23 1500 113/67 -- 75 15 -- -- --   02/23/23 1445 128/81 -- 67 13 -- -- --   02/23/23 1430 132/87 -- 75 16 -- -- --   02/23/23 1418 (!) 136/105 -- 63 -- -- -- --   02/23/23 1345 (!) 151/100 -- 75 (!) 33 -- -- --   02/23/23 1330 (!) 145/85 -- 81 12 97 % -- --   02/23/23 1315 (!) 140/99 -- 97 (!) 32 97 % -- --   02/23/23 1300 (!) 136/96 -- 87 29 97 % -- --   02/23/23 1245 (!) 129/101 -- 68 11 98 % -- --   02/23/23 1230 130/78 -- 75 12 97 % -- --   02/23/23 1215 138/87 -- 80 16 97 % -- --   02/23/23 1200 (!) 143/99 -- 90 18 -- -- --   02/23/23 1145 (!) 148/95 -- 87 17 97 % -- --   02/23/23 1135 (!) 147/103 -- 82 22 98 % -- --   02/23/23 1127 (!) 143/98 -- -- -- -- -- --   02/23/23 1055 (!) 174/118 97.8  F (36.6  C) 107 16 99 % 1.753 m (5' 9\") 83.9 kg (185 lb)        Physical Exam  General: Resting comfortably on the gurney  Head:  The scalp, face, and head appear normal  Eyes:  The pupils are equal, round, and reactive to light    There is no nystagmus    Extraocular muscles are intact    Conjunctivae and sclerae are normal  ENT:    The nose is normal    Pinnae are normal    The oropharynx is normal    Uvula is in the midline  Neck:  Normal range of motion    There is no rigidity noted    There is no midline cervical spine " pain/tenderness    Trachea is in the midline    No mass is detected  CV:  Regular rate and underlying rhythm     Normal S1/S2, no S3/S4    No pathological murmur detected  Resp:  Lungs are clear    There is no tachypnea    Non-labored    No rales    No wheezing   GI:  Abdomen is soft, there is no rigidity    The patient does have an area of blanchable skin, mild erythema, and almost a bruised appearance just above the Steri-Strip incision.  The differential diagnosis includes early skin infection such as cellulitis or some area of postoperative bruising.  I have encircled this with a skin marker and timed and dated it so that this can be watched carefully.  It is not particularly tender at this time.    No distension    No tympani    No rebound tenderness     Non-surgical without peritoneal features  MS:  Normal muscular tone    Symmetric motor strength    No major joint effusions    No asymmetric leg swelling, no calf tenderness  Skin:  No rash or acute skin lesions noted  Neuro: Speech is normal and fluent  Psych:  Awake. Alert.      Normal affect.  Appropriate interactions.  Lymph: No anterior cervical lymphadenopathy noted      Emergency Department Course     Due to hospital and departmental capacity constraints and prolonged wait times, this patient was evaluated in non-traditional circumstances such as in triage/waiting room, a hallway, etc. I explained the option to wait for a traditional treatment space and apologized for the inconvenience. Given the circumstances, every attempt was made to provide for the patient's comfort and privacy and to perform the most thorough evaluation possible.    Laboratory:  Labs Ordered and Resulted from Time of ED Arrival to Time of ED Departure   CBC WITH PLATELETS - Abnormal       Result Value    WBC Count 14.7 (*)     RBC Count 3.60 (*)     Hemoglobin 9.5 (*)     Hematocrit 29.8 (*)     MCV 83      MCH 26.4 (*)     MCHC 31.9      RDW 13.4      Platelet Count 277     AST -  Abnormal    AST 50 (*)    ALT - Abnormal    ALT 38 (*)    CREATININE - Normal    Creatinine 0.54      GFR Estimate >90     CREATININE SERUM - Normal    Creatinine 0.59     PROTEIN RANDOM URINE    Total Protein Urine mg/dL <6.0      Total Protein UR MG/MG CR        Creatinine Urine mg/dL 24.0     TYPE AND SCREEN, ADULT    ABO/RH(D) A POS      Antibody Screen Negative      SPECIMEN EXPIRATION DATE [id number]     ABO/RH TYPE AND SCREEN   CREATININE CLEARANCE        Emergency Department Course & Assessments:         Interventions:  Medications   labetalol (NORMODYNE/TRANDATE) injection 20-80 mg (has no administration in time range)   lidocaine 1 % 0.1-1 mL (has no administration in time range)   lidocaine (LMX4) cream (has no administration in time range)   sodium chloride (PF) 0.9% PF flush 3 mL (3 mLs Intracatheter $Given 2/23/23 1408)   sodium chloride (PF) 0.9% PF flush 3 mL (has no administration in time range)   dextrose 5% in lactated ringers infusion ( Intravenous $New Bag 2/23/23 1356)   ketorolac (TORADOL) injection 30 mg (has no administration in time range)   ibuprofen (ADVIL/MOTRIN) tablet 800 mg (has no administration in time range)   oxyCODONE (ROXICODONE) tablet 5 mg (has no administration in time range)   senna-docusate (SENOKOT-S/PERICOLACE) 8.6-50 MG per tablet 1 tablet (has no administration in time range)     Or   senna-docusate (SENOKOT-S/PERICOLACE) 8.6-50 MG per tablet 2 tablet (has no administration in time range)   bisacodyl (DULCOLAX) suppository 10 mg (has no administration in time range)   sodium phosphate (FLEET ENEMA) 1 enema (has no administration in time range)   metoclopramide (REGLAN) injection 10 mg (has no administration in time range)     Or   metoclopramide (REGLAN) tablet 10 mg (has no administration in time range)   ondansetron (ZOFRAN ODT) ODT tab 4 mg (has no administration in time range)     Or   ondansetron (ZOFRAN) injection 4 mg (has no administration in time range)    prochlorperazine (COMPAZINE) injection 10 mg (has no administration in time range)     Or   prochlorperazine (COMPAZINE) tablet 10 mg (has no administration in time range)     Or   prochlorperazine (COMPAZINE) suppository 25 mg (has no administration in time range)   hydrocortisone (Perianal) (ANUSOL-HC) 2.5 % cream (has no administration in time range)   lanolin cream (has no administration in time range)   simethicone (MYLICON) chewable tablet 80 mg (has no administration in time range)   magnesium hydroxide (MILK OF MAGNESIA) suspension 30 mL (has no administration in time range)   No MMR Needed -  Assessment: Patient does not need MMR vaccine (has no administration in time range)   No Tdap Needed - Assessment: Patient does not need Tdap vaccine (has no administration in time range)   diphenhydrAMINE (BENADRYL) capsule 25 mg (has no administration in time range)     Or   diphenhydrAMINE (BENADRYL) injection 25 mg (has no administration in time range)   magnesium sulfate infusion (2 g/hr Intravenous $New Bag 2/23/23 1419)   labetalol (NORMODYNE) tablet 100 mg (100 mg Oral $Given 2/23/23 1418)   labetalol (NORMODYNE/TRANDATE) injection 10 mg (10 mg Intravenous $Given 2/23/23 1358)        Independent Interpretation (X-rays, CTs, rhythm strip):  None      Consultations/Discussion of Management or Tests:  1332 I consulted with the patients OB, Dr. Ferreira about the patient and plan of care     Social Determinants of Health affecting care:   None    Assessments:  1110 I examined the patient and obtained history   1359 I rechecked the patient and explained results    Disposition:  The patient was admitted to the hospital under the care of Dr. Ferreira.     Impression & Plan    CMS Diagnoses: None    Medical Decision Making:  This patient presents to the emergency department in the postpartum period with worsening hypertension.  She is also had low-grade headache and is noted to have low-grade liver function test  abnormality.  The differential diagnosis includes preeclampsia in the postpartum period and pregnancy-induced hypertension.  There is also the possibility of an early charlie-incisional skin infection that will require close monitoring.  The differential diagnosis today includes postoperative bruising versus early cellulitis.  There is no palpable abscess or fluctuance in that area.  Given the patient's accelerated hypertension, and liver enzyme elevation and low-grade headache, the patient will be started on magnesium infusion.  She will also be placed on a protocol with labetalol.  Close observation of the abdominal area is warranted.  She will be admitted to the hospital at this time.      Diagnosis:    ICD-10-CM    1. Postpartum hypertension  O16.5       2. Anemia due to blood loss, acute  D62       3. Elevated LFTs  R79.89       Mild erythema/bruising near the  incision, requiring close monitoring for extension    Scribe Disclosure:  Graeme MATA, am serving as a scribe at 11:22 AM on 2023 to document services personally performed by Reynaldo Burns MD based on my observations and the provider's statements to me.     2023   Reynaldo Burns MD Rock, Michael P, MD  [phone]

## 2023-02-24 LAB
ALBUMIN SERPL BCG-MCNC: 3.1 G/DL (ref 3.5–5.2)
ALP SERPL-CCNC: 97 U/L (ref 35–104)
ALT SERPL W P-5'-P-CCNC: 57 U/L (ref 10–35)
ANION GAP SERPL CALCULATED.3IONS-SCNC: 10 MMOL/L (ref 7–15)
AST SERPL W P-5'-P-CCNC: 54 U/L (ref 10–35)
BILIRUB SERPL-MCNC: 0.2 MG/DL
BUN SERPL-MCNC: 6.5 MG/DL (ref 6–20)
CALCIUM SERPL-MCNC: 7.5 MG/DL (ref 8.6–10)
CHLORIDE SERPL-SCNC: 105 MMOL/L (ref 98–107)
CREAT SERPL-MCNC: 0.54 MG/DL (ref 0.51–0.95)
DEPRECATED HCO3 PLAS-SCNC: 23 MMOL/L (ref 22–29)
ERYTHROCYTE [DISTWIDTH] IN BLOOD BY AUTOMATED COUNT: 13.8 % (ref 10–15)
GFR SERPL CREATININE-BSD FRML MDRD: >90 ML/MIN/1.73M2
GLUCOSE SERPL-MCNC: 112 MG/DL (ref 70–99)
HCT VFR BLD AUTO: 28.2 % (ref 35–47)
HGB BLD-MCNC: 8.9 G/DL (ref 11.7–15.7)
MAGNESIUM SERPL-MCNC: 5.6 MG/DL (ref 1.7–2.3)
MCH RBC QN AUTO: 26.3 PG (ref 26.5–33)
MCHC RBC AUTO-ENTMCNC: 31.6 G/DL (ref 31.5–36.5)
MCV RBC AUTO: 83 FL (ref 78–100)
PLATELET # BLD AUTO: 251 10E3/UL (ref 150–450)
POTASSIUM SERPL-SCNC: 3.7 MMOL/L (ref 3.4–5.3)
PROT SERPL-MCNC: 5.8 G/DL (ref 6.4–8.3)
RBC # BLD AUTO: 3.38 10E6/UL (ref 3.8–5.2)
SODIUM SERPL-SCNC: 138 MMOL/L (ref 136–145)
WBC # BLD AUTO: 11.3 10E3/UL (ref 4–11)

## 2023-02-24 PROCEDURE — 36415 COLL VENOUS BLD VENIPUNCTURE: CPT | Performed by: OBSTETRICS & GYNECOLOGY

## 2023-02-24 PROCEDURE — 258N000003 HC RX IP 258 OP 636: Performed by: OBSTETRICS & GYNECOLOGY

## 2023-02-24 PROCEDURE — 120N000012 HC R&B POSTPARTUM

## 2023-02-24 PROCEDURE — 250N000013 HC RX MED GY IP 250 OP 250 PS 637: Performed by: OBSTETRICS & GYNECOLOGY

## 2023-02-24 PROCEDURE — 85014 HEMATOCRIT: CPT | Performed by: OBSTETRICS & GYNECOLOGY

## 2023-02-24 PROCEDURE — 250N000011 HC RX IP 250 OP 636: Performed by: OBSTETRICS & GYNECOLOGY

## 2023-02-24 PROCEDURE — 83735 ASSAY OF MAGNESIUM: CPT | Performed by: OBSTETRICS & GYNECOLOGY

## 2023-02-24 PROCEDURE — 80053 COMPREHEN METABOLIC PANEL: CPT | Performed by: OBSTETRICS & GYNECOLOGY

## 2023-02-24 RX ORDER — NALOXONE HYDROCHLORIDE 0.4 MG/ML
0.4 INJECTION, SOLUTION INTRAMUSCULAR; INTRAVENOUS; SUBCUTANEOUS
Status: DISCONTINUED | OUTPATIENT
Start: 2023-02-24 | End: 2023-02-26 | Stop reason: HOSPADM

## 2023-02-24 RX ORDER — DIPHENHYDRAMINE HYDROCHLORIDE 50 MG/ML
50 INJECTION INTRAMUSCULAR; INTRAVENOUS
Status: DISCONTINUED | OUTPATIENT
Start: 2023-02-24 | End: 2023-02-26 | Stop reason: HOSPADM

## 2023-02-24 RX ORDER — METHYLPREDNISOLONE SODIUM SUCCINATE 125 MG/2ML
125 INJECTION, POWDER, LYOPHILIZED, FOR SOLUTION INTRAMUSCULAR; INTRAVENOUS
Status: DISCONTINUED | OUTPATIENT
Start: 2023-02-24 | End: 2023-02-26 | Stop reason: HOSPADM

## 2023-02-24 RX ORDER — NALOXONE HYDROCHLORIDE 0.4 MG/ML
0.2 INJECTION, SOLUTION INTRAMUSCULAR; INTRAVENOUS; SUBCUTANEOUS
Status: DISCONTINUED | OUTPATIENT
Start: 2023-02-24 | End: 2023-02-26 | Stop reason: HOSPADM

## 2023-02-24 RX ORDER — BUPROPION HYDROCHLORIDE 150 MG/1
150 TABLET ORAL EVERY MORNING
Status: DISCONTINUED | OUTPATIENT
Start: 2023-02-25 | End: 2023-02-26 | Stop reason: HOSPADM

## 2023-02-24 RX ADMIN — LABETALOL HYDROCHLORIDE 200 MG: 200 TABLET, FILM COATED ORAL at 07:29

## 2023-02-24 RX ADMIN — MAGNESIUM SULFATE IN WATER 2 G/HR: 40 INJECTION, SOLUTION INTRAVENOUS at 00:15

## 2023-02-24 RX ADMIN — IBUPROFEN 800 MG: 400 TABLET ORAL at 19:45

## 2023-02-24 RX ADMIN — MAGNESIUM SULFATE IN WATER 2 G/HR: 40 INJECTION, SOLUTION INTRAVENOUS at 09:45

## 2023-02-24 RX ADMIN — IRON SUCROSE 200 MG: 20 INJECTION, SOLUTION INTRAVENOUS at 18:50

## 2023-02-24 RX ADMIN — IBUPROFEN 800 MG: 400 TABLET ORAL at 03:34

## 2023-02-24 RX ADMIN — SENNOSIDES AND DOCUSATE SODIUM 1 TABLET: 50; 8.6 TABLET ORAL at 07:29

## 2023-02-24 RX ADMIN — LABETALOL HYDROCHLORIDE 200 MG: 200 TABLET, FILM COATED ORAL at 19:45

## 2023-02-24 RX ADMIN — IBUPROFEN 800 MG: 400 TABLET ORAL at 11:34

## 2023-02-24 RX ADMIN — SODIUM CHLORIDE, POTASSIUM CHLORIDE, SODIUM LACTATE AND CALCIUM CHLORIDE: 600; 310; 30; 20 INJECTION, SOLUTION INTRAVENOUS at 04:59

## 2023-02-24 ASSESSMENT — ACTIVITIES OF DAILY LIVING (ADL)
ADLS_ACUITY_SCORE: 35

## 2023-02-24 NOTE — PLAN OF CARE
Pt arrived from ED via cart and was able to ambulate and get into bed with a SBA.  Pt is A&O x3, lungs clear bilat, BS positive.  Pt c/o headache, but denies epigastric pain and visual disturbances at this time.  She states her bleeding is scant.  Her incision is c/d/I with steri strips, ER MD did outline some bruising above her incision.  Pt is on Mg+ 2 gr/hr and is tolerating the infusion, only complaint is she is tired.  Dr Ferreira here at bedside and did reflexes on pt after I thought that they were brisk, she provider notification note.  She has signed the rooming in paperwork and  Lewis and baby Sammie are in room with her.  Pt has brought her own pump and bottle supplies.  Will cont to monitor.

## 2023-02-24 NOTE — PLAN OF CARE
Vital signs stable. Magnesium sulfate infusing with LR. See MAR. Lung sounds clear. C/o  headache pain 4/10, ibuprofen given. Denies blurry vision, dizziness and epigastric pain. Reflexes + 2 bilaterally, no clonus.  Will continue with current plan of care.

## 2023-02-24 NOTE — PROGRESS NOTES
Obstetrics Rounding Note    S: Doing well overall. No HA/vision changes. Doesn't feel well on mag sulfate. Pumping. Minimal lochia. No swelling noted. Urinating large volumes.       O:   Vitals:    02/24/23 0334 02/24/23 0723 02/24/23 1134 02/24/23 1545   BP: 123/79 119/84 127/78 123/79   BP Location: Left arm Left arm Left arm Left arm   Patient Position: Supine Semi-Galvan's Semi-Galvan's Semi-Galvan's   Cuff Size: Adult Regular      Pulse: 77 85 81 76   Resp: 16 16 16 16   Temp: 97.7  F (36.5  C) 98  F (36.7  C) 98.4  F (36.9  C) 98.2  F (36.8  C)   TempSrc: Oral Oral Oral Oral   SpO2: 99% 98%     Weight: 84.7 kg (186 lb 12.8 oz)      Height:         BPs in last 24hrs 113-137/65-96. Have been normal range since 7:30pm yesterday    UOP excellent, voiding 250ml/4hr.     Gen: AOx3, NAD  Chest: non-labored breathing  Abd: soft, ND  Ext: no calf ttp, no edema    Labs:         Hemoglobin   Date Value Ref Range Status   02/24/2023 8.9 (L) 11.7 - 15.7 g/dL Final   02/23/2023 9.5 (L) 11.7 - 15.7 g/dL Final   06/14/2021 13.5 11.7 - 15.7 g/dL Final     ALT/AST:   2/19 16/19->2/23 38/50->2/23 51/59->2/24 57/54    Cr:  0.54, has been wnl    H/H:  2/21 7.7 -> 2/22 8.6 ->2/23 9.5/29.8-> 2/24 8.9/28.2    Plt:  251, have been wnl range      Meds:  Current Facility-Administered Medications   Medication     [START ON 2/25/2023] bisacodyl (DULCOLAX) suppository 10 mg     diphenhydrAMINE (BENADRYL) capsule 25 mg    Or     diphenhydrAMINE (BENADRYL) injection 25 mg     hydrocortisone (Perianal) (ANUSOL-HC) 2.5 % cream     ibuprofen (ADVIL/MOTRIN) tablet 800 mg     labetalol (NORMODYNE) tablet 200 mg     labetalol (NORMODYNE/TRANDATE) injection 20-80 mg     lactated ringers infusion     lanolin cream     lidocaine (LMX4) cream     lidocaine 1 % 0.1-1 mL     magnesium hydroxide (MILK OF MAGNESIA) suspension 30 mL     metoclopramide (REGLAN) injection 10 mg    Or     metoclopramide (REGLAN) tablet 10 mg     No MMR Needed -  Assessment:  Patient does not need MMR vaccine     No Tdap Needed - Assessment: Patient does not need Tdap vaccine     ondansetron (ZOFRAN ODT) ODT tab 4 mg    Or     ondansetron (ZOFRAN) injection 4 mg     oxyCODONE (ROXICODONE) tablet 5 mg     prochlorperazine (COMPAZINE) injection 10 mg    Or     prochlorperazine (COMPAZINE) tablet 10 mg    Or     prochlorperazine (COMPAZINE) suppository 25 mg     senna-docusate (SENOKOT-S/PERICOLACE) 8.6-50 MG per tablet 1 tablet    Or     senna-docusate (SENOKOT-S/PERICOLACE) 8.6-50 MG per tablet 2 tablet     simethicone (MYLICON) chewable tablet 80 mg     sodium chloride (PF) 0.9% PF flush 3 mL     sodium chloride (PF) 0.9% PF flush 3 mL     [START ON 2/25/2023] sodium phosphate (FLEET ENEMA) 1 enema       A/P: HD#2, POD#4 readmitted for preeclampsia w/ severe features by BPs and new abnormal LFTs.   - PreE. Will complete 24hr of mag this afternoon around 2pm. Will then discontinue. Has had good UOP, nl mag level.   BPs in normal range x 24hrs approximately, is on labetalol 200mg BID.  LFT stable roughly, will cont to monitor and repeat labs in am.  Discussed with patient and family will need to be patient as we eval her blood pressure control and adjust meds as needed. Can anticipate at least 1 additional day in hospital, perhaps more. Will need to have BPs <150/100 for at least 24hrs prior to discharge.  -Hx depression/anxiety, on wellbutrin. Will continue.  -SCDs while in bed.   -Continue routine care.    Bing Pickens Masters, DO  February 24, 2023

## 2023-02-24 NOTE — PLAN OF CARE
Pt VSS, denies headache at this time.  States her eyes feel heavy and noted some blurred vision, Mg level done per Dr Mcgraw.  Plan to discontinue magnesium sulfate at 24 hours this afternoon.  Pt is steady and up to bathroom with , reminded pt that she should call for assistance to the bathroom is  is unable to help her.  Pt is voiding in good amounts.  Incision CDI, pt states bleeding is minimal, no clots.   at bedside, assisting with providing cares for infant.  No concerns at this time.

## 2023-02-25 LAB
ALBUMIN SERPL BCG-MCNC: 3.3 G/DL (ref 3.5–5.2)
ALP SERPL-CCNC: 100 U/L (ref 35–104)
ALT SERPL W P-5'-P-CCNC: 56 U/L (ref 10–35)
AST SERPL W P-5'-P-CCNC: 37 U/L (ref 10–35)
BILIRUB DIRECT SERPL-MCNC: <0.2 MG/DL (ref 0–0.3)
BILIRUB SERPL-MCNC: 0.2 MG/DL
ERYTHROCYTE [DISTWIDTH] IN BLOOD BY AUTOMATED COUNT: 14.3 % (ref 10–15)
HCT VFR BLD AUTO: 30.3 % (ref 35–47)
HGB BLD-MCNC: 9.5 G/DL (ref 11.7–15.7)
MCH RBC QN AUTO: 26.8 PG (ref 26.5–33)
MCHC RBC AUTO-ENTMCNC: 31.4 G/DL (ref 31.5–36.5)
MCV RBC AUTO: 85 FL (ref 78–100)
PLATELET # BLD AUTO: 277 10E3/UL (ref 150–450)
PROT SERPL-MCNC: 6.2 G/DL (ref 6.4–8.3)
RBC # BLD AUTO: 3.55 10E6/UL (ref 3.8–5.2)
WBC # BLD AUTO: 9.7 10E3/UL (ref 4–11)

## 2023-02-25 PROCEDURE — 250N000013 HC RX MED GY IP 250 OP 250 PS 637: Performed by: OBSTETRICS & GYNECOLOGY

## 2023-02-25 PROCEDURE — 85027 COMPLETE CBC AUTOMATED: CPT | Performed by: OBSTETRICS & GYNECOLOGY

## 2023-02-25 PROCEDURE — 80076 HEPATIC FUNCTION PANEL: CPT | Performed by: OBSTETRICS & GYNECOLOGY

## 2023-02-25 PROCEDURE — 120N000012 HC R&B POSTPARTUM

## 2023-02-25 PROCEDURE — 258N000003 HC RX IP 258 OP 636: Performed by: OBSTETRICS & GYNECOLOGY

## 2023-02-25 PROCEDURE — 250N000011 HC RX IP 250 OP 636: Performed by: OBSTETRICS & GYNECOLOGY

## 2023-02-25 PROCEDURE — 36415 COLL VENOUS BLD VENIPUNCTURE: CPT | Performed by: OBSTETRICS & GYNECOLOGY

## 2023-02-25 RX ORDER — LABETALOL 200 MG/1
200 TABLET, FILM COATED ORAL EVERY 8 HOURS SCHEDULED
Status: DISCONTINUED | OUTPATIENT
Start: 2023-02-25 | End: 2023-02-26 | Stop reason: HOSPADM

## 2023-02-25 RX ADMIN — IRON SUCROSE 200 MG: 20 INJECTION, SOLUTION INTRAVENOUS at 18:23

## 2023-02-25 RX ADMIN — LABETALOL HYDROCHLORIDE 200 MG: 200 TABLET, FILM COATED ORAL at 08:13

## 2023-02-25 RX ADMIN — LABETALOL HYDROCHLORIDE 200 MG: 200 TABLET, FILM COATED ORAL at 22:16

## 2023-02-25 RX ADMIN — IBUPROFEN 800 MG: 400 TABLET ORAL at 19:45

## 2023-02-25 RX ADMIN — IBUPROFEN 800 MG: 400 TABLET ORAL at 10:22

## 2023-02-25 RX ADMIN — BUPROPION HYDROCHLORIDE 150 MG: 150 TABLET, FILM COATED, EXTENDED RELEASE ORAL at 10:22

## 2023-02-25 RX ADMIN — LABETALOL HYDROCHLORIDE 200 MG: 200 TABLET, FILM COATED ORAL at 15:28

## 2023-02-25 RX ADMIN — IBUPROFEN 800 MG: 400 TABLET ORAL at 03:03

## 2023-02-25 ASSESSMENT — ACTIVITIES OF DAILY LIVING (ADL)
ADLS_ACUITY_SCORE: 35
ADLS_ACUITY_SCORE: 20
ADLS_ACUITY_SCORE: 35
ADLS_ACUITY_SCORE: 20
ADLS_ACUITY_SCORE: 35

## 2023-02-25 NOTE — PROGRESS NOTES
Obstetrics Rounding Note    S: Feeling fine, no ha/vision changes      O:   Vitals:    02/24/23 2300 02/25/23 0305 02/25/23 0746 02/25/23 1330   BP: 125/76 114/63 (!) 134/94 118/77   BP Location: Right arm  Right arm    Patient Position:       Cuff Size:       Pulse: 75 60 60    Resp: 16 14 16    Temp: 98.2  F (36.8  C)  98  F (36.7  C)    TempSrc: Oral  Oral    SpO2:       Weight:       Height:         Gen: AOx3, NAD  Chest: non-labored breathing  Abd: soft, ND, U-2, incision with steri strips no erythema/discharge  Ext: no calf ttp, no edema    Labs:         Hemoglobin   Date Value Ref Range Status   02/25/2023 9.5 (L) 11.7 - 15.7 g/dL Final   02/24/2023 8.9 (L) 11.7 - 15.7 g/dL Final   06/14/2021 13.5 11.7 - 15.7 g/dL Final     ALT/AST:   2/19 16/19->2/23 38/50->2/23 51/59->2/24 57/54->2/25 56/37    Cr:  0.54, has been wnl    H/H:  2/21 7.7 -> 2/22 8.6 ->2/23 9.5/29.8-> 2/24 8.9/28.2->2/25 9.5/30.3    Plt:  251, have been wnl range      Meds:  Current Facility-Administered Medications   Medication     bisacodyl (DULCOLAX) suppository 10 mg     buPROPion (WELLBUTRIN XL) 24 hr tablet 150 mg     diphenhydrAMINE (BENADRYL) capsule 25 mg    Or     diphenhydrAMINE (BENADRYL) injection 25 mg     diphenhydrAMINE (BENADRYL) injection 50 mg     EPINEPHrine (ADRENALIN) kit 0.3 mg     famotidine (PEPCID) injection 20 mg     hydrocortisone (Perianal) (ANUSOL-HC) 2.5 % cream     ibuprofen (ADVIL/MOTRIN) tablet 800 mg     iron sucrose (VENOFER) 200 mg in sodium chloride 0.9 % 120 mL intermittent infusion     labetalol (NORMODYNE) tablet 200 mg     labetalol (NORMODYNE/TRANDATE) injection 20-80 mg     lanolin cream     lidocaine (LMX4) cream     lidocaine 1 % 0.1-1 mL     magnesium hydroxide (MILK OF MAGNESIA) suspension 30 mL     methylPREDNISolone sodium succinate (solu-MEDROL) injection 125 mg     metoclopramide (REGLAN) injection 10 mg    Or     metoclopramide (REGLAN) tablet 10 mg     naloxone (NARCAN) injection 0.2 mg     Or     naloxone (NARCAN) injection 0.4 mg    Or     naloxone (NARCAN) injection 0.2 mg    Or     naloxone (NARCAN) injection 0.4 mg     No MMR Needed -  Assessment: Patient does not need MMR vaccine     No Tdap Needed - Assessment: Patient does not need Tdap vaccine     ondansetron (ZOFRAN ODT) ODT tab 4 mg    Or     ondansetron (ZOFRAN) injection 4 mg     oxyCODONE (ROXICODONE) tablet 5 mg     prochlorperazine (COMPAZINE) injection 10 mg    Or     prochlorperazine (COMPAZINE) tablet 10 mg    Or     prochlorperazine (COMPAZINE) suppository 25 mg     senna-docusate (SENOKOT-S/PERICOLACE) 8.6-50 MG per tablet 1 tablet    Or     senna-docusate (SENOKOT-S/PERICOLACE) 8.6-50 MG per tablet 2 tablet     simethicone (MYLICON) chewable tablet 80 mg     sodium chloride (PF) 0.9% PF flush 3 mL     sodium chloride (PF) 0.9% PF flush 3 mL     sodium phosphate (FLEET ENEMA) 1 enema       A/P: HD#3, POD#5 readmitted for preeclampsia w/ severe features by BPs and new abnormal LFTs.   - PreE. S/p 24hr of mag . BPs in normal range with two elev DBP this am. Change labetalol to 200mg TID.  LFT stable/decreasing, will cont to monitor   patient and partner aware of plan to monitor blood pressure and ensure control prior to safe discharge. Will need to have BPs <150/100 for at least 24hrs prior to discharge.  -Hx depression/anxiety, on wellbutrin. Will continue.  -SCDs while in bed.   -Continue routine care.    Bing Pickens Masters, DO

## 2023-02-25 NOTE — PLAN OF CARE
Pt BP's WNL, denies any symptoms of headache, visual changes or epigastric pain.  Edema unchanged.  Good UOP.  Pt was up to shower this afternoon and tolerated well.   at bedside and performing cares of baby.  IV venofer started this evening.  Continuing with PO meds for BP control.  No concerns at this time.

## 2023-02-26 VITALS
OXYGEN SATURATION: 98 % | BODY MASS INDEX: 27.17 KG/M2 | RESPIRATION RATE: 16 BRPM | HEIGHT: 69 IN | DIASTOLIC BLOOD PRESSURE: 92 MMHG | SYSTOLIC BLOOD PRESSURE: 148 MMHG | TEMPERATURE: 97.6 F | WEIGHT: 183.42 LBS | HEART RATE: 70 BPM

## 2023-02-26 PROCEDURE — 250N000013 HC RX MED GY IP 250 OP 250 PS 637: Performed by: OBSTETRICS & GYNECOLOGY

## 2023-02-26 RX ORDER — LABETALOL 100 MG/1
200 TABLET, FILM COATED ORAL 3 TIMES DAILY
Qty: 90 TABLET | Refills: 3 | Status: SHIPPED | OUTPATIENT
Start: 2023-02-26 | End: 2023-03-03

## 2023-02-26 RX ADMIN — IBUPROFEN 800 MG: 400 TABLET ORAL at 01:47

## 2023-02-26 RX ADMIN — LABETALOL HYDROCHLORIDE 200 MG: 200 TABLET, FILM COATED ORAL at 14:14

## 2023-02-26 RX ADMIN — LABETALOL HYDROCHLORIDE 200 MG: 200 TABLET, FILM COATED ORAL at 06:58

## 2023-02-26 RX ADMIN — BUPROPION HYDROCHLORIDE 150 MG: 150 TABLET, FILM COATED, EXTENDED RELEASE ORAL at 07:55

## 2023-02-26 RX ADMIN — IBUPROFEN 800 MG: 400 TABLET ORAL at 11:32

## 2023-02-26 ASSESSMENT — ACTIVITIES OF DAILY LIVING (ADL)
ADLS_ACUITY_SCORE: 20

## 2023-02-26 NOTE — PLAN OF CARE
Vital signs are stable.  BP has been 121/84 at 1500 and 128/77 at 1830.  Denies headaches, visual disturbances, and epigastric pain.  Voiding freely.  She did have one episode of feeling dizziness when getting out bed around 1550.  While feeling dizziness, her BP was 139/94 at sitting and 141/101 at standing.  Dizziness resolved after patient rest while sitting and standing.  Patient was educated on the implication of orthostatic BP this shift.  She has been getting out of bed slowly.  No more dizziness reported.  Questions answered.  Continue current plan of care.

## 2023-02-26 NOTE — PROVIDER NOTIFICATION
Dr. Mcgraw notified that pt's IV infiltrated while infusing Venofer,Hgb 9.5, /83 & pt would like IV left out.  Orders obtained to Discontinue Venofer & may leave IV out. Restart IV if needed for elevated BP.

## 2023-02-26 NOTE — PLAN OF CARE
VS WDL.Blood pressures overnight were 117/68 and 128/75. 0600 labetalol 200 mg was held at 0600 due to heart rate of 58- MD aware and ordered for labetalol to be held an hour and recheck vitals at 0700. Reports a headache intermittently overnight- MD aware and patient encouraged to rest and take ibuprofen. No reports of vision changes or epigastric pain. Voiding spontaneously. Ambulating independently without report of dizziness. Pain managed with ibuprofen. Incision clean, dry, intact. Lung sounds are clear.Spouse and infant at bedside.

## 2023-02-26 NOTE — PROGRESS NOTES
Obstetrics Rounding Note    S: Feeling fine, no ha/vision changes. No specific concerns      O:   Vitals:    02/26/23 0342 02/26/23 0559 02/26/23 0657 02/26/23 0750   BP:  128/75 118/70    BP Location:  Left arm Right arm    Patient Position:  Semi-Galvan's Semi-Galvan's    Cuff Size:  Adult Regular Adult Regular    Pulse:  58 64 70   Resp:  16  16   Temp:    98.4  F (36.9  C)   TempSrc:    Oral   SpO2:       Weight: 83.2 kg (183 lb 6.8 oz)      Height:         Gen: AOx3, NAD  Chest: non-labored breathing  Abd: soft, ND, U-2, incision with steri strips no erythema/discharge  Ext: no calf ttp, no edema    Labs:         Hemoglobin   Date Value Ref Range Status   02/25/2023 9.5 (L) 11.7 - 15.7 g/dL Final   02/24/2023 8.9 (L) 11.7 - 15.7 g/dL Final   06/14/2021 13.5 11.7 - 15.7 g/dL Final     ALT/AST:   2/19 16/19->2/23 38/50->2/23 51/59->2/24 57/54->2/25 56/37    Cr:  0.54, has been wnl    H/H:  2/21 7.7 -> 2/22 8.6 ->2/23 9.5/29.8-> 2/24 8.9/28.2->2/25 9.5/30.3    Plt:  251, have been wnl range      Meds:  Current Facility-Administered Medications   Medication     bisacodyl (DULCOLAX) suppository 10 mg     buPROPion (WELLBUTRIN XL) 24 hr tablet 150 mg     diphenhydrAMINE (BENADRYL) capsule 25 mg    Or     diphenhydrAMINE (BENADRYL) injection 25 mg     diphenhydrAMINE (BENADRYL) injection 50 mg     EPINEPHrine (ADRENALIN) kit 0.3 mg     famotidine (PEPCID) injection 20 mg     hydrocortisone (Perianal) (ANUSOL-HC) 2.5 % cream     ibuprofen (ADVIL/MOTRIN) tablet 800 mg     labetalol (NORMODYNE) tablet 200 mg     labetalol (NORMODYNE/TRANDATE) injection 20-80 mg     lanolin cream     lidocaine (LMX4) cream     lidocaine 1 % 0.1-1 mL     magnesium hydroxide (MILK OF MAGNESIA) suspension 30 mL     methylPREDNISolone sodium succinate (solu-MEDROL) injection 125 mg     metoclopramide (REGLAN) injection 10 mg    Or     metoclopramide (REGLAN) tablet 10 mg     naloxone (NARCAN) injection 0.2 mg    Or     naloxone (NARCAN)  injection 0.4 mg    Or     naloxone (NARCAN) injection 0.2 mg    Or     naloxone (NARCAN) injection 0.4 mg     No MMR Needed -  Assessment: Patient does not need MMR vaccine     No Tdap Needed - Assessment: Patient does not need Tdap vaccine     ondansetron (ZOFRAN ODT) ODT tab 4 mg    Or     ondansetron (ZOFRAN) injection 4 mg     oxyCODONE (ROXICODONE) tablet 5 mg     prochlorperazine (COMPAZINE) injection 10 mg    Or     prochlorperazine (COMPAZINE) tablet 10 mg    Or     prochlorperazine (COMPAZINE) suppository 25 mg     senna-docusate (SENOKOT-S/PERICOLACE) 8.6-50 MG per tablet 1 tablet    Or     senna-docusate (SENOKOT-S/PERICOLACE) 8.6-50 MG per tablet 2 tablet     simethicone (MYLICON) chewable tablet 80 mg     sodium chloride (PF) 0.9% PF flush 3 mL     sodium chloride (PF) 0.9% PF flush 3 mL     sodium phosphate (FLEET ENEMA) 1 enema       A/P: HD#4, POD#6 readmitted for preeclampsia w/ severe features by BPs and new abnormal LFTs.   - PreE. S/p 24hr of mag . BPs in normal range with few mildly elev BPs. Meds changed yesterday morning to TID dosing 200mg.   LFT stable/decreasing.  Will cont to monitor BPs through the mid afternoon, discharge likely today with f/u in clinic tomorrow for blood pressure check with the RNs  -Hx depression/anxiety, on wellbutrin. Will continue.  -SCDs while in bed.   -Continue routine care.  -Questions answered.     Bing Pickens Masters, DO

## 2023-02-26 NOTE — DISCHARGE INSTRUCTIONS
HYPERTENSION PRECAUTIONS FOR POSTPARTUM PATIENTS  You have been diagnosed with a high blood pressure condition, such as gestational hypertension or preeclampsia.  This condition occurs in pregnancy, but effects may persist after delivery. If you have symptoms such as blurry vision, increasing shortness of breath, abdominal pain or headache that does not resolve, these may be due to preeclampsia. Please contact your provider if you have these symptoms.     Women with preeclampsia are at increased risk of heart disease later in life. We recommend a heart healthy lifestyle including regular exercise (at least 150 minutes of moderate intensity exercise per week), a healthy diet, and regular visits with a primary care doctor.   For more information visit: https://www.cdc.gov/physicalactivity.     Blood pressure:  Please check your blood pressure once every day for the next 3 days. Tips for taking your blood pressure:   - Take your blood pressure on your right arm in a seated position with  your arm at rest on your lap.   - No smoking within 20 minutes of taking your blood pressure.   - Take your blood pressure after you have been seated for at least 10 minutes.   -Call us at the clinic [phone] with any abnormal numbers, top number greater than or equal to 150 or bottom number   greater than or equal to 100    The top number should be more than 80 and less than 150 and the bottom number more than 50 and less than 100.    - If the top number is more than 160 and/or the bottom number is more than 110,   remain at rest and repeat the blood pressure in 15 minutes.    - If the top number remains more than 160 and/or the bottom number more than   110 after a second check, please call your doctor or if you cannot reach anyone, go to your nearest emergency room       Symptoms of preeclampsia: Please call your doctor [phone] or seek care immediately if you have any of the following symptoms,   - Headache that does not  improve with rest, Tylenol, or re-hydrating   ?  - Persistently blurry vision    - Chest pain, especially if it does not stop with rest    - Worsening shortness of breath

## 2023-02-26 NOTE — PLAN OF CARE
VSS. Blood pressure elevated with am. BP at 1330 118/77 HR 65. Paged and notified Dr. Mariluz durand to start new order of Labetalol TID. Pain controlled well with Ibuprofen. Independent with cares. Questions answered. Will continue to monitor.

## 2023-02-26 NOTE — PLAN OF CARE
Blood pressures remains elevated,denies symptoms of pre eclampsia.  Dr Mcgraw notified and discharge order given. Clinic will call tomorrow to have her come in for BP check.  Ready for discharge home.

## 2023-02-26 NOTE — PROVIDER NOTIFICATION
Dr. Mcgraw was paged for a status update on this patient for heart rate of 58 before a labetalol dose and an intermittent headache rated 3/10. Pt reports headache is most likely due to overtiredness. Per  RN is to recheck vitals in an hour and hold labetalol until 0700. For the headache the patient is advised to try and rest and take prn ibuprofen every 6 hours.

## 2023-02-26 NOTE — PLAN OF CARE
Vitas stable. Feels well. Pain controlled. Up and about. Voiding in good amounts. Reflexes normal, no clonus. Slight headache reported. Awaiting physician visit for possible discharge home.

## 2023-02-27 ENCOUNTER — ALLIED HEALTH/NURSE VISIT (OUTPATIENT)
Dept: NURSING | Facility: CLINIC | Age: 33
End: 2023-02-27
Payer: COMMERCIAL

## 2023-02-27 ENCOUNTER — PATIENT OUTREACH (OUTPATIENT)
Dept: CARE COORDINATION | Facility: CLINIC | Age: 33
End: 2023-02-27

## 2023-02-27 VITALS — DIASTOLIC BLOOD PRESSURE: 100 MMHG | HEART RATE: 100 BPM | OXYGEN SATURATION: 97 % | SYSTOLIC BLOOD PRESSURE: 153 MMHG

## 2023-02-27 PROCEDURE — 99207 PR NO CHARGE NURSE ONLY: CPT

## 2023-02-27 NOTE — PROGRESS NOTES
Clinic Care Coordination Contact  [facility]: Post-Discharge Note  SITUATION                                                      Admission:    Admission Date: 23   Reason for Admission: severe gestational hypertension  Discharge:   Discharge Date: 23  Discharge Diagnosis: severe gestational hypertension    BACKGROUND                                                      Beata Joseph is a 32 year old female  readmitted due to severe range blood pressures. Beata was admitted at 35+6wga after presenting with severe range blood pressures. She had been receiving care from the [facility] and had a history of white coat hypertension. At 34 weeks she developed severe range hypertension. She had higher readings at home. During her induction she had refractory hypotension after oral labetalol X 1 and after her epidural. She did not respond to 4 doses of ephedrine and to 1.5 L of fluid. She also had late decelerations. Postpartum her blood pressures remained in the normal to mild range. She requested discharge to home on POD#2 due to inclement weather. She reported blood pressures above the threshold range today and presented to the ED when her pressures entered severe range at home. Her blood pressures were 160/112 mm Hg. In the ED her blood pressures initially self corrected to the mild range but she was given one dose of 10 mg IV labetalol and also 100 mg of oral labetalol as well. She had chest tightness on presentation that has resolved with improved blood pressures. She has no new visual symptoms and denies epigastric pain. She has no chest pain or shortness of breath. She had floaters in her vision prior to pregnancy. Her labs were notable for newly elevated LFTs that had been normal prior. IV Magnesium was stopped at delivery due to the refractory hypotension and was not given postpartum initially.        ASSESSMENT           Discharge Assessment  How are you doing now that you are home?: Patient  shares that she is doing great. Has been monitoring blood pressure and has had normal readings, even when she is a little anxious. Baby is doing well, eating good. Patient has blood pressure check appt today as well as well baby check. No other needs/concerns at this time. Thanks writer for the call.  How are your symptoms? (Red Flag symptoms escalate to triage hotline per guidelines): Improved  Do you feel your condition is stable enough to be safe at home until your provider visit?: Yes  Does the patient have their discharge instructions? : Yes  Does the patient have questions regarding their discharge instructions? : No  Were you started on any new medications or were there changes to any of your previous medications? : Yes  Does the patient have all of their medications?: Yes  Do you have questions regarding any of your medications? : No  Do you have all of your needed medical supplies or equipment (DME)?  (i.e. oxygen tank, CPAP, cane, etc.): Yes  Discharge follow-up appointment scheduled within 14 calendar days? : Yes  Discharge Follow Up Appointment Date: 02/27/23  Discharge Follow Up Appointment Scheduled with?: Specialty Care Provider    Post-op ([facility] Only)  If the patient had a surgery or procedure, do they have any questions for a nurse?: No    Post-op (Clinicians Only)  Did the patient have surgery or a procedure: No  Fever: No  Chills: No      PLAN                                                      Outpatient Plan:  You have been diagnosed with a high blood pressure condition, such as gestational hypertension or preeclampsia. This  condition occurs in pregnancy, but effects may persist after delivery. If you have symptoms such as blurry vision,  increasing shortness of breath, abdominal pain or headache that does not resolve, these may be due to preeclampsia.  Please contact your provider if you have these symptoms.    Future Appointments   Date Time Provider Department Center   2/27/2023 10:40 AM  WE TRIAGE [facility]   3/10/2023  9:00 AM Radha Parisi, [facility]   4/6/2023  9:40 AM Berta Sykes, [facility]         For any urgent concerns, please contact our 24 hour nurse triage line: [phone])         RAND Mora

## 2023-02-27 NOTE — PROGRESS NOTES
Patient here for blood pressure check per    Currently on Labetalol 200mg three times daily  Was told to monitor blood pressure at home.  At home readings today  117/78  124/84  136/86      BP in clinic:  BP (!) 153/100 (BP Location: Right arm, Patient Position: Sitting, Cuff Size: Adult Regular)   Pulse 100   LMP 06/13/2022   SpO2 97%     Patient is anxious about coming into the clinic. Notices when she has to come into the clinic, can feel BP rise. Has increased anxiety after recent hospital admission regarding BP. Does have lower readings at home.    Denies chest pain or pressure. Denies palpitations. Denies shortness of breath or breathing problems. Some lightheadedness. Denies headache/vision changes. Denies groin/calf/leg pain. Denies swelling of extremities. Normal reflexes.       Per RUPA from Dr.Enadeghe ibanez for patient to monitor BP's at home. Can send readings to us via [employer]. Report any new or concerning  Symptoms. Also if BP is lower if goes below 100/50 should call the clinic. Discussed instructions with patient and verbalizes understanding.    Merary Villar RN

## 2023-03-03 ENCOUNTER — NURSE TRIAGE (OUTPATIENT)
Dept: NURSING | Facility: CLINIC | Age: 33
End: 2023-03-03

## 2023-03-03 NOTE — TELEPHONE ENCOUNTER
"Nurse Triage SBAR    Is this a 2nd Level Triage? YES, LICENSED PRACTITIONER REVIEW IS REQUIRED    Situation:   Beata calling with a pulse of 50 and 51 after taking Labetalol tid.   She feels dizzy when she gets up  Background: Beata is taking Labetalol post partum and states her b/p has been good.    Assessment:   Need parameter to hold Labetalol    Protocol Recommended Disposition:   Call PCP Within 24 Hours    Recommendation:   Dr Olson called back and discontinued Labetalol. Beata called back and notified of medication change.    Paged to provider at 622 pm called back at 624 pm    Does the patient meet one of the following criteria for ADS visit consideration? 16+ years old, with an [facility] PCP     TIP  Providers, please consider if this condition is appropriate for management at one of our Acute and Diagnostic Services sites.     If patient is a good candidate, please use dotphrase <dot>triageresponse and select Refer to ADS to document. Questioning if she should hold a dose. She feels dizzy when she gets up from sitting or standing otherwise she feels good.    Reason for Disposition    Heart rhythm alert (e.g., \"you have irregular heartbeat\") from personal wearable device (e.g., Apple Watch)    Additional Information    Negative: Passed out (i.e., lost consciousness, collapsed and was not responding)    Negative: Shock suspected (e.g., cold/pale/clammy skin, too weak to stand, low BP, rapid pulse)    Negative: Difficult to awaken or acting confused (e.g., disoriented, slurred speech)    Negative: Visible sweat on face or sweat dripping down face    Negative: Unable to walk, or can only walk with assistance (e.g., requires support)    Negative: [1] Received SHOCK from implantable cardiac defibrillator AND [2] persisting symptoms (i.e., palpitations, lightheadedness)    Negative: [1] Dizziness, lightheadedness, or weakness AND [2] heart beating very rapidly (e.g., > 140 / minute)    Negative: [1] Dizziness, " "lightheadedness, or weakness AND [2] heart beating very slowly (e.g., < 50 / minute)    Negative: Sounds like a life-threatening emergency to the triager    Negative: Difficulty breathing    Negative: Dizziness, lightheadedness, or weakness    Negative: [1] Heart beating very rapidly (e.g., > 140 / minute) AND [2] present now  (Exception: during exercise)    Negative: Heart beating very slowly (e.g., < 50 / minute)  (Exception: athlete and heart rate normal for caller)    Negative: New or worsened shortness of breath with activity (dyspnea on exertion)    Negative: Patient sounds very sick or weak to the triager    Negative: [1] Heart beating very rapidly (e.g., > 140 / minute) AND [2] not present now  (Exception: during exercise)    Negative: [1] Skipped or extra beat(s) AND [2] increases with exercise or exertion    Negative: [1] Skipped or extra beat(s) AND [2] occurs 4 or more times per minute    Negative: New or worsened ankle swelling    Negative: History of heart disease (i.e., heart attack, bypass surgery, angina, angioplasty, CHF) (Exception: brief heartbeat symptoms that went away and now feels well)    Negative: Age > 60 years (Exception: brief heartbeat symptoms that went away and now feels well)    Negative: Taking water pill (i.e., diuretic) or heart medication (e.g., digoxin)    Negative: Wearing a \"Holter monitor\" or \"cardiac event monitor\"    Negative: [1] Received SHOCK from implantable cardiac defibrillator AND [2] now feels well    Protocols used: HEART RATE AND HEARTBEAT [id number]      "

## 2023-03-06 ENCOUNTER — TELEPHONE (OUTPATIENT)
Dept: MIDWIFE SERVICES | Facility: CLINIC | Age: 33
End: 2023-03-06
Payer: COMMERCIAL

## 2023-03-06 NOTE — TELEPHONE ENCOUNTER
2/20/23: Delivered C/S  GHTN-Severe    Labetalol 200mg TID    States has been managing her BPs well (120/80s)  Resting HR 50s.  Awaiting to see if patient is symptomatic    Katie Heredia RN on 3/6/2023 at 7:56 AM

## 2023-03-06 NOTE — TELEPHONE ENCOUNTER
Reason for call:  Other   Patient called regarding (reason for call): call back  Additional comments: patient just wants to ask questions about a BP medication she's on.    Phone number to reach patient:  Cell number on file:    Telephone Information:   Mobile [phone]     Best Time:  any    Can we leave a detailed message on this number?  YES    Travel screening: Not Applicable

## 2023-03-06 NOTE — TELEPHONE ENCOUNTER
Called pt and reiterated the instructions and when to call.  Pt verbalized understanding, in agreement with plan, and voiced no further questions.  Sandra Coley RN on 3/6/2023 at 10:12 AM

## 2023-03-07 NOTE — PROGRESS NOTES
Midwife Postpartum 2 Week Visit    Beata Joseph is a 32 year old      This visit took place in person    Delivery date was 2023. She had a   delivery of a viable girl, 36w0d named Sammie, weight 6 pounds 2 oz., with complications of gestational HTN with severe features and PP preeclampsia w/ severe features requiring hospitalization.   .    Information for the patient's :  Sammie Joseph [[id number]]   6 lbs 2.06 oz      APGARs 8 , 8     Since delivery, she has been pumping and bottle feeding.  She has not had any signs of infection, she describes her lochia after 2 weeks as none.  She reports that she thinks her anxiety signs and symptoms have increased PP d/t her experience and PP hospitalization for preeclampsia.  She has established care with therapist that specializes in birth trauma and peripartum mood disorders, feels like it is helping.  Is considering medication change.     She has also been taking her BP at home and reports all of her BPs have been <140/90.  Is currently not taking any antihypertensives.     She is voiding and having bowel movements without difficulty.       Contraception was discussed and patient desires none and oral contraceptives or IUD.   She  has not had intercourse since delivery. She complains of No  perineal discomfort.     Mood is Stable      ROS:  12 point review of systems negative other than symptoms noted below or in the HPI.       Current Outpatient Medications:      buPROPion (WELLBUTRIN XL) 150 MG 24 hr tablet, Take 1 tablet (150 mg) by mouth every morning, Disp: 30 tablet, Rfl: 11     [START ON 3/24/2023] norethindrone (MICRONOR) 0.35 MG tablet, Take 1 tablet (0.35 mg) by mouth daily for 84 days, Disp: 84 tablet, Rfl: 0     omeprazole (PRILOSEC) 20 MG DR capsule, Take 1 capsule (20 mg) by mouth daily, Disp: 90 capsule, Rfl: 3     Prenat w/o L-LH-Lnnyhak-FA-DHA (PNV-DHA PO), , Disp: , Rfl:      ibuprofen (ADVIL/MOTRIN) 800 MG tablet, Take 1 tablet (800  mg) by mouth every 6 hours as needed for other (cramping), Disp: , Rfl: .   OB History    Para Term  AB Living   1 1 0 1 0 1   SAB IAB Ectopic Multiple Live Births   0 0 0 0 1      # Outcome Date GA Lbr Dave/2nd Weight Sex Delivery Anes PTL Lv   1  23 36w0d  2.78 kg (6 lb 2.1 oz) F    CHANDRA      Name: SAPPER,FEMALE-ALLY      Apgar1: 8  Apgar5: 8     Last pap:    Lab Results   Component Value Date    PAP ASC-US 2021     Hgb in hospital was 9.5    EXAM:  BP (!) 138/90   Wt 80.7 kg (178 lb)   LMP 2022   Breastfeeding No   BMI 26.29 kg/m    BMI: Body mass index is 26.29 kg/m .  Constitutional: healthy, alert and no distress    Abdomen: soft, non-tender, LTCS incision healing  PELVIC EXAM:  deferred      ASSESSMENT:   Normal postpartum exam after primary LTCS d/t fetal intolerance of labor during induction of labor for gestational hypertension with severe features  .    ICD-10-CM    1. OCP (oral contraceptive pills) initiation  Z30.011 norethindrone (MICRONOR) 0.35 MG tablet      2. Lactating mother  Z39.1       3. Postpartum anxiety  O99.345     F41.8       4. Routine postpartum follow-up  Z39.2             PLAN:  No results found for any visits on 03/10/23.    Teaching: exercise, birth control, mental health and continuing to monitor BPs at home  Family Planning:mini pill / progesterone only pill.  Also considering Parguard IUD or Mirena IUD  Encourage Kegels and abdominal exercise.  Continue a multivitamin/prenatal supplement, especially if breastfeeding.  Postpartum Hgb was not done today.  PPSM handout given today  Plans to discuss possible medication change with therapist and primary care provider.  Pcp has been managing medications in the past.  Requested prescription for POP today, will plan to start at 4 weeks PP.  Also discussed LARC options: Paragard, Mirena.  Is also considering an IUD as she does not want to get pregnant.   Encouraged to notify clinic/schedule appt if  IUD desired, or may be able to place at 6w PP visit if she is sure she would like to try  Plan to offer hgb at 6w PP visit    Return at 6 weeks postpartum for physical and pelvic exam.    Radha DHILLON CNM, [facility]  [phone]        avs breast feeding  avs contraception of choice

## 2023-03-07 NOTE — DISCHARGE SUMMARY
OBSTETRIC DISCHARGE SUMMARY    Admission Date: 2023   Discharge Date: 2023      Admission Diagnosis: Gestational hypertension [O13.9]  Elevated LFTs [R79.89]  Anemia due to blood loss, acute [D62]  Postpartum hypertension [O16.5]   S/p 1LTCS  Discharge Diagnosis:     Procedures: none    Consults: none    Hospital Course: Beata Joseph  is a  POD#3 from 1LTCS who was admitted for severe range BPs and new elevated LFTS with known history of severe gestational hypertension. She had been admitted previously for induction of labor at 35+6wk due to severe GHTN and underwent an uncomplicated 1LTCS at 36wk for fetal intolerance to labor remote from delivery. She was on IV magnesium sulfate but this was stopped at delivery due to hypotension. Postpartum her blood pressures remained in the normal to mild range. She requested discharge to home on POD#2 due to inclement weather.  She returned to the ED reporting BPs above the threshold at time of discharge. In ED her BPs were 160/112. She did require IV labetalol 10mg as well as oral 100mg labetalol. She had some chest tightness in the ED which did resolve. No other symptoms of preeclampsia.  She was admitted and started on 24hrs of magnesium sulfate and 200mg oral labetalol BID. On HD#3 her labetalol increased to 200mg TID and her LFTs stabilized/slightly decreased. By HD#4 BPs were controlled on oral meds and she was ready for discharge.     ALT/AST:    16/-> 38/50-> 51/59-> 57/54-> 56/37  Cr:  0.54, remained wnl  H/H:   7.7 ->  8.6 -> 9.5/29.8->  8.9/28.2-> 9.5/30.3  Plt:  251, remained wnl    Discharge precautions were reviewed. blood pressure monitoring discussed. Appt for blood pressure check in clinic with RNs arranged the following day. If she has any concerns, she has been directed to call the clinic.     Bing Arechigas, DO

## 2023-03-10 ENCOUNTER — OFFICE VISIT (OUTPATIENT)
Dept: MIDWIFE SERVICES | Facility: CLINIC | Age: 33
End: 2023-03-10
Payer: COMMERCIAL

## 2023-03-10 VITALS — DIASTOLIC BLOOD PRESSURE: 90 MMHG | WEIGHT: 178 LBS | SYSTOLIC BLOOD PRESSURE: 138 MMHG | BODY MASS INDEX: 26.29 KG/M2

## 2023-03-10 DIAGNOSIS — F41.8 POSTPARTUM ANXIETY: Primary | ICD-10-CM

## 2023-03-10 DIAGNOSIS — Z30.011 OCP (ORAL CONTRACEPTIVE PILLS) INITIATION: ICD-10-CM

## 2023-03-10 PROCEDURE — 99024 POSTOP FOLLOW-UP VISIT: CPT | Performed by: NURSE PRACTITIONER

## 2023-03-10 RX ORDER — ACETAMINOPHEN AND CODEINE PHOSPHATE 120; 12 MG/5ML; MG/5ML
0.35 SOLUTION ORAL DAILY
Qty: 84 TABLET | Refills: 0 | Status: SHIPPED | OUTPATIENT
Start: 2023-03-24 | End: 2023-05-11

## 2023-03-10 ASSESSMENT — ANXIETY QUESTIONNAIRES
3. WORRYING TOO MUCH ABOUT DIFFERENT THINGS: MORE THAN HALF THE DAYS
GAD7 TOTAL SCORE: 12
1. FEELING NERVOUS, ANXIOUS, OR ON EDGE: MORE THAN HALF THE DAYS
7. FEELING AFRAID AS IF SOMETHING AWFUL MIGHT HAPPEN: MORE THAN HALF THE DAYS
IF YOU CHECKED OFF ANY PROBLEMS ON THIS QUESTIONNAIRE, HOW DIFFICULT HAVE THESE PROBLEMS MADE IT FOR YOU TO DO YOUR WORK, TAKE CARE OF THINGS AT HOME, OR GET ALONG WITH OTHER PEOPLE: VERY DIFFICULT
5. BEING SO RESTLESS THAT IT IS HARD TO SIT STILL: NOT AT ALL
6. BECOMING EASILY ANNOYED OR IRRITABLE: MORE THAN HALF THE DAYS
GAD7 TOTAL SCORE: 12
2. NOT BEING ABLE TO STOP OR CONTROL WORRYING: MORE THAN HALF THE DAYS

## 2023-03-10 ASSESSMENT — PATIENT HEALTH QUESTIONNAIRE - PHQ9
5. POOR APPETITE OR OVEREATING: MORE THAN HALF THE DAYS
SUM OF ALL RESPONSES TO PHQ QUESTIONS 1-9: 6

## 2023-03-10 NOTE — PATIENT INSTRUCTIONS
COMMON BREASTFEEDING PROBLEMS    Women can have several different problems when they breastfeed.  Women often quit breastfeeding when these problems occur, but most problems can be treated and women can continue to breastfeed their babies.    Engorgement:    This is when the breasts are too full of milk.  Breasts can be swollen, hard, warm and painful. This can also make latch more difficult for your baby.  The only proven way to reduce this is to hand express or pump to let some milk out between feedings. Letting out too much milk will exacerbate the problem by producing even more milk.    You can also try:  Cold packs  Taking pain relieving medicine such as acetaminophen (Tylenol) or ibuprofen (Advil, Motrin)  Take a warm shower  Massage your breasts to start milk flow.   Breastfeed your baby on demand, make sure baby is emptying breast completely  Prevent engorgement by limiting pumping if you are breastfeeding on demand    Sore/Painful Nipples:    Some nipple soreness is normal during the first minute of breast feeding. Pain that is lasting longer is not normal after breast feeding is established.  Pain can be caused by nipple cracks and blisters.  This is usually due to an incorrect latch.    The best way to reduce nipple soreness is to make sure your baby is latching correctly on your breast.    You can also try:  Lanolin ointment  APNO (All purpose nipple ointment) which you need a prescription for  Apply a cold or warm cloth to your nipples  Wear breast pads to protect your nipples  Let your nipples air dry after feedings  Cleanse nipple with unscented soap      Blocked Milk Ducts:    A blocked milk duct can cause a red, painful lump in your breast.  It can also cause a white plug at the end of your nipple. If you have a blocked milk duct, breastfeed more often.  Make sure your baby empties your breast after each feeding.  Start your feedings with the breast with the plugged duct and try various positions to  empty the breast as much as you can. Massage your plugged duct in a warm shower to try to unplug it. If these methods do not work you may be at risk for a breast infection.     Breast Infection:    A breast infection is called Mastitis.  In addition to having a hard, red, swollen area of your breast you will most likely will also have fever and chills and not feel well.  Engorgement and incomplete breast emptying can contribute to the problem and make your symptoms worse.    DO NOT STOP breastfeeding when you have mastitis!    Make an appointment with your midwife to be seen as soon as possible.  You will be started immediately on an antibiotic.    You may also:  Use pain medication such as  such as acetaminophen (Tylenol) or ibuprofen (Advil, Motrin)  Massage your breasts during feedings  Use warm compress to encourage milk let down prior to feeding   Use a breast pump to empty your breasts more completely after feedings  Rest for the next day or so and increase your fluids   -Take Chamomile tea, Hops infusion or lemon balm infusion   Watch for increased pain, increasing size of the breast, increasing firmness or a developing mass to suggest an abscess formation.  You should improve quickly on antibiotics.  If you are not improving with in the next 1-2 days or your fever increases > 101 F call the clinic.      For reliable information on breast feeding visit:    [email or web address]  [email or web address] ([facility] and [facility] [place])      If you are struggling with breastfeeding and need extra support you may also consider seeking the advice of a lactation consultation.    Please call with further concerns:    [facility]  [phone]    PREECLAMPSIA SIGNS AND SYMPTOMS    Preeclampsia is a dangerous condition that some women develop in the second half of pregnancy. It can also begin after the baby is born.  Preeclampsia causes high blood pressure and can cause  "problems with many organ systems in your body.  It can also affect the growth of your baby. The exact cause of preeclampsia is unknown, however, there are signs and symptoms to watch for:    -A bad headache that doesn't improve with Tylenol  -Visual changes such as spots, flashes of light, blurry vision  -Pain in the upper right part of your abdomen, especially under the ribs that doesn't go away  -Nausea and/or vomiting  -Feeling extremely tired  -Yellowing of the skin and/or eyes  -Feeling \"not quite right\" or that something is wrong  -An extreme amount of swelling (some swelling in pregnancy is very normal)    If your midwife feels that you are developing preeclampsia, you will have lab tests drawn and will be monitored very closely.     If you are experiencing anyof these symptoms, call the [facility] immediately at [phone].                             "

## 2023-03-16 ENCOUNTER — VIRTUAL VISIT (OUTPATIENT)
Dept: FAMILY MEDICINE | Facility: CLINIC | Age: 33
End: 2023-03-16
Payer: COMMERCIAL

## 2023-03-16 DIAGNOSIS — F33.0 MILD EPISODE OF RECURRENT MAJOR DEPRESSIVE DISORDER (H): ICD-10-CM

## 2023-03-16 DIAGNOSIS — F41.8 POSTPARTUM ANXIETY: Primary | ICD-10-CM

## 2023-03-16 PROCEDURE — 96127 BRIEF EMOTIONAL/BEHAV ASSMT: CPT | Mod: VID | Performed by: FAMILY MEDICINE

## 2023-03-16 PROCEDURE — 99213 OFFICE O/P EST LOW 20 MIN: CPT | Mod: VID | Performed by: FAMILY MEDICINE

## 2023-03-16 RX ORDER — ESCITALOPRAM OXALATE 10 MG/1
10 TABLET ORAL DAILY
Qty: 30 TABLET | Refills: 3 | Status: SHIPPED | OUTPATIENT
Start: 2023-03-16 | End: 2023-04-14

## 2023-03-16 NOTE — PROGRESS NOTES
"Beata is a 32 year old who is being evaluated via a billable video visit.      How would you like to obtain your AVS? MyChart  If the video visit is dropped, the invitation should be resent by: Text to cell phone: [phone]  Will anyone else be joining your video visit? No          Assessment & Plan     Postpartum anxiety  Plan: add selective serotonin reuptake inhibitor /lexapro and continue wellbutrin.  Follow up on phone or video in 4 weeks  - escitalopram (LEXAPRO) 10 MG tablet; Take 1 tablet (10 mg) by mouth daily  - EMOTIONAL / BEHAVIORAL ASSESSMENT  ANNEMARIE-7 SCORE 11/14/2022 12/8/2022 3/10/2023   Total Score 13 (moderate anxiety) 8 (mild anxiety) -   Total Score 13 8 12       Mild episode of recurrent major depressive disorder (H)  Plan: continue wellbutrin 150 mg once daily  Does not need refill till NOv 2023  - escitalopram (LEXAPRO) 10 MG tablet; Take 1 tablet (10 mg) by mouth daily  - EMOTIONAL / BEHAVIORAL ASSESSMENT  PHQ 12/8/2022 1/13/2023 3/10/2023   PHQ-9 Total Score 8 10 6   Q9: Thoughts of better off dead/self-harm past 2 weeks Not at all Not at all Not at all   F/U: Thoughts of suicide or self-harm - - -   F/U: Self harm-plan - - -   F/U: Self-harm action - - -   F/U: Safety concerns - - -     Prescription drug management         BMI:   Estimated body mass index is 26.29 kg/m  as calculated from the following:    Height as of 2/23/23: 1.753 m (5' 9\").    Weight as of 3/10/23: 80.7 kg (178 lb).           No follow-ups on file.   Follow-up Visit   Expected date:  Mar 30, 2023 (Approximate)      Follow Up Appointment Details:     Follow-up with whom?: Me    Follow-Up for what?: Chronic Disease f/u    Chronic Disease f/u:  Depression  Anxiety       How?: Video                    Serina Munguia MD  [facility]    Yan Cota is a 32 year old, presenting for the following health issues:  Recheck Medication (Change to new anxiety medication)      HPI     Depression and " Anxiety Follow-Up    How are you doing with your depression since your last visit? Improved but anxiety worse    How are you doing with your anxiety since your last visit?  Worsened anxiety    Are you having other symptoms that might be associated with depression or anxiety? No    Have you had a significant life event? OTHER: had a baby     Do you have any concerns with your use of alcohol or other drugs? No    Social History     Tobacco Use     Smoking status: Never     Smokeless tobacco: Never   Vaping Use     Vaping Use: Never used   Substance Use Topics     Alcohol use: Not Currently     Drug use: No     PHQ 2022 2023 3/10/2023   PHQ-9 Total Score 8 10 6   Q9: Thoughts of better off dead/self-harm past 2 weeks Not at all Not at all Not at all   F/U: Thoughts of suicide or self-harm - - -   F/U: Self harm-plan - - -   F/U: Self-harm action - - -   F/U: Safety concerns - - -     ANNEMARIE-7 SCORE 2022 2022 3/10/2023   Total Score 13 (moderate anxiety) 8 (mild anxiety) -   Total Score 13 8 12         Suicide Assessment Five-step Evaluation and Treatment (SAFE-T)    How many servings of fruits and vegetables do you eat daily?  2-3    On average, how many sweetened beverages do you drink each day (Examples: soda, juice, sweet tea, etc.  Do NOT count diet or artificially sweetened beverages)?   1    How many days per week do you exercise enough to make your heart beat faster? 3 or less    How many minutes a day do you exercise enough to make your heart beat faster? 9 or less    How many days per week do you miss taking your medication? 0    Urgent  -premature, Epidural,magnesim for pre-eclampisa.  Baby doing well and was 4 weeks early  Since then- being home  Post partum anxiety is worse  Working with postpartum therapist   wellbutrin always helped most with depression   Has taken lexapro & no side effects  Would like to resume    Blood pressure on own  Most recent 121/88    Review of  Systems   Constitutional, HEENT, cardiovascular, pulmonary, GI, , musculoskeletal, neuro, skin, endocrine and psych systems are negative, except as otherwise noted.      Objective    Vitals - Patient Reported  Systolic (Patient Reported): 121  Diastolic (Patient Reported): 88  Weight (Patient Reported):  (n/a)  Height (Patient Reported):  (n/a)  SpO2 (Patient Reported):  (n/a)  Temperature (Patient Reported):  (n/a)  Pulse (Patient Reported):  (n/a)      Vitals:  No vitals were obtained today due to virtual visit.    Physical Exam   GENERAL: Healthy, alert and no distress  EYES: Eyes grossly normal to inspection.  No discharge or erythema, or obvious scleral/conjunctival abnormalities.  RESP: No audible wheeze, cough, or visible cyanosis.  No visible retractions or increased work of breathing.    SKIN: Visible skin clear. No significant rash, abnormal pigmentation or lesions.  NEURO: Cranial nerves grossly intact.  Mentation and speech appropriate for age.  PSYCH: Mentation appears normal, affect normal/bright, judgement and insight intact, normal speech and appearance well-groomed.                Video-Visit Details    Type of service:  Video Visit     Originating Location (pt. Location): Home    Distant Location (provider location):  On-site  Platform used for Video Visit: [employer]

## 2023-03-17 ENCOUNTER — ALLIED HEALTH/NURSE VISIT (OUTPATIENT)
Dept: NURSING | Facility: CLINIC | Age: 33
End: 2023-03-17
Payer: COMMERCIAL

## 2023-03-17 ENCOUNTER — TELEPHONE (OUTPATIENT)
Dept: MIDWIFE SERVICES | Facility: CLINIC | Age: 33
End: 2023-03-17
Payer: COMMERCIAL

## 2023-03-17 VITALS — DIASTOLIC BLOOD PRESSURE: 99 MMHG | SYSTOLIC BLOOD PRESSURE: 149 MMHG | HEART RATE: 92 BPM

## 2023-03-17 DIAGNOSIS — R03.0 ELEVATED BLOOD PRESSURE READING: Primary | ICD-10-CM

## 2023-03-17 DIAGNOSIS — R03.0 ELEVATED BLOOD PRESSURE READING: ICD-10-CM

## 2023-03-17 LAB
ALBUMIN MFR UR ELPH: <4 MG/DL (ref 1–14)
ALBUMIN SERPL BCG-MCNC: 4.8 G/DL (ref 3.5–5.2)
ALP SERPL-CCNC: 73 U/L (ref 35–104)
ALT SERPL W P-5'-P-CCNC: 39 U/L (ref 10–35)
ANION GAP SERPL CALCULATED.3IONS-SCNC: 10 MMOL/L (ref 7–15)
AST SERPL W P-5'-P-CCNC: 28 U/L (ref 10–35)
BASOPHILS # BLD AUTO: 0 10E3/UL (ref 0–0.2)
BASOPHILS NFR BLD AUTO: 1 %
BILIRUB SERPL-MCNC: 0.3 MG/DL
BUN SERPL-MCNC: 10.4 MG/DL (ref 6–20)
CALCIUM SERPL-MCNC: 10.1 MG/DL (ref 8.6–10)
CHLORIDE SERPL-SCNC: 103 MMOL/L (ref 98–107)
CREAT SERPL-MCNC: 0.66 MG/DL (ref 0.51–0.95)
CREAT UR-MCNC: 18.3 MG/DL
DEPRECATED HCO3 PLAS-SCNC: 26 MMOL/L (ref 22–29)
EOSINOPHIL # BLD AUTO: 0.1 10E3/UL (ref 0–0.7)
EOSINOPHIL NFR BLD AUTO: 3 %
ERYTHROCYTE [DISTWIDTH] IN BLOOD BY AUTOMATED COUNT: 16.3 % (ref 10–15)
GFR SERPL CREATININE-BSD FRML MDRD: >90 ML/MIN/1.73M2
GLUCOSE SERPL-MCNC: 101 MG/DL (ref 70–99)
HCT VFR BLD AUTO: 41.9 % (ref 35–47)
HGB BLD-MCNC: 13.4 G/DL (ref 11.7–15.7)
LYMPHOCYTES # BLD AUTO: 1.9 10E3/UL (ref 0.8–5.3)
LYMPHOCYTES NFR BLD AUTO: 42 %
MCH RBC QN AUTO: 27.5 PG (ref 26.5–33)
MCHC RBC AUTO-ENTMCNC: 32 G/DL (ref 31.5–36.5)
MCV RBC AUTO: 86 FL (ref 78–100)
MONOCYTES # BLD AUTO: 0.3 10E3/UL (ref 0–1.3)
MONOCYTES NFR BLD AUTO: 7 %
NEUTROPHILS # BLD AUTO: 2.1 10E3/UL (ref 1.6–8.3)
NEUTROPHILS NFR BLD AUTO: 47 %
PLATELET # BLD AUTO: 200 10E3/UL (ref 150–450)
POTASSIUM SERPL-SCNC: 4.4 MMOL/L (ref 3.4–5.3)
PROT SERPL-MCNC: 7.2 G/DL (ref 6.4–8.3)
PROT/CREAT 24H UR: NORMAL MG/G{CREAT}
RBC # BLD AUTO: 4.88 10E6/UL (ref 3.8–5.2)
SODIUM SERPL-SCNC: 139 MMOL/L (ref 136–145)
WBC # BLD AUTO: 4.4 10E3/UL (ref 4–11)

## 2023-03-17 PROCEDURE — 85025 COMPLETE CBC W/AUTO DIFF WBC: CPT

## 2023-03-17 PROCEDURE — 84156 ASSAY OF PROTEIN URINE: CPT

## 2023-03-17 PROCEDURE — 80053 COMPREHEN METABOLIC PANEL: CPT

## 2023-03-17 PROCEDURE — 99211 OFF/OP EST MAY X REQ PHY/QHP: CPT | Mod: 24

## 2023-03-17 PROCEDURE — 36415 COLL VENOUS BLD VENIPUNCTURE: CPT

## 2023-03-17 RX ORDER — NIFEDIPINE 30 MG/1
30 TABLET, EXTENDED RELEASE ORAL DAILY
Qty: 30 TABLET | Refills: 0 | Status: SHIPPED | OUTPATIENT
Start: 2023-03-17 | End: 2023-03-21

## 2023-03-17 NOTE — TELEPHONE ENCOUNTER
2/20/23:  Delivered    Patient calling with elevated BPs; not currently on any BP medications.     3/17: 136/102, 133/96, 145/95  3/16: 127/91, 125/99, 126/90    Mild headache noted today, patient has noted she took 600mg Ibuprofen  And headache is still present.    Routing to provider to advise  Katie Heredia RN on 3/17/2023 at 12:36 PM

## 2023-03-17 NOTE — TELEPHONE ENCOUNTER
Call placed to discuss recent at home BP readings.   SBAR discussed with Dr. Kurtz. At this time MD does not feel restarting medications is needed. Recommends Beata coming to clinic to have her at-home BP cuff calibrated with our cuff in clinic.   Beata states during this call her BP seems to vary. When standing her most recent BP was 135/111, but then sitting it was 132/79, but she can feel when it is high.   To note, Beata was admitted to L&D on 2/19 for severe gestational HTN with IV Magnesium therapy. She had a PCS on 2/20 due to fetal intolerance to labor. She did not receive 24 hours of IV Mag after delivery due to her having low blood pressures. She was discharged home on 2/22 without medication and mildly elevated BPs. Incidentally, Beata was readmitted through the ED on 2/23 with elevated BPs and received IV and oral Labetalol as well as 24 hr Magnesium Sulfate therapy. Beata was discharged home on 2/26 after normal pre-e labs and prescribed with oral Labetalol but discontinued this medication due to a drop in her HR. At her PPV on 3/10 she reported her BPs were stable and she was no longer on oral medication. She has been checking her BP at home a few times a day and has had some mildly elevated ones. See nurses note below.  Beata is in agreement with plan to come to the clinic for a BP check and comparison with her home cuff. If high when here in the clinic, pre-e labs will be considered again. Discussed with Beata that now that she is almost a month PP her elevated BPs may be chronic htn vs gestational htn.     Ruma DHILLON CNM

## 2023-03-17 NOTE — PROGRESS NOTES
Beata is here for BP check    2/20/23 C/S   Complicated by GHTN    Home cuff reading after resting for 5-10 mins  151/109    Clinic reading after another 5 mins of resting  149/99  P92    Feeling well, no complaints.  Aware of s/s that would require evaluation    Is not on any BP meds.    Consulted with Ruma Pearson CNM-labs to be done today, will contact pt with results.    Mary Kay Shaikh RN on 3/17/2023 at 2:57 PM

## 2023-03-20 RX ORDER — LABETALOL 100 MG/1
100 TABLET, FILM COATED ORAL 2 TIMES DAILY
OUTPATIENT
Start: 2023-03-20

## 2023-03-20 NOTE — TELEPHONE ENCOUNTER
"Requested Prescriptions   Pending Prescriptions Disp Refills     labetalol (NORMODYNE) 100 MG tablet       Sig: Take 1 tablet (100 mg) by mouth 2 times daily       There is no refill protocol information for this order        Last Written Prescription Date:  2/23/23  Last Fill Quantity: 60,  # refills: 0   Last office visit: 7/9/2021 with prescribing provider:  Jhon   3/6/23:all BPs are WNL so do not continue taking labetalol. Take BP 2x/day or if symptomatic for preeclampsia. If BPs persistently 150s/100s then she should notify us.    3/20/23:  Radha Parisi CNM response, \"Dr. Ferreira does recommend starting on a Calcium channel blocker at this time.  I have a sent a prescription for Nifedipine XL 30 mg to the [facility] in [facility] in [place].   You will take it 1 time daily.  You should continue to monitor your Blood pressures as you have been and notify us for any high/low BPs, dizziness, or any other symptoms or concerns.  We will check your BP again postpartum visit.  If your SBP or DBP remains elevated at that time, you will need to then follow up with a primary care provider or internal medicine provider.      Future Office Visit:   Next 5 appointments (look out 90 days)    Apr 06, 2023  9:40 AM  Post Partum with ALEJO Chino CNM  [facility] ([facility] ) [address] [place] [id number]  [phone]         RX denied. Lbetalol discontinued on 3/6/23    Merary Villar RN on 3/20/2023 at 12:00 PM      "

## 2023-03-21 ENCOUNTER — HOSPITAL ENCOUNTER (EMERGENCY)
Facility: CLINIC | Age: 33
Discharge: HOME | End: 2023-03-21
Attending: EMERGENCY MEDICINE | Admitting: EMERGENCY MEDICINE
Payer: COMMERCIAL

## 2023-03-21 ENCOUNTER — TELEPHONE (OUTPATIENT)
Dept: MIDWIFE SERVICES | Facility: CLINIC | Age: 33
End: 2023-03-21
Payer: COMMERCIAL

## 2023-03-21 VITALS
HEIGHT: 69 IN | HEART RATE: 71 BPM | BODY MASS INDEX: 25.92 KG/M2 | DIASTOLIC BLOOD PRESSURE: 94 MMHG | OXYGEN SATURATION: 96 % | RESPIRATION RATE: 17 BRPM | TEMPERATURE: 97.2 F | SYSTOLIC BLOOD PRESSURE: 142 MMHG | WEIGHT: 175 LBS

## 2023-03-21 DIAGNOSIS — R03.0 ELEVATED BLOOD PRESSURE READING: ICD-10-CM

## 2023-03-21 DIAGNOSIS — R00.2 PALPITATIONS: ICD-10-CM

## 2023-03-21 DIAGNOSIS — R79.89 ELEVATED LFTS: ICD-10-CM

## 2023-03-21 LAB
ALBUMIN SERPL BCG-MCNC: 4.7 G/DL (ref 3.5–5.2)
ALBUMIN UR-MCNC: NEGATIVE MG/DL
ALP SERPL-CCNC: 73 U/L (ref 35–104)
ALT SERPL W P-5'-P-CCNC: 58 U/L (ref 10–35)
ANION GAP SERPL CALCULATED.3IONS-SCNC: 11 MMOL/L (ref 7–15)
APPEARANCE UR: CLEAR
AST SERPL W P-5'-P-CCNC: 39 U/L (ref 10–35)
ATRIAL RATE - MUSE: 114 BPM
BASOPHILS # BLD AUTO: 0.1 10E3/UL (ref 0–0.2)
BASOPHILS NFR BLD AUTO: 1 %
BILIRUB SERPL-MCNC: 0.2 MG/DL
BILIRUB UR QL STRIP: NEGATIVE
BUN SERPL-MCNC: 10 MG/DL (ref 6–20)
CALCIUM SERPL-MCNC: 9.6 MG/DL (ref 8.6–10)
CHLORIDE SERPL-SCNC: 104 MMOL/L (ref 98–107)
COLOR UR AUTO: ABNORMAL
CREAT SERPL-MCNC: 0.67 MG/DL (ref 0.51–0.95)
D DIMER PPP FEU-MCNC: <0.27 UG/ML FEU (ref 0–0.5)
DEPRECATED HCO3 PLAS-SCNC: 25 MMOL/L (ref 22–29)
DIASTOLIC BLOOD PRESSURE - MUSE: NORMAL MMHG
EOSINOPHIL # BLD AUTO: 0.2 10E3/UL (ref 0–0.7)
EOSINOPHIL NFR BLD AUTO: 3 %
ERYTHROCYTE [DISTWIDTH] IN BLOOD BY AUTOMATED COUNT: 16.3 % (ref 10–15)
GFR SERPL CREATININE-BSD FRML MDRD: >90 ML/MIN/1.73M2
GLUCOSE SERPL-MCNC: 101 MG/DL (ref 70–99)
GLUCOSE UR STRIP-MCNC: NEGATIVE MG/DL
HCT VFR BLD AUTO: 41.4 % (ref 35–47)
HGB BLD-MCNC: 13.4 G/DL (ref 11.7–15.7)
HGB UR QL STRIP: NEGATIVE
IMM GRANULOCYTES # BLD: 0 10E3/UL
IMM GRANULOCYTES NFR BLD: 0 %
INTERPRETATION ECG - MUSE: NORMAL
KETONES UR STRIP-MCNC: NEGATIVE MG/DL
LEUKOCYTE ESTERASE UR QL STRIP: NEGATIVE
LYMPHOCYTES # BLD AUTO: 2.1 10E3/UL (ref 0.8–5.3)
LYMPHOCYTES NFR BLD AUTO: 35 %
MCH RBC QN AUTO: 27.3 PG (ref 26.5–33)
MCHC RBC AUTO-ENTMCNC: 32.4 G/DL (ref 31.5–36.5)
MCV RBC AUTO: 84 FL (ref 78–100)
MONOCYTES # BLD AUTO: 0.4 10E3/UL (ref 0–1.3)
MONOCYTES NFR BLD AUTO: 7 %
MUCOUS THREADS #/AREA URNS LPF: PRESENT /LPF
NEUTROPHILS # BLD AUTO: 3.2 10E3/UL (ref 1.6–8.3)
NEUTROPHILS NFR BLD AUTO: 54 %
NITRATE UR QL: NEGATIVE
NRBC # BLD AUTO: 0 10E3/UL
NRBC BLD AUTO-RTO: 0 /100
NT-PROBNP SERPL-MCNC: 76 PG/ML (ref 0–450)
P AXIS - MUSE: 64 DEGREES
PH UR STRIP: 6.5 [PH] (ref 5–7)
PLATELET # BLD AUTO: 180 10E3/UL (ref 150–450)
POTASSIUM SERPL-SCNC: 3.6 MMOL/L (ref 3.4–5.3)
PR INTERVAL - MUSE: 144 MS
PROT SERPL-MCNC: 6.9 G/DL (ref 6.4–8.3)
QRS DURATION - MUSE: 90 MS
QT - MUSE: 390 MS
QTC - MUSE: 537 MS
R AXIS - MUSE: 56 DEGREES
RBC # BLD AUTO: 4.91 10E6/UL (ref 3.8–5.2)
RBC URINE: <1 /HPF
SODIUM SERPL-SCNC: 140 MMOL/L (ref 136–145)
SP GR UR STRIP: 1.02 (ref 1–1.03)
SQUAMOUS EPITHELIAL: <1 /HPF
SYSTOLIC BLOOD PRESSURE - MUSE: NORMAL MMHG
T AXIS - MUSE: 28 DEGREES
TROPONIN T SERPL HS-MCNC: <6 NG/L
TSH SERPL DL<=0.005 MIU/L-ACNC: 2.06 UIU/ML (ref 0.3–4.2)
UROBILINOGEN UR STRIP-MCNC: NORMAL MG/DL
VENTRICULAR RATE- MUSE: 114 BPM
WBC # BLD AUTO: 5.9 10E3/UL (ref 4–11)
WBC URINE: <1 /HPF

## 2023-03-21 PROCEDURE — 84443 ASSAY THYROID STIM HORMONE: CPT | Performed by: EMERGENCY MEDICINE

## 2023-03-21 PROCEDURE — 85379 FIBRIN DEGRADATION QUANT: CPT | Performed by: EMERGENCY MEDICINE

## 2023-03-21 PROCEDURE — 250N000013 HC RX MED GY IP 250 OP 250 PS 637: Performed by: EMERGENCY MEDICINE

## 2023-03-21 PROCEDURE — 36415 COLL VENOUS BLD VENIPUNCTURE: CPT | Performed by: EMERGENCY MEDICINE

## 2023-03-21 PROCEDURE — 80053 COMPREHEN METABOLIC PANEL: CPT | Performed by: EMERGENCY MEDICINE

## 2023-03-21 PROCEDURE — 99285 EMERGENCY DEPT VISIT HI MDM: CPT

## 2023-03-21 PROCEDURE — 83880 ASSAY OF NATRIURETIC PEPTIDE: CPT | Performed by: EMERGENCY MEDICINE

## 2023-03-21 PROCEDURE — 84484 ASSAY OF TROPONIN QUANT: CPT | Performed by: EMERGENCY MEDICINE

## 2023-03-21 PROCEDURE — 93005 ELECTROCARDIOGRAM TRACING: CPT

## 2023-03-21 PROCEDURE — 81001 URINALYSIS AUTO W/SCOPE: CPT | Performed by: EMERGENCY MEDICINE

## 2023-03-21 PROCEDURE — 85004 AUTOMATED DIFF WBC COUNT: CPT | Performed by: EMERGENCY MEDICINE

## 2023-03-21 RX ORDER — NIFEDIPINE 30 MG/1
30 TABLET, EXTENDED RELEASE ORAL ONCE
Status: COMPLETED | OUTPATIENT
Start: 2023-03-21 | End: 2023-03-21

## 2023-03-21 RX ORDER — NIFEDIPINE 30 MG/1
60 TABLET, EXTENDED RELEASE ORAL DAILY
Qty: 30 TABLET | Refills: 0 | Status: SHIPPED | OUTPATIENT
Start: 2023-03-21 | End: 2023-04-04

## 2023-03-21 RX ADMIN — NIFEDIPINE 30 MG: 30 TABLET, FILM COATED, EXTENDED RELEASE ORAL at 23:21

## 2023-03-21 ASSESSMENT — ACTIVITIES OF DAILY LIVING (ADL)
ADLS_ACUITY_SCORE: 35
ADLS_ACUITY_SCORE: 35

## 2023-03-21 NOTE — TELEPHONE ENCOUNTER
Started Nifedipine 30 mg daily on Saturday  Home BP today 155/104 P127  Recheck 142/102 P91    Having some feelings of heart racing and palpitations.  No other symptoms of concern.   Last clinic BP check 3/17 149/99    Mary Kay Shaikh RN on 3/21/2023 at 10:29 AM

## 2023-03-21 NOTE — TELEPHONE ENCOUNTER
Spoke with Beata regarding increased home BP reading this morning of 155/104 and 142/102 with pulses of 127 and 910 respectively. She denies any symptoms of warning signs or severe features.     She was seen 3/17/2023 for increased diastolic blood pressure readings and was started on Nifedipine ER 30 mg daily. Preeclampsia labs at that time were normal aside from ALT, which was 39, improved from previous level (56 on 2/25/23). She started Nifedipine on Saturday, taking medication every morning. Noticing more heart palpitations since starting medication, reviewed that this can be a side effect of nifedipine, but may be acceptable if not impacting ability to function/exercise and no warning symptoms of lightheadedness/feeling faint.     This patient was discussed with Ob-Gyn on-call Dr. Arevalo; plan made to increase Nifedipine ER to 60 mg daily, may take 60 mg q morning or 30 mg BID. Recommendation reviewed with patient who is agreeable to plan. Prescription sent for 1 month worth of medication.    Also recommended patient begin following with primary care for blood pressure management. Pt accepting and expressing plan to call PCP.    Warning signs reviewed. Pt to seek care immediately if unremitting headache, vision change, RUQ pain, heart racing not improved with rest, feeling faint/syncope.     All questions answered.     ALEJO Benitez [facility]  [phone]

## 2023-03-22 NOTE — ED TRIAGE NOTES
Patient here with palpitation which stated yesterday. She denies having chest pain but has shortness of breath. She started on nifedipine on Saturday for post partum hypertension. She took 60 mg nifedipine today which is not helping     Triage Assessment     Row Name 03/21/23 2007       Triage Assessment (Adult)    Airway WDL WDL       Respiratory WDL    Respiratory WDL WDL       Skin Circulation/Temperature WDL    Skin Circulation/Temperature WDL WDL       Cardiac WDL    Cardiac WDL WDL       Peripheral/Neurovascular WDL    Peripheral Neurovascular WDL WDL       Cognitive/Neuro/Behavioral WDL    Cognitive/Neuro/Behavioral WDL WDL

## 2023-03-22 NOTE — DISCHARGE INSTRUCTIONS
Change the nifedipine to 30 mg twice a day starting tomorrow.  Check your blood pressure a couple times a day while you are relaxed and sitting down.  Write it down in a journal.  Contact your OB clinic on Friday with an update to go over your readings.  If you develop significant headaches and blurry vision with worsening blood pressures, return to the emergency room.

## 2023-03-22 NOTE — ED PROVIDER NOTES
History     Chief Complaint:  Palpitations       HPI   Beata Joseph is a 32 year old female 1 month postpartum with a history of pregnancy-induced hypertension and mild help syndrome after delivery who presents with palpitations that started today.  She was started on nifedipine last Saturday because of her blood pressure being elevated still by her OB doctor.  She took 30 mg  and Monday.  Her blood pressure was higher today and she was feeling her heart going faster and palpating and she took 60 today.  She had been on labetalol when she was in the hospital for postpartum mild help syndrome and preeclampsia but she got hypotensive on it so they switched her recently to nifedipine.  She had mild blurry vision while she was waiting in the lobby when she was trying to read some text messages but that was only for a short period of time and now it is normal.  She does not have a headache or edema that has developed in her legs.  She delivered at 36 weeks because of the pregnancy-induced hypertension and had a .  This is her first pregnancy.  She has not been sick recently no fevers or chills or urinary symptoms.        Admission Date: 2023   Discharge Date: 2023     Admission Diagnosis: Gestational hypertension [O13.9]  Elevated LFTs [R79.89]  Anemia due to blood loss, acute [D62]  Postpartum hypertension [O16.5]   S/p 1LTCS  Discharge Diagnosis:      Procedures: none     Consults: none     Hospital Course: Beata Joseph  is a  POD#3 from 1LTCS who was admitted for severe range BPs and new elevated LFTS with known history of severe gestational hypertension. She had been admitted previously for induction of labor at 35+6wk due to severe GHTN and underwent an uncomplicated 1LTCS at 36wk for fetal intolerance to labor remote from delivery. She was on IV magnesium sulfate but this was stopped at delivery due to hypotension. Postpartum her blood pressures remained in the normal to  mild range. She requested discharge to home on POD#2 due to inclement weather.  She returned to the ED reporting BPs above the threshold at time of discharge. In ED her BPs were 160/112. She did require IV labetalol 10mg as well as oral 100mg labetalol. She had some chest tightness in the ED which did resolve. No other symptoms of preeclampsia.  She was admitted and started on 24hrs of magnesium sulfate and 200mg oral labetalol BID. On HD#3 her labetalol increased to 200mg TID and her LFTs stabilized/slightly decreased. By HD#4 BPs were controlled on oral meds and she was ready for discharge.       Independent Historian:   None - Patient Only    Review of External Notes: I reviewed her notes from her hospitalization on -.    ROS:  Review of Systems    Allergies:  Cephalosporins  Cats  No Clinical Screening - See Comments  Pollen Extract     Medications:    buPROPion (WELLBUTRIN XL) 150 MG 24 hr tablet  escitalopram (LEXAPRO) 10 MG tablet  ibuprofen (ADVIL/MOTRIN) 800 MG tablet  NIFEdipine ER OSMOTIC (PROCARDIA XL) 30 MG 24 hr tablet  [START ON 3/24/2023] norethindrone (MICRONOR) 0.35 MG tablet  omeprazole (PRILOSEC) 20 MG DR capsule  Prenat w/o D-MO-Ayyaors-FA-DHA (PNV-DHA PO)        Past Medical History:    Past Medical History:   Diagnosis Date     Abnormal cervical Papanicolaou smear 2018     Cervical high risk HPV (human papillomavirus) test positive 2018     Depressive disorder 2020     Gastroesophageal reflux disease without esophagitis 2020     Gestational HTN, third trimester 2023     Urinary tract infection        Past Surgical History:    Past Surgical History:   Procedure Laterality Date      SECTION N/A 2023    Procedure:  SECTION;  Surgeon: Ruth Ferreira MD;  Location:  L+D     COLPOSCOPY  2021     ENT SURGERY  2012    Tonsillectomy        Family History:    family history includes Depression in her father and sister; Diabetes  "in her maternal grandfather; Hyperlipidemia in her mother; Hypertension in her maternal grandmother; Mental Illness in her father; No Known Problems in her brother, paternal grandmother, and another family member; Thyroid Disease in her mother.    Social History:   reports that she has never smoked. She has never used smokeless tobacco. She reports that she does not currently use alcohol. She reports that she does not use drugs.  PCP: No Ref-Primary, Physician     Physical Exam     Patient Vitals for the past 24 hrs:   BP Temp Temp src Pulse Resp SpO2 Height Weight   03/21/23 2130 (!) 142/94 -- -- 78 15 97 % -- --   03/21/23 2022 (!) 151/96 -- -- 95 -- 97 % -- --   03/21/23 1920 (!) 150/102 97.2  F (36.2  C) Temporal 115 18 99 % 1.753 m (5' 9\") 79.4 kg (175 lb)        Physical Exam  Nursing note and vitals reviewed.    Constitutional:  Appears comfortable.    HENT:                Nose normal.  No discharge.      Oral mucosa is moist.  Eyes:    Conjunctivae are normal without injection.  Pupils are equal.  Cardiovascular:  Normal rate about 95, regular rhythm with normal S1 and S2.      Normal heart sounds and peripheral pulses 2+ and equal.       No murmur or carrie.  Pulmonary:  Effort normal and breath sounds clear to auscultation bilaterally.    No respiratory distress.  No stridor.     No wheezes. No rales.     GI:    Soft. No distension and no mass. No tenderness.      No flank pain.    Musculoskeletal:  Normal range of motion. No extremity deformity.     No edema and no tenderness.    Neurological:   Alert and oriented. No focal weakness.  Reflexes in the knee are 3+ bilaterally.  Skin:    Skin is warm and dry. No rash noted.   Psychiatric:   Behavior is normal. Appropriate mood and affect.     Judgment and thought content normal.         Emergency Department Course     ECG results from 03/21/23   EKG 12 lead     Value    Systolic Blood Pressure     Diastolic Blood Pressure     Ventricular Rate 114    Atrial " Rate 114    ID Interval 144    QRS Duration 90        QTc 537    P Axis 64    R AXIS 56    T Axis 28    Interpretation ECG      Sinus tachycardia with frequent Premature ventricular complexes  Cannot rule out Anterior infarct , age undetermined  ST & T wave abnormality, consider inferior ischemia  Prolonged QT  Abnormal ECG  When compared with ECG of 14-JUN-2021 13:38,  Premature ventricular complexes are now Present  Vent. rate has increased BY  40 BPM  T wave inversion more evident in Inferior leads  T wave inversion now evident in Lateral leads  Confirmed by GENERATED REPORT, COMPUTER ([id number]),  Aasen, Bradley ([id number]) on 3/21/2023 7:26:21 PM     I reviewed her EKG and noted the tachycardia and some inferior ST and T wave nonspecific changes along with a prolonged QT.  It is not significantly changed from previous EKG.  Pattie Mcnally MD      Imaging:  No orders to display      Report per radiology    Laboratory:  Labs Ordered and Resulted from Time of ED Arrival to Time of ED Departure   COMPREHENSIVE METABOLIC PANEL - Abnormal       Result Value    Sodium 140      Potassium 3.6      Chloride 104      Carbon Dioxide (CO2) 25      Anion Gap 11      Urea Nitrogen 10.0      Creatinine 0.67      Calcium 9.6      Glucose 101 (*)     Alkaline Phosphatase 73      AST 39 (*)     ALT 58 (*)     Protein Total 6.9      Albumin 4.7      Bilirubin Total 0.2      GFR Estimate >90     ROUTINE UA WITH MICROSCOPIC REFLEX TO CULTURE - Abnormal    Color Urine Light Yellow      Appearance Urine Clear      Glucose Urine Negative      Bilirubin Urine Negative      Ketones Urine Negative      Specific Gravity Urine 1.017      Blood Urine Negative      pH Urine 6.5      Protein Albumin Urine Negative      Urobilinogen Urine Normal      Nitrite Urine Negative      Leukocyte Esterase Urine Negative      Mucus Urine Present (*)     RBC Urine <1      WBC Urine <1      Squamous Epithelials Urine <1     CBC WITH PLATELETS AND  DIFFERENTIAL - Abnormal    WBC Count 5.9      RBC Count 4.91      Hemoglobin 13.4      Hematocrit 41.4      MCV 84      MCH 27.3      MCHC 32.4      RDW 16.3 (*)     Platelet Count 180      % Neutrophils 54      % Lymphocytes 35      % Monocytes 7      % Eosinophils 3      % Basophils 1      % Immature Granulocytes 0      NRBCs per 100 WBC 0      Absolute Neutrophils 3.2      Absolute Lymphocytes 2.1      Absolute Monocytes 0.4      Absolute Eosinophils 0.2      Absolute Basophils 0.1      Absolute Immature Granulocytes 0.0      Absolute NRBCs 0.0     TROPONIN T, HIGH SENSITIVITY - Normal    Troponin T, High Sensitivity <6     TSH WITH FREE T4 REFLEX - Normal    TSH 2.06     NT PROBNP INPATIENT - Normal    N terminal Pro BNP Inpatient 76     D DIMER QUANTITATIVE - Normal    D-Dimer Quantitative <0.27              Emergency Department Course & Assessments:      Interventions:  Medications   NIFEdipine ER OSMOTIC (PROCARDIA XL) 24 hr tablet 30 mg (has no administration in time range)          Independent Interpretation (X-rays, CTs, rhythm strip):  None    Consultations/Discussion of Management or Tests:  1055 Dr Arevalo from OB called and I discussed the case with her.       Social Determinants of Health affecting care:   None    Disposition:  The patient was discharged to home.     Impression & Plan        Medical Decision Making:  Patient is about 1 month postpartum and had a history of pregnancy-induced hypertension toward the end of her pregnancy.  She had early mild help syndrome and was in the hospital for a few days after delivery.  She developed palpitations today and comes in.  Her EKG looks normal.  Her exam reveals brisk reflexes but otherwise is normal.  Lab work came back with slight increase in her ALT and AST of 58 and 39, platelets are normal, renal function electrolytes are normal.  No protein in her urine.  Her highest blood pressure was 150/102 and now is at 142/94.  I contacted Dr. Arevalo from  her OB clinic and we discussed the case and went over her results.  She recommended 30 of Procardia XL tonight and then changing her to 30 mg twice a day starting tomorrow.  She should check home blood pressure once or twice a day and contact the clinic on Friday with an update and for further discussion.  If she gets worse with headaches blurry vision and worsening blood pressure she is to return to the ER.  Patient is understanding and will follow-up as recommended.    Change the nifedipine to 30 mg twice a day starting tomorrow.  Check your blood pressure a couple times a day while you are relaxed and sitting down.  Write it down in a journal.  Contact your OB clinic on Friday with an update to go over your readings.  If you develop significant headaches and blurry vision with worsening blood pressures, return to the emergency room.    Diagnosis:    ICD-10-CM    1. Postpartum hypertension  O16.5       2. Elevated LFTs  R79.89       3. Palpitations  R00.2            Discharge Medications:  New Prescriptions    No medications on file          Pattie Mcnally MD  3/21/2023   Pattie Mcnally MD Powell, Tracy Alan, MD  03/21/23 [id number]

## 2023-03-31 ENCOUNTER — HOSPITAL ENCOUNTER (EMERGENCY)
Facility: CLINIC | Age: 33
Discharge: LEFT BEFORE DISCHARGE | End: 2023-03-31
Attending: EMERGENCY MEDICINE | Admitting: EMERGENCY MEDICINE
Payer: COMMERCIAL

## 2023-03-31 ENCOUNTER — TELEPHONE (OUTPATIENT)
Dept: MIDWIFE SERVICES | Facility: CLINIC | Age: 33
End: 2023-03-31
Payer: COMMERCIAL

## 2023-03-31 VITALS
OXYGEN SATURATION: 96 % | DIASTOLIC BLOOD PRESSURE: 79 MMHG | SYSTOLIC BLOOD PRESSURE: 127 MMHG | RESPIRATION RATE: 22 BRPM | HEART RATE: 102 BPM | TEMPERATURE: 98.3 F

## 2023-03-31 DIAGNOSIS — F41.9 ANXIETY: ICD-10-CM

## 2023-03-31 PROCEDURE — 99281 EMR DPT VST MAYX REQ PHY/QHP: CPT

## 2023-03-31 ASSESSMENT — ACTIVITIES OF DAILY LIVING (ADL)
ADLS_ACUITY_SCORE: 35
ADLS_ACUITY_SCORE: 35

## 2023-03-31 NOTE — ED TRIAGE NOTES
Pt report jugular vein distension. Worse when lying down. Pt has video of this.  Pt noticed this starting last night. Pt report minimal pain in this area as well     Pt is 5 week post partum first child delivered at 36 weeks and problems with high BP     Pt on nifedipine 30 mg daily ER

## 2023-03-31 NOTE — ED PROVIDER NOTES
History   Chief Complaint:  Neck Throbbing     The history is provided by the patient.      Beata Joseph is a 32 year old female with a history of gestational and post partum hypertension who presents with neck throbbing. Patient reports that she is concerned for jugular vein distention after a part of her neck started pulsing last night and has continued. She states that she is 5 weeks post partum from her first child. The pulsing is worse when she lies down and is improved when sitting up. She notes that when the pulsating occurs, she feels discomfort. She adds that she delivered at 36 weeks, by , and had gestational hypertension.     Independent Historian:   None - Patient Only    Review of External Notes: None     ROS:  Review of Systems   HENT:        (+) neck throbbing   All other systems reviewed and are negative.    Allergies:  Cephalosporins  Cats  Pollen Extract     Medications:    Bupropion  Escitalopram   Nifedipine  Norethindrone  Omeprazole     Past Medical History:    Depression  GERD  Gestational hypertension  UTI  Gallbladder attack  Hypertriglyceridemia  White coat syndrome   Post partum hypertension  Anemia in pregnancy   COVID-19    Past Surgical History:      Tonsillectomy      Family History:    Father - depression, mental illness  Mother - thyroid disease, hyperlipidemia    Social History:  Presents alone  PCP: No Ref-Primary, Physician     Physical Exam     Patient Vitals for the past 24 hrs:   BP Temp Temp src Pulse Resp SpO2   23 1929 127/79 -- -- -- -- --   23 1916 (!) 140/86 -- -- -- -- --   23 1900 -- 98.3  F (36.8  C) Temporal -- 22 --   23 1859 (!) 151/86 -- -- 102 -- 96 %        Physical Exam   Constitutional: Young white female, sitting, no respiratory distress  HENT: No signs of trauma.   Eyes: EOM are normal. Pupils are equal, round, and reactive to light.   Neck: Normal range of motion. Carotid arteries palpable bilaterally. no bruit.  No JVD present. No cervical adenopathy.  Cardiovascular: Regular rhythm.  Exam reveals no gallop and no friction rub. 2+ radial pulses bilaterally.  No murmur heard.  Pulmonary/Chest: Bilateral breath sounds normal. No wheezes, rhonchi or rales.  Abdominal: Soft. No tenderness. No rebound or guarding.   Musculoskeletal: No edema of arms or legs. No tenderness.   Lymphadenopathy: No lymphadenopathy.   Neurological: Alert and oriented to person, place, and time. Normal strength. Coordination normal.   Skin: Skin is warm and dry. No rash noted. No erythema.     Emergency Department Course   Emergency Department Course & Assessments:  Assessments:   I obtained history and examined the patient, as above.   I went to recheck the patient, who had eloped.    Independent Interpretation (X-rays, CTs, rhythm strip):  Not applicable    Consultations/Discussion of Management or Tests:  None     Social Determinants of Health affecting care:   None    Disposition:  The patient eloped.    Impression & Plan    Medical Decision Making:  Beata Joseph is a 32 year old female who presents to the ED stating that she may have jugular vein distention and also took a photo of herself last night, when she was lying flat, where the carotid pulsations were visible. Patient is 5 weeks post partum. She had a  and she is pumping. She has been treated for post partum hypertension with nifedipine 30 mg daily. She states that her blood pressure is up a little bit now, because it normally runs in the 120s. It had been as high as 150 at home. On examination, she is neurologically intact. I see no neck vein distention and carotids have normal pulsations, without bruit. There was no arm or feet swelling. There were strong pulses. Patient seems anxious. We did take initial blood pressures in the both arms and then repeated those. The blood pressures came down significantly. The patient is more comfortable and before I could go and talk  with her again, she eloped. Patient will need to follow up with her primary. If symptoms change or worsen, return to the ED. She does not show any signs of acute PE, carotid dissection, acute coronary disease or heart failure.     Diagnosis:    ICD-10-CM    1. Hypertension, postpartum condition or complication  O16.5       2. Anxiety  F41.9            Scribe Disclosure:  I, Aye Hensley, am serving as a scribe at 7:03 PM on 3/31/2023 to document services personally performed by Vick De La O MD based on my observations and the provider's statements to me.        Vick De La O MD  04/01/23 0043

## 2023-03-31 NOTE — TELEPHONE ENCOUNTER
"Returned phone call to Beata regarding \"pulsing\" in neck with lying down. She confirms that it started last night and has continued throughout the course of the day. States that the pulses are visible to others and she has video of it happening from earlier today. She confirms that her blood pressures are normal, though slightly elevated from baseline (thinks this is related to stress regarding new symptoms). Denies dizziness, SOB, chest pain, swelling, palpitation, s/sx of high blood pressure. Patient confirms that she has a visit with primary care coming up, but it is just a video visit.    Recommended that patient either visit the ED or urgent care tonight for cardiovascular evaluation, especially with sudden onset. Pt agreeable to plan.    ALEJO Benitez CNM    "

## 2023-03-31 NOTE — TELEPHONE ENCOUNTER
2/20/23: Delivered  Pt has been taking 30mg nifedipine to control BPs  Pt noticing intense bounding pulse on the right side of her neck every time she lays down since yesterday night.  BPs are well controlled.  Pt states it happens right when she lays down, it stops as soon as she sits back up.  States when laying down it does continue to last for a few minutes at least.  Routing to provider to advise  Katie Heredia RN on 3/31/2023 at 2:57 PM

## 2023-04-04 ENCOUNTER — VIRTUAL VISIT (OUTPATIENT)
Dept: FAMILY MEDICINE | Facility: CLINIC | Age: 33
End: 2023-04-04
Attending: FAMILY MEDICINE
Payer: COMMERCIAL

## 2023-04-04 DIAGNOSIS — F43.0 ACUTE REACTION TO SITUATIONAL STRESS: ICD-10-CM

## 2023-04-04 DIAGNOSIS — F41.8 POSTPARTUM ANXIETY: ICD-10-CM

## 2023-04-04 DIAGNOSIS — F33.0 MILD EPISODE OF RECURRENT MAJOR DEPRESSIVE DISORDER (H): ICD-10-CM

## 2023-04-04 DIAGNOSIS — R03.0 ELEVATED BLOOD PRESSURE READING: ICD-10-CM

## 2023-04-04 PROCEDURE — 96127 BRIEF EMOTIONAL/BEHAV ASSMT: CPT | Mod: VID | Performed by: FAMILY MEDICINE

## 2023-04-04 PROCEDURE — 99214 OFFICE O/P EST MOD 30 MIN: CPT | Mod: VID | Performed by: FAMILY MEDICINE

## 2023-04-04 RX ORDER — NIFEDIPINE 30 MG/1
30 TABLET, EXTENDED RELEASE ORAL DAILY
Qty: 30 TABLET | Refills: 3 | Status: SHIPPED | OUTPATIENT
Start: 2023-04-04 | End: 2023-05-11

## 2023-04-04 ASSESSMENT — ANXIETY QUESTIONNAIRES
IF YOU CHECKED OFF ANY PROBLEMS ON THIS QUESTIONNAIRE, HOW DIFFICULT HAVE THESE PROBLEMS MADE IT FOR YOU TO DO YOUR WORK, TAKE CARE OF THINGS AT HOME, OR GET ALONG WITH OTHER PEOPLE: SOMEWHAT DIFFICULT
3. WORRYING TOO MUCH ABOUT DIFFERENT THINGS: MORE THAN HALF THE DAYS
5. BEING SO RESTLESS THAT IT IS HARD TO SIT STILL: NOT AT ALL
2. NOT BEING ABLE TO STOP OR CONTROL WORRYING: MORE THAN HALF THE DAYS
7. FEELING AFRAID AS IF SOMETHING AWFUL MIGHT HAPPEN: MORE THAN HALF THE DAYS
6. BECOMING EASILY ANNOYED OR IRRITABLE: MORE THAN HALF THE DAYS
1. FEELING NERVOUS, ANXIOUS, OR ON EDGE: MORE THAN HALF THE DAYS
GAD7 TOTAL SCORE: 11
GAD7 TOTAL SCORE: 11

## 2023-04-04 ASSESSMENT — PATIENT HEALTH QUESTIONNAIRE - PHQ9
5. POOR APPETITE OR OVEREATING: SEVERAL DAYS
SUM OF ALL RESPONSES TO PHQ QUESTIONS 1-9: 5

## 2023-04-04 NOTE — PROGRESS NOTES
Beata is a 32 year old who is being evaluated via a billable video visit.      How would you like to obtain your AVS? MyChart  If the video visit is dropped, the invitation should be resent by: Text to cell phone: [phone]  Will anyone else be joining your video visit? No      Assessment & Plan   32-year-old female, almost 6 weeks postpartum, transferring care back to primary clinic for management of postpartum essential hypertension.  She was on nifedipine 60 mg once a day.  Now I think nifedipine 30 mg once a day is keeping her blood pressure in range of less than 130/80.  I do recommend an office visit in next 4 weeks.  She does need recheck on her elevated ALT AST.  Postpartum essential hypertension during pregnancy, delivered  Elevated blood pressure reading  Plan: start excercise . Stay on low   - NIFEdipine ER OSMOTIC (PROCARDIA XL) 30 MG 24 hr tablet; Take 1 tablet (30 mg) by mouth daily  -CMP is future, that need to be completed either in follow-up in a month or as lab only appointment.  Mild episode of recurrent major depressive disorder (H)  Postpartum anxiety  Plan.  She was started on Lexapro 10 mg once a day, in past it has helped with anxiety quite a bit.  She is also taking Wellbutrin 150 mg once a day.  She does not need refill on either of these medication.  She is currently on weekly therapy as well.  Current anxiety escalated a little bit because of family situational stress and she feels that she is coping with the situational stress well as she is on Wellbutrin.  No refills needed.  Follow-up is advised in 4 weeks.    Acute reaction to situational stress  Coping well with the therapy.      Ordering of each unique test  Prescription drug management  I spent a total of 30 minutes on the day of the visit.   Time spent by me doing chart review, history and exam, documentation and further activities per the note       BMI:   Estimated body mass index is 25.84 kg/m  as calculated from the  "following:    Height as of 3/21/23: 1.753 m (5' 9\").    Weight as of 3/21/23: 79.4 kg (175 lb).           Serina Munguia MD  [facility]    Yan Cota is a 32 year old, presenting for the following health issues:  RECHECK (Transfer of care from midwife to primary)         View : No data to display.              History of Present Illness       Reason for visit:  Management of hypertension post partum  Symptom onset:  More than a month  Symptoms include:  Tight chest, feeling cold  Symptom intensity:  Mild  Symptom progression:  Improving  Had these symptoms before:  Yes  Has tried/received treatment for these symptoms:  Yes  Previous treatment was successful:  Yes  Prior treatment description:  Nifedipine 30mg 1x daily  What makes it worse:  Anxiety  What makes it better:  Relaxing    She eats 2-3 servings of fruits and vegetables daily.She consumes 1 sweetened beverage(s) daily.She exercises with enough effort to increase her heart rate 30 to 60 minutes per day.  She exercises with enough effort to increase her heart rate 3 or less days per week.   She is taking medications regularly.   Sever gestation hypertension.  She has been asked by and advised to establish care with PCP regarding management of blood pressure.  Induced at 36 weeks due to hypertension.  Delivered healthy daughter on   via       Did not tolerate labetalol during pregnancy.  Switch to nifedipine, initially 60 mg once a day.  She is breast feeding exclusively    Home Blood pressure cuff 116/81  Has been emergency room twice, reviewed with patient, most recently because she felt throbbing in neck, almost like palpitation.  And I think it was mostly from anxiety she denies any similar palpitation anymore.    She has not been able to exercise.  Anxiety is being managed with Lexapro that was recently started.  Previously tolerated Lexapro quite well.  She is also in therapy.  She feels current escalation " in therapy is from a family trauma/misunderstanding is situational stress and she is coping well with help of therapy as well and anticipating things will improve.     and mom in law very helpful with maci  Grand parents and uncles have hypertension and taking medications  Started oral contraceptive pills- progesterone only oral contraceptive pills              Review of Systems   Constitutional, HEENT, cardiovascular, pulmonary, GI, , musculoskeletal, neuro, skin, endocrine and psych systems are negative, except as otherwise noted.      Objective    Vitals - Patient Reported  Systolic (Patient Reported):  (n/a)  Diastolic (Patient Reported):  (n/a)  Weight (Patient Reported):  (n/a)  Height (Patient Reported):  (n/a)  SpO2 (Patient Reported):  (n/a)  Temperature (Patient Reported):  (n/a)  Pulse (Patient Reported):  (n/a)      Vitals:  No vitals were obtained today due to virtual visit.    Physical Exam   GENERAL: Healthy, alert and no distress  EYES: Eyes grossly normal to inspection.  No discharge or erythema, or obvious scleral/conjunctival abnormalities.  RESP: No audible wheeze, cough, or visible cyanosis.  No visible retractions or increased work of breathing.    SKIN: Visible skin clear. No significant rash, abnormal pigmentation or lesions.  NEURO: Cranial nerves grossly intact.  Mentation and speech appropriate for age.  PSYCH: Mentation appears normal, affect normal/bright, judgement and insight intact, normal speech and appearance well-groomed.      12/8/2022     7:39 PM 3/10/2023     8:56 AM 4/4/2023     9:50 AM   ANNEMARIE-7 SCORE   Total Score 8 (mild anxiety)     Total Score 8 12 11         1/13/2023     8:35 AM 3/10/2023     8:56 AM 4/4/2023     9:50 AM   PHQ   PHQ-9 Total Score 10 6 5   Q9: Thoughts of better off dead/self-harm past 2 weeks Not at all Not at all Not at all           Video-Visit Details    Type of service:  Video Visit     Originating Location (pt. Location): Home    Distant  Location (provider location):  On-site  Platform used for Video Visit: Juan Manuel

## 2023-04-04 NOTE — PROGRESS NOTES
Midwife Postpartum 6 Week Visit    Beata Joseph is a 32 year old here for a postpartum checkup.     Delivery date was 2023. She had a C/S delivery of a viable girl, 36w0d named Sammie, weight 6 pounds 2 oz., with complications of gestational HTN with severe features and PP preeclampsia w/ severe features requiring hospitalization.     Information for the patient's :  Sammie Joseph [[id number]]   6 lbs 2.06 oz      APGARs 8 , 8     Since delivery, she has been breast feeding.  She has not had any signs of infection, her lochia stopped after a few weeks.  Menstrual cycle started.  She has not had other complications.      BP: currently on Nifedipine 30 mg daily. She is seeing primary care for management. BP here was elevated, but at home it is normotensive.     She is voiding and having bowel movements without difficulty.  Good     Contraception was discussed and patient is on progesterone only oral contraceptives.   She has had intercourse since delivery. She complains of no  perineal discomfort.     Mood is Stable  Patient screened for postpartum depression. She is okay with her scores at this time and is seeing a therapist.     Depression Rating was:   Last PHQ-9 score on record =       2023     9:50 AM   PHQ-9 SCORE   PHQ-9 Total Score 4     Last GAD7 score on record =       2023     9:50 AM   ANNEMARIE-7 SCORE   Total Score 10     Alcohol Score = 2    ROS:  12 point review of systems negative other than symptoms noted below or in the HPI.       Current Outpatient Medications:      buPROPion (WELLBUTRIN XL) 150 MG 24 hr tablet, Take 1 tablet (150 mg) by mouth every morning, Disp: 30 tablet, Rfl: 11     escitalopram (LEXAPRO) 10 MG tablet, Take 1 tablet (10 mg) by mouth daily, Disp: 30 tablet, Rfl: 3     ibuprofen (ADVIL/MOTRIN) 800 MG tablet, Take 1 tablet (800 mg) by mouth every 6 hours as needed for other (cramping), Disp: , Rfl:      NIFEdipine ER OSMOTIC (PROCARDIA XL) 30 MG 24 hr tablet, Take 1  "tablet (30 mg) by mouth daily, Disp: 30 tablet, Rfl: 3     norethindrone (MICRONOR) 0.35 MG tablet, Take 1 tablet (0.35 mg) by mouth daily for 84 days, Disp: 84 tablet, Rfl: 0     omeprazole (PRILOSEC) 20 MG DR capsule, Take 1 capsule (20 mg) by mouth daily, Disp: 90 capsule, Rfl: 3     Prenat w/o L-NX-Kmnhoyt-FA-DHA (PNV-DHA PO), , Disp: , Rfl: .   OB History    Para Term  AB Living   1 1 0 1 0 1   SAB IAB Ectopic Multiple Live Births   0 0 0 0 1      # Outcome Date GA Lbr Dave/2nd Weight Sex Delivery Anes PTL Lv   1  23 36w0d  2.78 kg (6 lb 2.1 oz) F    CHANDRA      Name: SHERRI,FEMALE-ALLY      Apgar1: 8  Apgar5: 8     Last pap:    Lab Results   Component Value Date    PAP ASC-US 2021     Hgb in hospital was 13.4    EXAM:  BP (!) 150/98   Ht 1.753 m (5' 9\")   Wt 78.8 kg (173 lb 12.8 oz)   LMP 2022   Breastfeeding Yes   BMI 25.67 kg/m    BMI: Body mass index is 25.67 kg/m .  Constitutional: healthy, alert and no distress  Abdomen: soft, non-tender, diastasis <1 FB's      ASSESSMENT:   Normal postpartum exam after a  section.    ICD-10-CM    1. Routine postpartum follow-up  Z39.2       2. Gestational HTN, third trimester  O13.3       3. Mild episode of recurrent major depressive disorder (H)  F33.0       4. Postpartum anxiety  O99.345     F41.8             PLAN:  No results found for any visits on 23.    Return as needed or at time of next expected pap, pelvic, or breast exam.  Teaching: exercise, birth control and mental health  Family Planning:mini pill / progesterone only pill  Encourage Kegels and abdominal exercise.  Continue a multivitamin/prenatal supplement, especially if breastfeeding.  Pap smear was not obtained today.  Postpartum Hgb was not done today.    Return to clinic:  1 year for annual exam    Berta Sykes, DNP, APRN, CNM    "

## 2023-04-06 ENCOUNTER — PRENATAL OFFICE VISIT (OUTPATIENT)
Dept: MIDWIFE SERVICES | Facility: CLINIC | Age: 33
End: 2023-04-06
Payer: COMMERCIAL

## 2023-04-06 VITALS
DIASTOLIC BLOOD PRESSURE: 98 MMHG | HEIGHT: 69 IN | WEIGHT: 173.8 LBS | SYSTOLIC BLOOD PRESSURE: 150 MMHG | BODY MASS INDEX: 25.74 KG/M2

## 2023-04-06 DIAGNOSIS — O13.3 GESTATIONAL HTN, THIRD TRIMESTER: ICD-10-CM

## 2023-04-06 DIAGNOSIS — F41.8 POSTPARTUM ANXIETY: ICD-10-CM

## 2023-04-06 DIAGNOSIS — F33.0 MILD EPISODE OF RECURRENT MAJOR DEPRESSIVE DISORDER (H): ICD-10-CM

## 2023-04-06 PROBLEM — O99.013 ANEMIA AFFECTING PREGNANCY IN THIRD TRIMESTER: Status: RESOLVED | Noted: 2023-02-16 | Resolved: 2023-04-06

## 2023-04-06 PROBLEM — F43.0 ACUTE REACTION TO SITUATIONAL STRESS: Status: RESOLVED | Noted: 2023-04-04 | Resolved: 2023-04-06

## 2023-04-06 PROBLEM — D62 ANEMIA DUE TO BLOOD LOSS, ACUTE: Status: RESOLVED | Noted: 2023-02-23 | Resolved: 2023-04-06

## 2023-04-06 PROBLEM — R03.0 WHITE COAT SYNDROME WITHOUT DIAGNOSIS OF HYPERTENSION: Status: RESOLVED | Noted: 2021-06-14 | Resolved: 2023-04-06

## 2023-04-06 PROBLEM — R79.89 ELEVATED LFTS: Status: RESOLVED | Noted: 2023-02-23 | Resolved: 2023-04-06

## 2023-04-06 PROBLEM — O98.513 COVID-19 AFFECTING PREGNANCY IN THIRD TRIMESTER: Status: RESOLVED | Noted: 2023-01-13 | Resolved: 2023-04-06

## 2023-04-06 PROBLEM — U07.1 COVID-19 AFFECTING PREGNANCY IN THIRD TRIMESTER: Status: RESOLVED | Noted: 2023-01-13 | Resolved: 2023-04-06

## 2023-04-06 PROBLEM — O09.90 SUPERVISION OF HIGH-RISK PREGNANCY: Status: RESOLVED | Noted: 2022-08-19 | Resolved: 2023-04-06

## 2023-04-06 PROBLEM — O13.9 GESTATIONAL HYPERTENSION: Status: RESOLVED | Noted: 2023-02-23 | Resolved: 2023-04-06

## 2023-04-06 PROCEDURE — 99207 PR POST PARTUM EXAM: CPT | Performed by: ADVANCED PRACTICE MIDWIFE

## 2023-04-06 ASSESSMENT — ANXIETY QUESTIONNAIRES
1. FEELING NERVOUS, ANXIOUS, OR ON EDGE: MORE THAN HALF THE DAYS
6. BECOMING EASILY ANNOYED OR IRRITABLE: SEVERAL DAYS
7. FEELING AFRAID AS IF SOMETHING AWFUL MIGHT HAPPEN: MORE THAN HALF THE DAYS
IF YOU CHECKED OFF ANY PROBLEMS ON THIS QUESTIONNAIRE, HOW DIFFICULT HAVE THESE PROBLEMS MADE IT FOR YOU TO DO YOUR WORK, TAKE CARE OF THINGS AT HOME, OR GET ALONG WITH OTHER PEOPLE: SOMEWHAT DIFFICULT
GAD7 TOTAL SCORE: 10
2. NOT BEING ABLE TO STOP OR CONTROL WORRYING: MORE THAN HALF THE DAYS
5. BEING SO RESTLESS THAT IT IS HARD TO SIT STILL: NOT AT ALL
GAD7 TOTAL SCORE: 10
3. WORRYING TOO MUCH ABOUT DIFFERENT THINGS: MORE THAN HALF THE DAYS

## 2023-04-06 ASSESSMENT — PATIENT HEALTH QUESTIONNAIRE - PHQ9
5. POOR APPETITE OR OVEREATING: SEVERAL DAYS
SUM OF ALL RESPONSES TO PHQ QUESTIONS 1-9: 4

## 2023-04-11 ENCOUNTER — OFFICE VISIT (OUTPATIENT)
Dept: URGENT CARE | Facility: URGENT CARE | Age: 33
End: 2023-04-11
Payer: COMMERCIAL

## 2023-04-11 VITALS
HEART RATE: 74 BPM | BODY MASS INDEX: 25.55 KG/M2 | DIASTOLIC BLOOD PRESSURE: 81 MMHG | TEMPERATURE: 98.2 F | OXYGEN SATURATION: 97 % | WEIGHT: 173 LBS | SYSTOLIC BLOOD PRESSURE: 132 MMHG

## 2023-04-11 DIAGNOSIS — R30.0 DYSURIA: Primary | ICD-10-CM

## 2023-04-11 LAB
ALBUMIN UR-MCNC: NEGATIVE MG/DL
APPEARANCE UR: CLEAR
BACTERIA #/AREA URNS HPF: NORMAL /HPF
BILIRUB UR QL STRIP: NEGATIVE
CLUE CELLS: ABNORMAL
COLOR UR AUTO: YELLOW
GLUCOSE UR STRIP-MCNC: NEGATIVE MG/DL
HGB UR QL STRIP: NEGATIVE
KETONES UR STRIP-MCNC: NEGATIVE MG/DL
LEUKOCYTE ESTERASE UR QL STRIP: NEGATIVE
NITRATE UR QL: NEGATIVE
PH UR STRIP: 5.5 [PH] (ref 5–7)
RBC #/AREA URNS AUTO: NORMAL /HPF
SP GR UR STRIP: <=1.005 (ref 1–1.03)
SQUAMOUS #/AREA URNS AUTO: NORMAL /LPF
TRICHOMONAS, WET PREP: ABNORMAL
UROBILINOGEN UR STRIP-ACNC: 0.2 E.U./DL
WBC #/AREA URNS AUTO: NORMAL /HPF
WBC'S/HIGH POWER FIELD, WET PREP: ABNORMAL
YEAST, WET PREP: ABNORMAL

## 2023-04-11 PROCEDURE — 87210 SMEAR WET MOUNT SALINE/INK: CPT | Performed by: FAMILY MEDICINE

## 2023-04-11 PROCEDURE — 81001 URINALYSIS AUTO W/SCOPE: CPT | Performed by: FAMILY MEDICINE

## 2023-04-11 PROCEDURE — 99213 OFFICE O/P EST LOW 20 MIN: CPT | Performed by: FAMILY MEDICINE

## 2023-04-11 PROCEDURE — 87086 URINE CULTURE/COLONY COUNT: CPT | Performed by: FAMILY MEDICINE

## 2023-04-11 NOTE — PROGRESS NOTES
Chief Complaint   Patient presents with     Urgent Care     UTI     C/O dysuria for 1 day         Beata was seen today for urgent care and uti.    Diagnoses and all orders for this visit:    Dysuria  -     UA macro with reflex to Microscopic and Culture - Clinc Collect  -     Wet prep - Clinic Collect  -     Urine Microscopic Exam          Beata Joseph is a 32 year old female who presents for evaluation of uti  This clinically is consistent with a urinary tract infection.  Urinalysis confirms no nfection.  There has been no fever, back/flank pain or significant abdominal pain.  There is no clinical evidence of uti  pyelonephritis, appendicitis, colitis, diverticulitis or any intraabdominal catastrophe.  Urine culture was ordered.  Patient was encouraged to push more fluids and watch for any worsening symptoms.  Follow-up in clinic  if increasing pain, vomiting, fever, Follow up with primary physician is indicated if not improving in 2-3 days. Patient voices understanding and agreement with the plan.     Results for orders placed or performed in visit on 04/11/23   UA macro with reflex to Microscopic and Culture - Clinc Collect     Status: Normal    Specimen: Urine, Clean Catch   Result Value Ref Range    Color Urine Yellow Colorless, Straw, Light Yellow, Yellow    Appearance Urine Clear Clear    Glucose Urine Negative Negative mg/dL    Bilirubin Urine Negative Negative    Ketones Urine Negative Negative mg/dL    Specific Gravity Urine <=1.005 1.003 - 1.035    Blood Urine Negative Negative    pH Urine 5.5 5.0 - 7.0    Protein Albumin Urine Negative Negative mg/dL    Urobilinogen Urine 0.2 0.2, 1.0 E.U./dL    Nitrite Urine Negative Negative    Leukocyte Esterase Urine Negative Negative    Narrative    Microscopic not indicated   Urine Microscopic Exam     Status: Normal   Result Value Ref Range    Bacteria Urine None Seen None Seen /HPF    RBC Urine 0-2 0-2 /HPF /HPF    WBC Urine 0-5 0-5 /HPF /HPF    Squamous  Epithelials Urine None Seen None Seen /LPF   Wet prep - Clinic Collect     Status: Abnormal    Specimen: Vagina; Swab   Result Value Ref Range    Trichomonas Absent Absent    Yeast Absent Absent    Clue Cells Absent Absent    WBCs/high power field 2+ (A) None     SUBJECTIVE:   Beata Joseph is a 32 year old female status post , 7 weeks postpartum currently breast-feeding D5 from her first cycle after delivery who  presents today for a possible UTI. Symptoms of dysuria and frequency have been going on for 1day(s).  Hematuria no.  sudden onsetand moderate.  There is no history of fever, chills, nausea or vomiting.  No history of vaginal  discharge. This patient doeshave a history of urinary tract infections. Patient denies rigors, flank pain, temperature > 101 degrees F. and Vomiting, significant nausea or diarrhea or vaginal discharge     Past Medical History:   Diagnosis Date     Abnormal cervical Papanicolaou smear 2018, 21     Cervical high risk HPV (human papillomavirus) test positive 2018, 2019, 07/15/20, 21     Depressive disorder 2020     Gastroesophageal reflux disease without esophagitis 2020     Gestational HTN, third trimester 2023     Urinary tract infection      Current Outpatient Medications   Medication Sig Dispense Refill     buPROPion (WELLBUTRIN XL) 150 MG 24 hr tablet Take 1 tablet (150 mg) by mouth every morning 30 tablet 11     escitalopram (LEXAPRO) 10 MG tablet Take 1 tablet (10 mg) by mouth daily 30 tablet 3     ibuprofen (ADVIL/MOTRIN) 800 MG tablet Take 1 tablet (800 mg) by mouth every 6 hours as needed for other (cramping)       NIFEdipine ER OSMOTIC (PROCARDIA XL) 30 MG 24 hr tablet Take 1 tablet (30 mg) by mouth daily 30 tablet 3     norethindrone (MICRONOR) 0.35 MG tablet Take 1 tablet (0.35 mg) by mouth daily for 84 days 84 tablet 0     omeprazole (PRILOSEC) 20 MG DR capsule Take 1 capsule (20 mg) by mouth daily  90 capsule 3     Prenat w/o B-LJ-Svkfcei-FA-DHA (PNV-DHA PO)        Social History     Tobacco Use     Smoking status: Never     Passive exposure: Never     Smokeless tobacco: Never   Vaping Use     Vaping status: Never Used   Substance Use Topics     Alcohol use: Not Currently       ROS:   Review of systems negative except as stated above.    OBJECTIVE:  /81   Pulse 74   Temp 98.2  F (36.8  C) (Tympanic)   Wt 78.5 kg (173 lb)   SpO2 97%   Breastfeeding Yes   BMI 25.55 kg/m    GENERAL APPEARANCE: healthy, alert and no distress  RESP: lungs clear to auscultation - no rales, rhonchi or wheezes  CV: regular rates and rhythm, normal S1 S2, no murmur noted  ABDOMEN:  soft, nontender, no HSM or masses and bowel sounds normal  BACK: No CVA tenderness  SKIN: no suspicious lesions or rashes  PSYCH: mentation appears normal      Jovanna Tsang MD

## 2023-04-13 ENCOUNTER — TELEPHONE (OUTPATIENT)
Dept: FAMILY MEDICINE | Facility: CLINIC | Age: 33
End: 2023-04-13
Payer: COMMERCIAL

## 2023-04-13 LAB — BACTERIA UR CULT: NORMAL

## 2023-04-13 NOTE — TELEPHONE ENCOUNTER
Urgent Care Providers-Please review and advise.  Please route to appropriate care team pool.    Call received from patient:  1. Continues to experience burning and pain with urination, along with urinary urgency  2. Urine culture returned and wondering if she can be prescribed antibiotic medication?    Informed patient writer will send message high priority to Urgent Care team.    Patient verbalized understanding and in agreement with plan.    Pharmacy confirmed with patient and pended.    Thank you!  TORIN AndradeN, RN-[facility]

## 2023-04-13 NOTE — TELEPHONE ENCOUNTER
Ruba Vinson, NP  [facility] Nurse 8 minutes ago (3:00 PM)     PM  Please let her know that her urine culture was negative for true UTI.  It just had slight amount of normal urogenital sudha.  Her wet prep was also unremarkable.  If she continues to have worsening symptoms she needs to have repeat testing done either through primary OB/GYN or urgent care to ensure proper treatment.  I am not comfortable treating with an antibiotic since her urine was normal.      Spoke with patient regarding above information from Ruba Bernal, [facility]

## 2023-04-14 ENCOUNTER — OFFICE VISIT (OUTPATIENT)
Dept: URGENT CARE | Facility: URGENT CARE | Age: 33
End: 2023-04-14
Payer: COMMERCIAL

## 2023-04-14 ENCOUNTER — MYC MEDICAL ADVICE (OUTPATIENT)
Dept: FAMILY MEDICINE | Facility: CLINIC | Age: 33
End: 2023-04-14

## 2023-04-14 VITALS
DIASTOLIC BLOOD PRESSURE: 91 MMHG | SYSTOLIC BLOOD PRESSURE: 136 MMHG | BODY MASS INDEX: 25.25 KG/M2 | OXYGEN SATURATION: 97 % | TEMPERATURE: 98.5 F | HEART RATE: 107 BPM | WEIGHT: 171 LBS

## 2023-04-14 DIAGNOSIS — B37.31 YEAST INFECTION OF THE VAGINA: ICD-10-CM

## 2023-04-14 DIAGNOSIS — N30.00 ACUTE CYSTITIS WITHOUT HEMATURIA: Primary | ICD-10-CM

## 2023-04-14 DIAGNOSIS — F33.0 MILD EPISODE OF RECURRENT MAJOR DEPRESSIVE DISORDER (H): ICD-10-CM

## 2023-04-14 DIAGNOSIS — F41.9 ANXIETY: ICD-10-CM

## 2023-04-14 DIAGNOSIS — F41.8 POSTPARTUM ANXIETY: ICD-10-CM

## 2023-04-14 DIAGNOSIS — R39.89 URINARY PROBLEM: ICD-10-CM

## 2023-04-14 LAB
ALBUMIN UR-MCNC: NEGATIVE MG/DL
APPEARANCE UR: CLEAR
BACTERIA #/AREA URNS HPF: ABNORMAL /HPF
BILIRUB UR QL STRIP: NEGATIVE
CLUE CELLS: ABNORMAL
COLOR UR AUTO: YELLOW
GLUCOSE UR STRIP-MCNC: NEGATIVE MG/DL
HGB UR QL STRIP: NEGATIVE
KETONES UR STRIP-MCNC: NEGATIVE MG/DL
LEUKOCYTE ESTERASE UR QL STRIP: ABNORMAL
NITRATE UR QL: NEGATIVE
PH UR STRIP: 6.5 [PH] (ref 5–7)
RBC #/AREA URNS AUTO: ABNORMAL /HPF
SP GR UR STRIP: 1.01 (ref 1–1.03)
SQUAMOUS #/AREA URNS AUTO: ABNORMAL /LPF
TRICHOMONAS, WET PREP: ABNORMAL
UROBILINOGEN UR STRIP-ACNC: 0.2 E.U./DL
WBC #/AREA URNS AUTO: ABNORMAL /HPF
WBC'S/HIGH POWER FIELD, WET PREP: ABNORMAL
YEAST, WET PREP: PRESENT

## 2023-04-14 PROCEDURE — 87186 SC STD MICRODIL/AGAR DIL: CPT | Performed by: PHYSICIAN ASSISTANT

## 2023-04-14 PROCEDURE — 87210 SMEAR WET MOUNT SALINE/INK: CPT | Performed by: PHYSICIAN ASSISTANT

## 2023-04-14 PROCEDURE — 87086 URINE CULTURE/COLONY COUNT: CPT | Performed by: PHYSICIAN ASSISTANT

## 2023-04-14 PROCEDURE — 99214 OFFICE O/P EST MOD 30 MIN: CPT | Performed by: PHYSICIAN ASSISTANT

## 2023-04-14 PROCEDURE — 81001 URINALYSIS AUTO W/SCOPE: CPT | Performed by: PHYSICIAN ASSISTANT

## 2023-04-14 RX ORDER — BUPROPION HYDROCHLORIDE 150 MG/1
150 TABLET ORAL EVERY MORNING
Qty: 30 TABLET | Refills: 5 | Status: SHIPPED | OUTPATIENT
Start: 2023-04-14 | End: 2023-05-11

## 2023-04-14 RX ORDER — ESCITALOPRAM OXALATE 10 MG/1
10 TABLET ORAL DAILY
Qty: 30 TABLET | Refills: 3 | Status: SHIPPED | OUTPATIENT
Start: 2023-04-14 | End: 2023-05-11

## 2023-04-14 RX ORDER — CEPHALEXIN 500 MG/1
500 CAPSULE ORAL 2 TIMES DAILY
Qty: 20 CAPSULE | Refills: 0 | Status: SHIPPED | OUTPATIENT
Start: 2023-04-14 | End: 2023-04-24

## 2023-04-14 NOTE — TELEPHONE ENCOUNTER
Prescription approved per [facility] Refill Protocol.  Change in pharmacy noted.  Mikayla RAE RN

## 2023-04-14 NOTE — PROGRESS NOTES
Assessment & Plan     Acute cystitis without hematuria  Keflex as ordered, treated longer given pt is is post partum.     Push fluids, rest and ibuprofen or tylenol for comfort.    RTC for persistent or worsening sx.   Await culture.  PI given and discussed.    Return for fevers, abdomen pain, back pain,.    - cephALEXin (KEFLEX) 500 MG capsule  Dispense: 20 capsule; Refill: 0    Yeast infection of the vagina  Diflucan as ordered.      Urinary problem    - UA macro with reflex to Microscopic and Culture - Clinc Collect  - Wet prep - lab collect  - Wet prep - lab collect  - UA Microscopic with Reflex to Culture  - Urine Culture       Meron Hale PA-C  [facility] U[facility]ENT [facility] BRAULIO Cota is a 32 year old female who presents to clinic today for the following health issues:  Chief Complaint   Patient presents with     Dysuria     Frequency, was seen on 4/11/2023 and urine didn't show anything      HPI  Pt presents with concerns re: dysuria, frequency, urgency.  No fevers, nausea, vomiting, abdomen pain or back pain.    Hx of uti, states she feels this to be typical sx.    She is post partum 7 weeks, c section delivery. Nursing baby who is doing well.  Some vulvar irritation.    Was seen earlier in the week and UA was unremarkable.      Review of Systems  Constitutional, HEENT, cardiovascular, pulmonary, gi and gu systems are negative, except as otherwise noted.      Objective    BP (!) 136/91 (BP Location: Left arm, Patient Position: Sitting, Cuff Size: Adult Large)   Pulse 107   Temp 98.5  F (36.9  C) (Tympanic)   Wt 77.6 kg (171 lb)   LMP 04/06/2023 (Approximate)   SpO2 97%   Breastfeeding Yes   BMI 25.25 kg/m    Physical Exam   Patient is in no acute distress and appears well.  No costovertebral angle tenderness is present.  Abdomen is soft nontender to light or deep palpation no masses or hepatosplenomegaly present.      Results for orders placed or performed in visit on 04/14/23    UA macro with reflex to Microscopic and Culture - Clinc Collect     Status: Abnormal    Specimen: Urine, Midstream   Result Value Ref Range    Color Urine Yellow Colorless, Straw, Light Yellow, Yellow    Appearance Urine Clear Clear    Glucose Urine Negative Negative mg/dL    Bilirubin Urine Negative Negative    Ketones Urine Negative Negative mg/dL    Specific Gravity Urine 1.010 1.003 - 1.035    Blood Urine Negative Negative    pH Urine 6.5 5.0 - 7.0    Protein Albumin Urine Negative Negative mg/dL    Urobilinogen Urine 0.2 0.2, 1.0 E.U./dL    Nitrite Urine Negative Negative    Leukocyte Esterase Urine Small (A) Negative   UA Microscopic with Reflex to Culture     Status: Abnormal   Result Value Ref Range    Bacteria Urine Few (A) None Seen /HPF    RBC Urine 2-5 (A) 0-2 /HPF /HPF    WBC Urine 25-50 (A) 0-5 /HPF /HPF    Squamous Epithelials Urine Few (A) None Seen /LPF   Wet prep - lab collect     Status: Abnormal    Specimen: Vagina; Swab   Result Value Ref Range    Trichomonas Absent Absent    Yeast Present (A) Absent    Clue Cells Absent Absent    WBCs/high power field 3+ (A) None

## 2023-04-16 LAB
BACTERIA UR CULT: ABNORMAL
BACTERIA UR CULT: ABNORMAL

## 2023-05-11 ENCOUNTER — OFFICE VISIT (OUTPATIENT)
Dept: FAMILY MEDICINE | Facility: CLINIC | Age: 33
End: 2023-05-11
Attending: FAMILY MEDICINE
Payer: COMMERCIAL

## 2023-05-11 VITALS
DIASTOLIC BLOOD PRESSURE: 88 MMHG | WEIGHT: 172.2 LBS | SYSTOLIC BLOOD PRESSURE: 150 MMHG | TEMPERATURE: 97.3 F | HEIGHT: 69 IN | OXYGEN SATURATION: 98 % | HEART RATE: 78 BPM | BODY MASS INDEX: 25.51 KG/M2 | RESPIRATION RATE: 14 BRPM

## 2023-05-11 DIAGNOSIS — Z78.9 ALCOHOL USE: ICD-10-CM

## 2023-05-11 DIAGNOSIS — Z30.09 FAMILY PLANNING: ICD-10-CM

## 2023-05-11 DIAGNOSIS — Z13.1 SCREENING FOR DIABETES MELLITUS: ICD-10-CM

## 2023-05-11 DIAGNOSIS — F41.9 ANXIETY: ICD-10-CM

## 2023-05-11 DIAGNOSIS — F43.9 SITUATIONAL STRESS: ICD-10-CM

## 2023-05-11 DIAGNOSIS — I10 BENIGN ESSENTIAL HYPERTENSION: Primary | ICD-10-CM

## 2023-05-11 DIAGNOSIS — Z23 NEED FOR VACCINATION: ICD-10-CM

## 2023-05-11 DIAGNOSIS — Z83.49 FAMILY HISTORY OF HYPOTHYROIDISM: ICD-10-CM

## 2023-05-11 DIAGNOSIS — F33.0 MILD EPISODE OF RECURRENT MAJOR DEPRESSIVE DISORDER (H): ICD-10-CM

## 2023-05-11 LAB
ALBUMIN SERPL BCG-MCNC: 4.8 G/DL (ref 3.5–5.2)
ALP SERPL-CCNC: 71 U/L (ref 35–104)
ALT SERPL W P-5'-P-CCNC: 45 U/L (ref 10–35)
ANION GAP SERPL CALCULATED.3IONS-SCNC: 15 MMOL/L (ref 7–15)
AST SERPL W P-5'-P-CCNC: 30 U/L (ref 10–35)
BILIRUB SERPL-MCNC: 0.3 MG/DL
BUN SERPL-MCNC: 9.2 MG/DL (ref 6–20)
CALCIUM SERPL-MCNC: 9.8 MG/DL (ref 8.6–10)
CHLORIDE SERPL-SCNC: 105 MMOL/L (ref 98–107)
CREAT SERPL-MCNC: 0.65 MG/DL (ref 0.51–0.95)
DEPRECATED HCO3 PLAS-SCNC: 21 MMOL/L (ref 22–29)
GFR SERPL CREATININE-BSD FRML MDRD: >90 ML/MIN/1.73M2
GGT SERPL-CCNC: 21 U/L (ref 5–36)
GLUCOSE SERPL-MCNC: 90 MG/DL (ref 70–99)
HBA1C MFR BLD: 5.1 % (ref 0–5.6)
POTASSIUM SERPL-SCNC: 4 MMOL/L (ref 3.4–5.3)
PROT SERPL-MCNC: 7.5 G/DL (ref 6.4–8.3)
SODIUM SERPL-SCNC: 141 MMOL/L (ref 136–145)

## 2023-05-11 PROCEDURE — 99214 OFFICE O/P EST MOD 30 MIN: CPT | Mod: 25 | Performed by: FAMILY MEDICINE

## 2023-05-11 PROCEDURE — 82977 ASSAY OF GGT: CPT | Performed by: FAMILY MEDICINE

## 2023-05-11 PROCEDURE — 36415 COLL VENOUS BLD VENIPUNCTURE: CPT | Performed by: FAMILY MEDICINE

## 2023-05-11 PROCEDURE — 91312 COVID-19 BIVALENT 12+ (PFIZER): CPT | Performed by: FAMILY MEDICINE

## 2023-05-11 PROCEDURE — 80053 COMPREHEN METABOLIC PANEL: CPT | Performed by: FAMILY MEDICINE

## 2023-05-11 PROCEDURE — 96127 BRIEF EMOTIONAL/BEHAV ASSMT: CPT | Performed by: FAMILY MEDICINE

## 2023-05-11 PROCEDURE — 0124A COVID-19 BIVALENT 12+ (PFIZER): CPT | Performed by: FAMILY MEDICINE

## 2023-05-11 PROCEDURE — 83036 HEMOGLOBIN GLYCOSYLATED A1C: CPT | Performed by: FAMILY MEDICINE

## 2023-05-11 RX ORDER — ACETAMINOPHEN AND CODEINE PHOSPHATE 120; 12 MG/5ML; MG/5ML
0.35 SOLUTION ORAL DAILY
Qty: 84 TABLET | Refills: 3 | Status: SHIPPED | OUTPATIENT
Start: 2023-05-11 | End: 2023-05-19

## 2023-05-11 RX ORDER — ESCITALOPRAM OXALATE 20 MG/1
20 TABLET ORAL DAILY
Qty: 30 TABLET | Refills: 3 | Status: SHIPPED | OUTPATIENT
Start: 2023-05-11 | End: 2023-05-19

## 2023-05-11 RX ORDER — BUPROPION HYDROCHLORIDE 75 MG/1
75 TABLET ORAL DAILY
Qty: 30 TABLET | Refills: 3 | Status: SHIPPED | OUTPATIENT
Start: 2023-05-11 | End: 2023-07-27

## 2023-05-11 RX ORDER — NIFEDIPINE 30 MG/1
30 TABLET, EXTENDED RELEASE ORAL DAILY
Qty: 30 TABLET | Refills: 3 | Status: SHIPPED | OUTPATIENT
Start: 2023-05-11 | End: 2023-05-19

## 2023-05-11 ASSESSMENT — ANXIETY QUESTIONNAIRES
5. BEING SO RESTLESS THAT IT IS HARD TO SIT STILL: NOT AT ALL
7. FEELING AFRAID AS IF SOMETHING AWFUL MIGHT HAPPEN: MORE THAN HALF THE DAYS
2. NOT BEING ABLE TO STOP OR CONTROL WORRYING: MORE THAN HALF THE DAYS
IF YOU CHECKED OFF ANY PROBLEMS ON THIS QUESTIONNAIRE, HOW DIFFICULT HAVE THESE PROBLEMS MADE IT FOR YOU TO DO YOUR WORK, TAKE CARE OF THINGS AT HOME, OR GET ALONG WITH OTHER PEOPLE: VERY DIFFICULT
6. BECOMING EASILY ANNOYED OR IRRITABLE: SEVERAL DAYS
3. WORRYING TOO MUCH ABOUT DIFFERENT THINGS: MORE THAN HALF THE DAYS
GAD7 TOTAL SCORE: 11
1. FEELING NERVOUS, ANXIOUS, OR ON EDGE: NEARLY EVERY DAY
GAD7 TOTAL SCORE: 11

## 2023-05-11 ASSESSMENT — PAIN SCALES - GENERAL: PAINLEVEL: NO PAIN (0)

## 2023-05-11 ASSESSMENT — PATIENT HEALTH QUESTIONNAIRE - PHQ9
5. POOR APPETITE OR OVEREATING: SEVERAL DAYS
SUM OF ALL RESPONSES TO PHQ QUESTIONS 1-9: 3

## 2023-05-11 NOTE — NURSING NOTE
Per orders of Dr. Munguia, injection of COVID given by Antonieta Griffin RN. Prior to immunization administration, verified patients identity using patient s name and date of birth. Patient instructed to remain in clinic for 15 minutes afterwards, and to report any adverse reaction to me or clinic staff immediately.    Antonieta Griffin RN on 5/11/2023 at 11:20 AM.    Please see Immunization Activity for additional information.

## 2023-05-11 NOTE — PROGRESS NOTES
Assessment & Plan     Benign essential hypertension  Plan:pt reports due to situational stress Blood pressure is high  At home has own Blood pressure and will recheck and send [employer] message.  Target BP is < 140/90  I think her Blood pressure is beyond postpartum htn and she has had elevated Blood pressure without dxd hypertension for 1-2 yrs  Follow-up is advised in 4 weeks earlier if more concerns.  - NIFEdipine ER OSMOTIC (PROCARDIA XL) 30 MG 24 hr tablet; Take 1 tablet (30 mg) by mouth daily  - Comprehensive metabolic panel (BMP + Alb, Alk Phos, ALT, AST, Total. Bili, TP); Future  - Comprehensive metabolic panel (BMP + Alb, Alk Phos, ALT, AST, Total. Bili, TP)    Mild episode of recurrent major depressive disorder (H)  Plan: due to escalated anxiety- which  Is multifactorial- advised to decrease Wellbutrin from  150 to 75 mg once daily and increase lexapro to 20 mg once daily and follow up on phone in 4 weeks, earlier if concerns    - buPROPion (WELLBUTRIN) 75 MG tablet; Take 1 tablet (75 mg) by mouth daily  Recheck is advised in 4 weeks        4/4/2023     9:50 AM 4/6/2023     9:50 AM 5/11/2023    11:00 AM   PHQ   PHQ-9 Total Score 5 4 3   Q9: Thoughts of better off dead/self-harm past 2 weeks Not at all Not at all Not at all     Anxiety  Plan.  Advised to increase Lexapro from 10 to 20 mg, decrease Wellbutrin to 75 mg once a day.  Continue counseling every 2-week.  It is good that she is a runner because that is her ideal physical activity and that helpful for mental health as well.  She is returning to work and reports that is currently a productive change in life.  She is advised telephone only follow-up in 4 weeks.    - escitalopram (LEXAPRO) 20 MG tablet; Take 1 tablet (20 mg) by mouth daily      4/4/2023     9:50 AM 4/6/2023     9:50 AM 5/11/2023    11:00 AM   ANNEMARIE-7 SCORE   Total Score 11 10 11       Alcohol use  Advised to avoid alcohol.  - GGT; Future  - GGT    Family planning  Med refill given side  "effects are discussed in detail.  - norethindrone (MICRONOR) 0.35 MG tablet; Take 1 tablet (0.35 mg) by mouth daily    Need for vaccination    - COVID-19 BIVALENT 12+ (PFIZER)    Screening for diabetes mellitus  - Hemoglobin A1c; Future  - Hemoglobin A1c    Situational stress  Plan ; patient reports her older sister is an abusive relationship/marriage and has great kids.  Patient had to spend a few minutes with her and that escalated her own anxiety.  Encouraged counseling.  Encouraged physical activity of choice.    Family history of hypothyroidism  Mom has hypothyroidism.  Her most recent TSH checked was normal March 2023.      Ordering of each unique test  Prescription drug management  I spent a total of 30 minutes on the day of the visit.   Time spent by me doing chart review, history and exam, documentation and further activities per the note       BMI:   Estimated body mass index is 25.43 kg/m  as calculated from the following:    Height as of this encounter: 1.753 m (5' 9\").    Weight as of this encounter: 78.1 kg (172 lb 3.2 oz).   Weight management plan: Discussed healthy diet and exercise guidelines    Work on weight loss    Serina Munguia MD  [facility] is a 32 year old, presenting for the following health issues:  Hypertension        4/4/2023     9:05 AM   Additional Questions   Roomed by elise blackmon   Accompanied by n/a     History of Present Illness       Hypertension: She presents for follow up of hypertension.  She does check blood pressure  regularly outside of the clinic. Outpatient blood pressures have not been over 140/90. She does not follow a low salt diet.     She eats 2-3 servings of fruits and vegetables daily.She consumes 1 sweetened beverage(s) daily.She exercises with enough effort to increase her heart rate 30 to 60 minutes per day.  She exercises with enough effort to increase her heart rate 3 or less days per week.   She is taking " "medications regularly.     Situational stress at home.  Oldest sister in abusive relationship and she is  with kids.  Patient had to spend a few nights with her and that escalated her own anxiety.  This week was the hardest.  She has been taking her own medication Lexapro at 10 mg and Wellbutrin at 150 mg once a day.  She is a runner and is trying to get back to her routine running.  She is also looking forward to resuming work and feels that it would be good for her mental health as well.    She strongly feels that her blood pressure at home is always well within normal limits of systolic 120s diastolic 80.  She also feels that the blood pressure had been elevated even prior to pregnancy.  She has a 3-month old at home.  Maternal uncle had her blood pressure so there is a strong family history of hypertension as well.    She does consume alcohol but reports no significant daily consumption or alcohol abuse.                  Review of Systems   Constitutional, HEENT, cardiovascular, pulmonary, GI, , musculoskeletal, neuro, skin, endocrine and psych systems are negative, except as otherwise noted.      Objective    BP (!) 150/88   Pulse 78   Temp 97.3  F (36.3  C) (Temporal)   Resp 14   Ht 1.753 m (5' 9\")   Wt 78.1 kg (172 lb 3.2 oz)   LMP 04/06/2023 (Approximate)   SpO2 98%   Breastfeeding Yes   BMI 25.43 kg/m    Body mass index is 25.43 kg/m .  Physical Exam   GENERAL: healthy, alert and no distress  NECK: no adenopathy, no asymmetry, masses, or scars and thyroid normal to palpation  RESP: lungs clear to auscultation - no rales, rhonchi or wheezes  CV: regular rate and rhythm, normal S1 S2, no S3 or S4, no murmur, click or rub, no peripheral edema and peripheral pulses strong  ABDOMEN: soft, nontender, no hepatosplenomegaly, no masses and bowel sounds normal  MS: no gross musculoskeletal defects noted, no edema  PSYCH: mentation appears normal, affect normal/bright    Results for orders placed or " performed in visit on 05/11/23   Comprehensive metabolic panel (BMP + Alb, Alk Phos, ALT, AST, Total. Bili, TP)     Status: Abnormal   Result Value Ref Range    Sodium 141 136 - 145 mmol/L    Potassium 4.0 3.4 - 5.3 mmol/L    Chloride 105 98 - 107 mmol/L    Carbon Dioxide (CO2) 21 (L) 22 - 29 mmol/L    Anion Gap 15 7 - 15 mmol/L    Urea Nitrogen 9.2 6.0 - 20.0 mg/dL    Creatinine 0.65 0.51 - 0.95 mg/dL    Calcium 9.8 8.6 - 10.0 mg/dL    Glucose 90 70 - 99 mg/dL    Alkaline Phosphatase 71 35 - 104 U/L    AST 30 10 - 35 U/L    ALT 45 (H) 10 - 35 U/L    Protein Total 7.5 6.4 - 8.3 g/dL    Albumin 4.8 3.5 - 5.2 g/dL    Bilirubin Total 0.3 <=1.2 mg/dL    GFR Estimate >90 >60 mL/min/1.73m2   GGT     Status: Normal   Result Value Ref Range    GGT 21 5 - 36 U/L   Hemoglobin A1c     Status: Normal   Result Value Ref Range    Hemoglobin A1C 5.1 0.0 - 5.6 %

## 2023-05-15 ENCOUNTER — PATIENT OUTREACH (OUTPATIENT)
Dept: CARE COORDINATION | Facility: CLINIC | Age: 33
End: 2023-05-15
Payer: COMMERCIAL

## 2023-05-15 PROBLEM — Z83.49 FAMILY HISTORY OF HYPOTHYROIDISM: Status: ACTIVE | Noted: 2023-05-15

## 2023-05-15 PROBLEM — F43.9 SITUATIONAL STRESS: Status: ACTIVE | Noted: 2023-05-15

## 2023-05-18 ENCOUNTER — MYC MEDICAL ADVICE (OUTPATIENT)
Dept: FAMILY MEDICINE | Facility: CLINIC | Age: 33
End: 2023-05-18
Payer: COMMERCIAL

## 2023-05-18 DIAGNOSIS — I10 BENIGN ESSENTIAL HYPERTENSION: ICD-10-CM

## 2023-05-18 DIAGNOSIS — Z30.09 FAMILY PLANNING: ICD-10-CM

## 2023-05-18 DIAGNOSIS — F41.9 ANXIETY: ICD-10-CM

## 2023-05-19 RX ORDER — ESCITALOPRAM OXALATE 20 MG/1
20 TABLET ORAL DAILY
Qty: 30 TABLET | Refills: 3 | Status: SHIPPED | OUTPATIENT
Start: 2023-05-19 | End: 2023-07-10

## 2023-05-19 RX ORDER — NIFEDIPINE 30 MG/1
30 TABLET, EXTENDED RELEASE ORAL DAILY
Qty: 30 TABLET | Refills: 3 | Status: SHIPPED | OUTPATIENT
Start: 2023-05-19 | End: 2023-07-27

## 2023-05-19 RX ORDER — ACETAMINOPHEN AND CODEINE PHOSPHATE 120; 12 MG/5ML; MG/5ML
0.35 SOLUTION ORAL DAILY
Qty: 84 TABLET | Refills: 3 | Status: SHIPPED | OUTPATIENT
Start: 2023-05-19 | End: 2024-03-11

## 2023-05-19 NOTE — TELEPHONE ENCOUNTER
Prescription approved per [facility] Refill Protocol.  Change in pharmacy noted.  Mikayla RAE RN

## 2023-05-30 ENCOUNTER — PATIENT OUTREACH (OUTPATIENT)
Dept: CARE COORDINATION | Facility: CLINIC | Age: 33
End: 2023-05-30
Payer: COMMERCIAL

## 2023-07-18 ENCOUNTER — OFFICE VISIT (OUTPATIENT)
Dept: URGENT CARE | Facility: URGENT CARE | Age: 33
End: 2023-07-18
Payer: COMMERCIAL

## 2023-07-18 VITALS
TEMPERATURE: 98.1 F | OXYGEN SATURATION: 98 % | HEART RATE: 83 BPM | SYSTOLIC BLOOD PRESSURE: 139 MMHG | BODY MASS INDEX: 25.4 KG/M2 | DIASTOLIC BLOOD PRESSURE: 87 MMHG | WEIGHT: 172 LBS

## 2023-07-18 DIAGNOSIS — S70.362A TICK BITE OF LEFT THIGH, INITIAL ENCOUNTER: Primary | ICD-10-CM

## 2023-07-18 DIAGNOSIS — A69.20 ERYTHEMA MIGRANS (LYME DISEASE): ICD-10-CM
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
## 2023-07-18 LAB
ERYTHROCYTE [DISTWIDTH] IN BLOOD BY AUTOMATED COUNT: 13 % (ref 10–15)
HCT VFR BLD AUTO: 40.8 % (ref 35–47)
HGB BLD-MCNC: 13.4 G/DL (ref 11.7–15.7)
MCH RBC QN AUTO: 29.8 PG (ref 26.5–33)
MCHC RBC AUTO-ENTMCNC: 32.8 G/DL (ref 31.5–36.5)
MCV RBC AUTO: 91 FL (ref 78–100)
PLATELET # BLD AUTO: 218 10E3/UL (ref 150–450)
RBC # BLD AUTO: 4.5 10E6/UL (ref 3.8–5.2)
WBC # BLD AUTO: 5.9 10E3/UL (ref 4–11)

## 2023-07-18 PROCEDURE — 36415 COLL VENOUS BLD VENIPUNCTURE: CPT | Performed by: FAMILY MEDICINE

## 2023-07-18 PROCEDURE — 85027 COMPLETE CBC AUTOMATED: CPT | Performed by: FAMILY MEDICINE

## 2023-07-18 PROCEDURE — 86618 LYME DISEASE ANTIBODY: CPT | Performed by: FAMILY MEDICINE

## 2023-07-18 PROCEDURE — 99213 OFFICE O/P EST LOW 20 MIN: CPT | Performed by: FAMILY MEDICINE

## 2023-07-18 RX ORDER — DOXYCYCLINE 100 MG/1
100 CAPSULE ORAL 2 TIMES DAILY
Qty: 20 CAPSULE | Refills: 0 | Status: SHIPPED | OUTPATIENT
Start: 2023-07-18 | End: 2023-07-28

## 2023-07-18 NOTE — PROGRESS NOTES
Chief Complaint   Patient presents with     Urgent Care     Tick Bite     C/O tick bite for 4 days         Beata was seen today for urgent care and tick bite.    Diagnoses and all orders for this visit:    Tick bite of left thigh, initial encounter  -     CBC with platelets; Future  -     Lyme Disease Total Abs Bld with Reflex to Confirm CLIA; Future  -     CBC with platelets  -     Lyme Disease Total Abs Bld with Reflex to Confirm CLIA    Erythema migrans (Lyme disease)  -     doxycycline hyclate (VIBRAMYCIN) 100 MG capsule; Take 1 capsule (100 mg) by mouth 2 times daily for 10 days      Differential diagnosis insect bite/exaggerated reaction from a bite/tick bite/cellulitis/erythema migrans  Results for orders placed or performed in visit on 07/18/23   CBC with platelets     Status: Normal   Result Value Ref Range    WBC Count 5.9 4.0 - 11.0 10e3/uL    RBC Count 4.50 3.80 - 5.20 10e6/uL    Hemoglobin 13.4 11.7 - 15.7 g/dL    Hematocrit 40.8 35.0 - 47.0 %    MCV 91 78 - 100 fL    MCH 29.8 26.5 - 33.0 pg    MCHC 32.8 31.5 - 36.5 g/dL    RDW 13.0 10.0 - 15.0 %    Platelet Count 218 150 - 450 10e3/uL       ASSESSMENT / IMPRESSION:  Tick Bite    PLAN:  Report back any chills, fever, significant skin rashes, myalgis or other acute symptoms. Otherwise, follow up as needed.   As there is erythema migrans rash will be treated with antibiotic for 10 days and if the Lyme's titer comes up positive will be treated for a longer duration.  Patient does understand if notices any new symptoms such as worsening fever chills neck stiffness she would follow-up.    .SUBJECTIVE:  Beata Joseph is a 32 year old female who is here because of a tick bite.   The tick was not noticed on the thigh 4 days she thinks it is a tick bite.  Over the last few days she noticed a bull's-eye lesion which has grown very bigger patient's dog sleeps in her bed and does have ticks   Previous antibiotic(s) given no    Associated symptoms:  Fever: no noted  fevers  Maximum temperature   Other symptoms:   Headache no  Sore throat no  Fatigue yes  Nausea/vomiting no  Muscle aches no  Joint pain no  Swollen lymph glands no  Stiff neck no  ROS:  10 point ROS of systems including Constitutional, Eyes, Respiratory, Cardiovascular, Gastroenterology, Genitourinary, Integumentary, Muscularskeletal, Psychiatric were all negative except for pertinent positives noted in my HPI           OBJECTIVE:  Nontoxic female in no acute distress.  Blood pressure 139/87, pulse 83, temperature 98.1  F (36.7  C), temperature source Tympanic, weight 78 kg (172 lb), SpO2 98 %, currently breastfeeding.  Rash description:     Location: thigh     Distribution: 7 cm area of erythema which resembles a bull's-eye lesion noted in the left upper anterior thigh area     lesion grouping: circular     Color: red     Maximum diameter  7cm.    PERTINENT EXAM: GENERAL healthy, alert and no distress  NECK: NEGATIVE  RESP: Clear to auscultation  CV: normal bowel sounds   PSYCH: NEGATIVE        Jovanna Tsang MD

## 2023-07-19 LAB — B BURGDOR IGG+IGM SER QL: 0.05

## 2023-07-27 ENCOUNTER — VIRTUAL VISIT (OUTPATIENT)
Dept: FAMILY MEDICINE | Facility: CLINIC | Age: 33
End: 2023-07-27
Payer: COMMERCIAL

## 2023-07-27 DIAGNOSIS — K59.03 DRUG-INDUCED CONSTIPATION: ICD-10-CM

## 2023-07-27 DIAGNOSIS — I10 BENIGN ESSENTIAL HYPERTENSION: Primary | ICD-10-CM

## 2023-07-27 DIAGNOSIS — F41.9 ANXIETY: ICD-10-CM

## 2023-07-27 DIAGNOSIS — A69.20 ERYTHEMA MIGRANS (LYME DISEASE): ICD-10-CM

## 2023-07-27 PROCEDURE — 99213 OFFICE O/P EST LOW 20 MIN: CPT | Mod: VID | Performed by: FAMILY MEDICINE

## 2023-07-27 PROCEDURE — 96127 BRIEF EMOTIONAL/BEHAV ASSMT: CPT | Mod: VID | Performed by: FAMILY MEDICINE

## 2023-07-27 RX ORDER — SENNA AND DOCUSATE SODIUM 50; 8.6 MG/1; MG/1
1 TABLET, FILM COATED ORAL AT BEDTIME
Qty: 90 TABLET | Refills: 3 | Status: SHIPPED | OUTPATIENT
Start: 2023-07-27 | End: 2024-03-14

## 2023-07-27 RX ORDER — ESCITALOPRAM OXALATE 20 MG/1
20 TABLET ORAL DAILY
Qty: 90 TABLET | Refills: 3 | Status: SHIPPED | OUTPATIENT
Start: 2023-07-27 | End: 2023-11-13

## 2023-07-27 RX ORDER — NIFEDIPINE 30 MG/1
30 TABLET, EXTENDED RELEASE ORAL DAILY
Qty: 90 TABLET | Refills: 3 | Status: SHIPPED | OUTPATIENT
Start: 2023-07-27 | End: 2024-03-14

## 2023-07-27 ASSESSMENT — ANXIETY QUESTIONNAIRES
4. TROUBLE RELAXING: SEVERAL DAYS
3. WORRYING TOO MUCH ABOUT DIFFERENT THINGS: SEVERAL DAYS
5. BEING SO RESTLESS THAT IT IS HARD TO SIT STILL: NOT AT ALL
6. BECOMING EASILY ANNOYED OR IRRITABLE: SEVERAL DAYS
IF YOU CHECKED OFF ANY PROBLEMS ON THIS QUESTIONNAIRE, HOW DIFFICULT HAVE THESE PROBLEMS MADE IT FOR YOU TO DO YOUR WORK, TAKE CARE OF THINGS AT HOME, OR GET ALONG WITH OTHER PEOPLE: SOMEWHAT DIFFICULT
7. FEELING AFRAID AS IF SOMETHING AWFUL MIGHT HAPPEN: SEVERAL DAYS
GAD7 TOTAL SCORE: 6
GAD7 TOTAL SCORE: 6
2. NOT BEING ABLE TO STOP OR CONTROL WORRYING: SEVERAL DAYS
1. FEELING NERVOUS, ANXIOUS, OR ON EDGE: SEVERAL DAYS

## 2023-07-27 ASSESSMENT — PATIENT HEALTH QUESTIONNAIRE - PHQ9
10. IF YOU CHECKED OFF ANY PROBLEMS, HOW DIFFICULT HAVE THESE PROBLEMS MADE IT FOR YOU TO DO YOUR WORK, TAKE CARE OF THINGS AT HOME, OR GET ALONG WITH OTHER PEOPLE: NOT DIFFICULT AT ALL
SUM OF ALL RESPONSES TO PHQ QUESTIONS 1-9: 3
SUM OF ALL RESPONSES TO PHQ QUESTIONS 1-9: 3

## 2023-07-27 NOTE — PROGRESS NOTES
Beata is a 32 year old who is being evaluated via a billable video visit.      How would you like to obtain your AVS? MyChart  If the video visit is dropped, the invitation should be resent by: Text to cell phone: [phone]  Will anyone else be joining your video visit? No          Assessment & Plan   1. Erythema migrans rash was noted   Comment: seen in  for rash- diagnosed with EM-Finishing doxy- prescribed since 7/18  Discussed lyme's disease signs- fever,joints swelling or arthralgia- no concerns   2. Benign essential hypertension  Well controlled.home machine.  Refill given  - NIFEdipine ER OSMOTIC (PROCARDIA XL) 30 MG 24 hr tablet; Take 1 tablet (30 mg) by mouth daily  Dispense: 90 tablet; Refill: 3    3. Drug-induced constipation  Occasional constipation since on nifedipine .  Use stool softener once or twice a week as as needed  only   - SENNA-docusate sodium (SENNA S) 8.6-50 MG tablet; Take 1 tablet by mouth At Bedtime  Dispense: 90 tablet; Refill: 3    4. Anxiety  Stable. On medications, and does not want to change any more dose or medications  Self care with counseling and excercise encouraged.  - escitalopram (LEXAPRO) 20 MG tablet; Take 1 tablet (20 mg) by mouth daily  Dispense: 90 tablet; Refill: 3      4/6/2023     9:50 AM 5/11/2023    11:00 AM 7/27/2023     6:17 AM   ANNEMARIE-7 SCORE   Total Score   6 (mild anxiety)   Total Score 10 11 6      Potential medication side effects were discussed with the patient; let me know if any occur.      Prescription drug management   Time spent by me doing chart review, history and exam, documentation and further activities per the note           Serina Munguia MD  [facility]    Subjective   Beata is a 32 year old, presenting for the following health issues:  Hypertension      History of Present Illness       Hypertension: She presents for follow up of hypertension.  She does check blood pressure  regularly outside of the clinic. Outpatient  "blood pressures have not been over 140/90. She follows a low salt diet.     She eats 2-3 servings of fruits and vegetables daily.She consumes 0 sweetened beverage(s) daily.She exercises with enough effort to increase her heart rate 30 to 60 minutes per day.  She exercises with enough effort to increase her heart rate 3 or less days per week.   She is taking medications regularly.   1. Seen in - Finishing doxy- prescribed since 7/18  2. Hypertension - stable , need refill on nifedipine ,Most recent Blood pressure at home are perfect 110's/70-80  3.anxiety- stable- \"as best as it can be- still worries, new mom of infant. Does not want additional medications or changes in current medications -lexparo 20 mg once daily  once daily   she denies suicidal thoughts or ideation.reports no side effects from medications. Would like to continue.  4. Constipation on and off- because of nifedipine.  Senna works, wants refills             Review of Systems   Constitutional, HEENT, cardiovascular, pulmonary, GI, , musculoskeletal, neuro, skin, endocrine and psych systems are negative, except as otherwise noted.      Objective           Vitals:  No vitals were obtained today due to virtual visit.    Physical Exam   GENERAL: Healthy, alert and no distress  EYES: Eyes grossly normal to inspection.  No discharge or erythema, or obvious scleral/conjunctival abnormalities.  RESP: No audible wheeze, cough, or visible cyanosis.  No visible retractions or increased work of breathing.    SKIN: Visible skin clear. No significant rash, abnormal pigmentation or lesions.  NEURO: Cranial nerves grossly intact.  Mentation and speech appropriate for age.  PSYCH: Mentation appears normal, affect normal/bright, judgement and insight intact, normal speech and appearance well-groomed.                Video-Visit Details    Type of service:  Video Visit     Originating Location (pt. Location): Home    Distant Location (provider location):  " On-site  Platform used for Video Visit: Juan Manuel

## 2023-07-29 ENCOUNTER — HEALTH MAINTENANCE LETTER (OUTPATIENT)
Age: 33
End: 2023-07-29

## 2023-08-11 ENCOUNTER — OFFICE VISIT (OUTPATIENT)
Dept: URGENT CARE | Facility: URGENT CARE | Age: 33
End: 2023-08-11
Payer: COMMERCIAL

## 2023-08-11 VITALS
BODY MASS INDEX: 25.55 KG/M2 | HEART RATE: 70 BPM | WEIGHT: 173 LBS | RESPIRATION RATE: 18 BRPM | OXYGEN SATURATION: 100 % | SYSTOLIC BLOOD PRESSURE: 134 MMHG | DIASTOLIC BLOOD PRESSURE: 85 MMHG | TEMPERATURE: 98.2 F

## 2023-08-11 DIAGNOSIS — N61.0 MASTITIS, RIGHT, ACUTE: Primary | ICD-10-CM

## 2023-08-11 PROCEDURE — 99213 OFFICE O/P EST LOW 20 MIN: CPT | Performed by: NURSE PRACTITIONER

## 2023-08-11 RX ORDER — DICLOXACILLIN SODIUM 500 MG
500 CAPSULE ORAL 4 TIMES DAILY
Qty: 40 CAPSULE | Refills: 0 | Status: SHIPPED | OUTPATIENT
Start: 2023-08-11 | End: 2023-08-21

## 2023-08-11 NOTE — PROGRESS NOTES
Chief Complaint   Patient presents with    Urgent Care    Breast Problem     Rt breast mastitis x2 days. Pt is breast feeding.          ICD-10-CM    1. Mastitis, right, acute  N61.0 dicloxacillin (DYNAPEN) 500 MG capsule      She is instructed to continue pumping, drink extra water and take antibiotics until gone.  If symptoms continue to worsen or fail to improve she is instructed to return for recheck.  Warm compresses, Tylenol as needed for comfort.      Subjective     Beata Joseph is an 32 year old female who presents to clinic today for right breast tenderness and erythema for 2 days.  Patient is breast-feeding and in the process of weaning.  She denies fever, chills, abnormal drainage from the nipple.      Objective    /85   Pulse 70   Temp 98.2  F (36.8  C) (Temporal)   Resp 18   Wt 78.5 kg (173 lb)   SpO2 100%   BMI 25.55 kg/m    Nurses notes and VS have been reviewed.    Physical Exam       GENERAL APPEARANCE: healthy appearing, alert     MS: extremities normal- no gross deformities noted; normal muscle tone.     Breast exam: Left breast appears normal, right breast has swelling and large area of erythema from the noon to 3 o'clock position, very tender to the touch, nipple appears normal and there is no drainage.      ALEJO Patricio, CNP  [facility] Provider    The use of Dragon/[employer] dictation services may have been used to construct the content in this note; any grammatical or spelling errors are non-intentional. Please contact the author of this note directly if you are in need of any clarification.

## 2023-11-11 DIAGNOSIS — F41.9 ANXIETY: ICD-10-CM

## 2023-11-13 ENCOUNTER — MYC REFILL (OUTPATIENT)
Dept: FAMILY MEDICINE | Facility: CLINIC | Age: 33
End: 2023-11-13
Payer: COMMERCIAL

## 2023-11-13 ENCOUNTER — MYC MEDICAL ADVICE (OUTPATIENT)
Dept: FAMILY MEDICINE | Facility: CLINIC | Age: 33
End: 2023-11-13
Payer: COMMERCIAL

## 2023-11-13 DIAGNOSIS — F41.9 ANXIETY: ICD-10-CM

## 2023-11-13 RX ORDER — ESCITALOPRAM OXALATE 20 MG/1
20 TABLET ORAL DAILY
Qty: 30 TABLET | Refills: 11 | OUTPATIENT
Start: 2023-11-13

## 2023-11-13 RX ORDER — ESCITALOPRAM OXALATE 20 MG/1
20 TABLET ORAL DAILY
Qty: 90 TABLET | Refills: 3 | OUTPATIENT
Start: 2023-11-13

## 2023-11-14 RX ORDER — ESCITALOPRAM OXALATE 20 MG/1
20 TABLET ORAL DAILY
Qty: 90 TABLET | Refills: 1 | Status: SHIPPED | OUTPATIENT
Start: 2023-11-14 | End: 2024-03-14

## 2023-11-30 ENCOUNTER — OFFICE VISIT (OUTPATIENT)
Dept: URGENT CARE | Facility: URGENT CARE | Age: 33
End: 2023-11-30
Payer: COMMERCIAL

## 2023-11-30 VITALS
OXYGEN SATURATION: 99 % | DIASTOLIC BLOOD PRESSURE: 88 MMHG | TEMPERATURE: 98.8 F | BODY MASS INDEX: 25.92 KG/M2 | HEIGHT: 69 IN | SYSTOLIC BLOOD PRESSURE: 144 MMHG | HEART RATE: 79 BPM | WEIGHT: 175 LBS

## 2023-11-30 DIAGNOSIS — N30.00 ACUTE CYSTITIS WITHOUT HEMATURIA: Primary | ICD-10-CM

## 2023-11-30 DIAGNOSIS — R30.0 DYSURIA: ICD-10-CM

## 2023-11-30 LAB
ALBUMIN UR-MCNC: NEGATIVE MG/DL
APPEARANCE UR: CLEAR
BACTERIA #/AREA URNS HPF: ABNORMAL /HPF
BILIRUB UR QL STRIP: NEGATIVE
CLUE CELLS: ABNORMAL
COLOR UR AUTO: YELLOW
GLUCOSE UR STRIP-MCNC: NEGATIVE MG/DL
HGB UR QL STRIP: NEGATIVE
KETONES UR STRIP-MCNC: NEGATIVE MG/DL
LEUKOCYTE ESTERASE UR QL STRIP: ABNORMAL
NITRATE UR QL: NEGATIVE
PH UR STRIP: 7 [PH] (ref 5–7)
RBC #/AREA URNS AUTO: ABNORMAL /HPF
SP GR UR STRIP: 1.02 (ref 1–1.03)
SQUAMOUS #/AREA URNS AUTO: ABNORMAL /LPF
TRICHOMONAS, WET PREP: ABNORMAL
UROBILINOGEN UR STRIP-ACNC: 0.2 E.U./DL
WBC #/AREA URNS AUTO: ABNORMAL /HPF
WBC'S/HIGH POWER FIELD, WET PREP: ABNORMAL
YEAST, WET PREP: ABNORMAL

## 2023-11-30 PROCEDURE — 87210 SMEAR WET MOUNT SALINE/INK: CPT

## 2023-11-30 PROCEDURE — 99213 OFFICE O/P EST LOW 20 MIN: CPT | Performed by: PHYSICIAN ASSISTANT

## 2023-11-30 PROCEDURE — 81001 URINALYSIS AUTO W/SCOPE: CPT | Performed by: PHYSICIAN ASSISTANT

## 2023-11-30 RX ORDER — NITROFURANTOIN 25; 75 MG/1; MG/1
100 CAPSULE ORAL 2 TIMES DAILY
Qty: 14 CAPSULE | Refills: 0 | Status: SHIPPED | OUTPATIENT
Start: 2023-11-30 | End: 2023-12-07

## 2023-11-30 NOTE — PROGRESS NOTES
Dysuria  - UA Macroscopic with reflex to Microscopic and Culture - Clinic Collect  - Wet prep - Clinic Collect  - UA Microscopic with Reflex to Culture  - nitroFURantoin macrocrystal-monohydrate (MACROBID) 100 MG capsule; Take 1 capsule (100 mg) by mouth 2 times daily for 7 days    Acute cystitis without hematuria  - nitroFURantoin macrocrystal-monohydrate (MACROBID) 100 MG capsule; Take 1 capsule (100 mg) by mouth 2 times daily for 7 days    Patient should drink 1.5-2 liters of water per day. Okay to use Azo over the counter and/or cranberry juice for symptomatic relief (Note that Azo will cause urine to become orange/red. If you are a contact lens-wearer, contacts may become discolored.This is normal). Okay to continue taking prescribed antibiotic for full course. Patient was advised to return to clinic if symptoms do not improve in the amount of time specified in the AVS or if symptoms worsen. Patient educated on red flag symptoms and asked to go directly to the ED if symptoms present themselves.      Patient given handout on how to prevent UTIs in the future.    Patient was advised to return to clinic for reevaluation (either UC or PCP) if symptoms do not improve in 7 days. Patient educated on red flag symptoms and asked to go directly to the ED if these symptoms present themselves.       Antonio Mendoza PA-C  [facility]    Subjective   33 year old who presents to clinic today for the following health issues:    Urgent Care and Urinary Problem       HPI     Genitourinary - Female  Onset/Duration: 2 days   Description:   Painful urination (Dysuria): YES           Frequency: YES  Blood in urine (Hematuria): No  Delay in urine (Hesitency): YES  Intensity: mild  Progression of Symptoms:  worsening  Accompanying Signs & Symptoms:  Fever/chills: No  Flank pain: No  Nausea and vomiting: No  Vaginal symptoms: none  Abdominal/Pelvic Pain: YES  History:   History of frequent UTI s: YES  History of  kidney stones: No  Precipitating or alleviating factors: Sex with  yesterday and not peeing right away.    Therapies tried and outcome: None     Review of Systems   Review of Systems   See HPI    Objective    Temp: 98.8  F (37.1  C) Temp src: Temporal BP: (!) 144/88 Pulse: 79     SpO2: 99 %       Physical Exam   Physical Exam  Constitutional:       General: She is not in acute distress.     Appearance: Normal appearance. She is normal weight. She is not ill-appearing, toxic-appearing or diaphoretic.   HENT:      Head: Normocephalic and atraumatic.   Cardiovascular:      Rate and Rhythm: Normal rate.      Pulses: Normal pulses.   Pulmonary:      Effort: Pulmonary effort is normal. No respiratory distress.   Abdominal:      Tenderness: There is no right CVA tenderness or left CVA tenderness.   Neurological:      General: No focal deficit present.      Mental Status: She is alert and oriented to person, place, and time. Mental status is at baseline.      Gait: Gait normal.   Psychiatric:         Mood and Affect: Mood normal.         Behavior: Behavior normal.         Thought Content: Thought content normal.         Judgment: Judgment normal.          Results for orders placed or performed in visit on 11/30/23 (from the past 24 hour(s))   UA Macroscopic with reflex to Microscopic and Culture - Clinic Collect    Specimen: Urine, Clean Catch   Result Value Ref Range    Color Urine Yellow Colorless, Straw, Light Yellow, Yellow    Appearance Urine Clear Clear    Glucose Urine Negative Negative mg/dL    Bilirubin Urine Negative Negative    Ketones Urine Negative Negative mg/dL    Specific Gravity Urine 1.020 1.003 - 1.035    Blood Urine Negative Negative    pH Urine 7.0 5.0 - 7.0    Protein Albumin Urine Negative Negative mg/dL    Urobilinogen Urine 0.2 0.2, 1.0 E.U./dL    Nitrite Urine Negative Negative    Leukocyte Esterase Urine Trace (A) Negative   Wet prep - Clinic Collect    Specimen: Vagina; Swab   Result Value  Ref Range    Trichomonas Absent Absent    Yeast Absent Absent    Clue Cells Absent Absent    WBCs/high power field 1+ (A) None   UA Microscopic with Reflex to Culture   Result Value Ref Range    Bacteria Urine Moderate (A) None Seen /HPF    RBC Urine None Seen 0-2 /HPF /HPF    WBC Urine None Seen 0-5 /HPF /HPF    Squamous Epithelials Urine Few (A) None Seen /LPF    Narrative    Urine Culture not indicated

## 2024-03-10 DIAGNOSIS — Z30.09 FAMILY PLANNING: ICD-10-CM

## 2024-03-11 RX ORDER — NORETHINDRONE 0.35 MG/1
0.35 TABLET ORAL DAILY
Qty: 84 TABLET | Refills: 0 | Status: SHIPPED | OUTPATIENT
Start: 2024-03-11 | End: 2024-03-14

## 2024-03-12 SDOH — HEALTH STABILITY: PHYSICAL HEALTH: ON AVERAGE, HOW MANY DAYS PER WEEK DO YOU ENGAGE IN MODERATE TO STRENUOUS EXERCISE (LIKE A BRISK WALK)?: 3 DAYS

## 2024-03-12 SDOH — HEALTH STABILITY: PHYSICAL HEALTH: ON AVERAGE, HOW MANY MINUTES DO YOU ENGAGE IN EXERCISE AT THIS LEVEL?: 40 MIN

## 2024-03-12 ASSESSMENT — SOCIAL DETERMINANTS OF HEALTH (SDOH): HOW OFTEN DO YOU GET TOGETHER WITH FRIENDS OR RELATIVES?: ONCE A WEEK

## 2024-03-14 ENCOUNTER — OFFICE VISIT (OUTPATIENT)
Dept: FAMILY MEDICINE | Facility: CLINIC | Age: 34
End: 2024-03-14
Payer: COMMERCIAL

## 2024-03-14 VITALS
BODY MASS INDEX: 28.51 KG/M2 | DIASTOLIC BLOOD PRESSURE: 80 MMHG | SYSTOLIC BLOOD PRESSURE: 132 MMHG | RESPIRATION RATE: 16 BRPM | HEART RATE: 101 BPM | OXYGEN SATURATION: 99 % | TEMPERATURE: 98.2 F | HEIGHT: 69 IN | WEIGHT: 192.5 LBS

## 2024-03-14 DIAGNOSIS — Z13.220 SCREENING FOR LIPID DISORDERS: ICD-10-CM

## 2024-03-14 DIAGNOSIS — F33.0 MAJOR DEPRESSIVE DISORDER, RECURRENT EPISODE, MILD (H): ICD-10-CM

## 2024-03-14 DIAGNOSIS — Z30.09 FAMILY PLANNING: ICD-10-CM

## 2024-03-14 DIAGNOSIS — K21.9 GASTROESOPHAGEAL REFLUX DISEASE WITHOUT ESOPHAGITIS: ICD-10-CM

## 2024-03-14 DIAGNOSIS — Z00.00 ROUTINE GENERAL MEDICAL EXAMINATION AT A HEALTH CARE FACILITY: Primary | ICD-10-CM

## 2024-03-14 DIAGNOSIS — Z83.49 FAMILY HISTORY OF HYPOTHYROIDISM: ICD-10-CM

## 2024-03-14 DIAGNOSIS — K59.03 DRUG-INDUCED CONSTIPATION: ICD-10-CM

## 2024-03-14 DIAGNOSIS — F41.9 ANXIETY: ICD-10-CM

## 2024-03-14 DIAGNOSIS — I10 BENIGN ESSENTIAL HYPERTENSION: ICD-10-CM

## 2024-03-14 LAB
ALBUMIN SERPL BCG-MCNC: 4.3 G/DL (ref 3.5–5.2)
ALP SERPL-CCNC: 51 U/L (ref 40–150)
ALT SERPL W P-5'-P-CCNC: 9 U/L (ref 0–50)
ANION GAP SERPL CALCULATED.3IONS-SCNC: 11 MMOL/L (ref 7–15)
AST SERPL W P-5'-P-CCNC: 19 U/L (ref 0–45)
BILIRUB SERPL-MCNC: 0.4 MG/DL
BUN SERPL-MCNC: 8.5 MG/DL (ref 6–20)
CALCIUM SERPL-MCNC: 9.3 MG/DL (ref 8.6–10)
CHLORIDE SERPL-SCNC: 105 MMOL/L (ref 98–107)
CHOLEST SERPL-MCNC: 203 MG/DL
CREAT SERPL-MCNC: 0.62 MG/DL (ref 0.51–0.95)
DEPRECATED HCO3 PLAS-SCNC: 22 MMOL/L (ref 22–29)
EGFRCR SERPLBLD CKD-EPI 2021: >90 ML/MIN/1.73M2
FASTING STATUS PATIENT QL REPORTED: YES
GLUCOSE SERPL-MCNC: 109 MG/DL (ref 70–99)
HDLC SERPL-MCNC: 54 MG/DL
LDLC SERPL CALC-MCNC: 133 MG/DL
NONHDLC SERPL-MCNC: 149 MG/DL
POTASSIUM SERPL-SCNC: 3.7 MMOL/L (ref 3.4–5.3)
PROT SERPL-MCNC: 6.8 G/DL (ref 6.4–8.3)
SODIUM SERPL-SCNC: 138 MMOL/L (ref 135–145)
TRIGL SERPL-MCNC: 79 MG/DL
TSH SERPL DL<=0.005 MIU/L-ACNC: 2.37 UIU/ML (ref 0.3–4.2)

## 2024-03-14 PROCEDURE — 90480 ADMN SARSCOV2 VAC 1/ONLY CMP: CPT | Performed by: FAMILY MEDICINE

## 2024-03-14 PROCEDURE — 80061 LIPID PANEL: CPT | Performed by: FAMILY MEDICINE

## 2024-03-14 PROCEDURE — 36415 COLL VENOUS BLD VENIPUNCTURE: CPT | Performed by: FAMILY MEDICINE

## 2024-03-14 PROCEDURE — 91320 SARSCV2 VAC 30MCG TRS-SUC IM: CPT | Performed by: FAMILY MEDICINE

## 2024-03-14 PROCEDURE — 99214 OFFICE O/P EST MOD 30 MIN: CPT | Mod: 25 | Performed by: FAMILY MEDICINE

## 2024-03-14 PROCEDURE — 84443 ASSAY THYROID STIM HORMONE: CPT | Performed by: FAMILY MEDICINE

## 2024-03-14 PROCEDURE — 99395 PREV VISIT EST AGE 18-39: CPT | Mod: 25 | Performed by: FAMILY MEDICINE

## 2024-03-14 PROCEDURE — 96127 BRIEF EMOTIONAL/BEHAV ASSMT: CPT | Performed by: FAMILY MEDICINE

## 2024-03-14 PROCEDURE — 86376 MICROSOMAL ANTIBODY EACH: CPT | Performed by: FAMILY MEDICINE

## 2024-03-14 PROCEDURE — 80053 COMPREHEN METABOLIC PANEL: CPT | Performed by: FAMILY MEDICINE

## 2024-03-14 RX ORDER — ESCITALOPRAM OXALATE 20 MG/1
20 TABLET ORAL DAILY
Qty: 90 TABLET | Refills: 1 | Status: SHIPPED | OUTPATIENT
Start: 2024-03-14 | End: 2024-03-14

## 2024-03-14 RX ORDER — ACETAMINOPHEN AND CODEINE PHOSPHATE 120; 12 MG/5ML; MG/5ML
0.35 SOLUTION ORAL DAILY
Qty: 84 TABLET | Refills: 0 | Status: SHIPPED | OUTPATIENT
Start: 2024-03-14 | End: 2024-03-14

## 2024-03-14 RX ORDER — SENNA AND DOCUSATE SODIUM 50; 8.6 MG/1; MG/1
1 TABLET, FILM COATED ORAL AT BEDTIME
Qty: 90 TABLET | Refills: 3 | Status: SHIPPED | OUTPATIENT
Start: 2024-03-14

## 2024-03-14 RX ORDER — BUPROPION HYDROCHLORIDE 150 MG/1
150 TABLET ORAL EVERY MORNING
Qty: 90 TABLET | Refills: 3 | Status: SHIPPED | OUTPATIENT
Start: 2024-03-14

## 2024-03-14 RX ORDER — NIFEDIPINE 30 MG/1
30 TABLET, EXTENDED RELEASE ORAL DAILY
Qty: 90 TABLET | Refills: 3 | Status: SHIPPED | OUTPATIENT
Start: 2024-03-14

## 2024-03-14 RX ORDER — ACETAMINOPHEN AND CODEINE PHOSPHATE 120; 12 MG/5ML; MG/5ML
0.35 SOLUTION ORAL DAILY
Qty: 84 TABLET | Refills: 3 | Status: SHIPPED | OUTPATIENT
Start: 2024-03-14 | End: 2024-06-03

## 2024-03-14 RX ORDER — ESCITALOPRAM OXALATE 20 MG/1
20 TABLET ORAL DAILY
Qty: 90 TABLET | Refills: 3 | Status: SHIPPED | OUTPATIENT
Start: 2024-03-14

## 2024-03-14 ASSESSMENT — PATIENT HEALTH QUESTIONNAIRE - PHQ9
SUM OF ALL RESPONSES TO PHQ QUESTIONS 1-9: 8
10. IF YOU CHECKED OFF ANY PROBLEMS, HOW DIFFICULT HAVE THESE PROBLEMS MADE IT FOR YOU TO DO YOUR WORK, TAKE CARE OF THINGS AT HOME, OR GET ALONG WITH OTHER PEOPLE: SOMEWHAT DIFFICULT
SUM OF ALL RESPONSES TO PHQ QUESTIONS 1-9: 8

## 2024-03-14 ASSESSMENT — PAIN SCALES - GENERAL: PAINLEVEL: NO PAIN (0)

## 2024-03-14 ASSESSMENT — ANXIETY QUESTIONNAIRES
IF YOU CHECKED OFF ANY PROBLEMS ON THIS QUESTIONNAIRE, HOW DIFFICULT HAVE THESE PROBLEMS MADE IT FOR YOU TO DO YOUR WORK, TAKE CARE OF THINGS AT HOME, OR GET ALONG WITH OTHER PEOPLE: SOMEWHAT DIFFICULT
5. BEING SO RESTLESS THAT IT IS HARD TO SIT STILL: NOT AT ALL
3. WORRYING TOO MUCH ABOUT DIFFERENT THINGS: MORE THAN HALF THE DAYS
6. BECOMING EASILY ANNOYED OR IRRITABLE: MORE THAN HALF THE DAYS
4. TROUBLE RELAXING: SEVERAL DAYS
GAD7 TOTAL SCORE: 11
1. FEELING NERVOUS, ANXIOUS, OR ON EDGE: MORE THAN HALF THE DAYS
GAD7 TOTAL SCORE: 11
GAD7 TOTAL SCORE: 11
7. FEELING AFRAID AS IF SOMETHING AWFUL MIGHT HAPPEN: MORE THAN HALF THE DAYS
2. NOT BEING ABLE TO STOP OR CONTROL WORRYING: MORE THAN HALF THE DAYS
7. FEELING AFRAID AS IF SOMETHING AWFUL MIGHT HAPPEN: MORE THAN HALF THE DAYS
8. IF YOU CHECKED OFF ANY PROBLEMS, HOW DIFFICULT HAVE THESE MADE IT FOR YOU TO DO YOUR WORK, TAKE CARE OF THINGS AT HOME, OR GET ALONG WITH OTHER PEOPLE?: SOMEWHAT DIFFICULT

## 2024-03-14 NOTE — PROGRESS NOTES
Preventive Care Visit  [facility]  Serina Munguia MD, Family Medicine  Mar 14, 2024      Assessment & Plan     Routine general medical examination at a health care facility    1. Routine general medical examination at a [facility] care facility  Pap smear human pappiloma virus negative/ NILM pap 6/2022- repeat q  3yrs- next due in 6/2025    2. Major depressive disorder, recurrent episode, mild (H24)  Plan: add Wellbutrin 150 mg once daily and continue llexapro- combination may help better with both depression nire than anxiety.  Follow up on phone in 4 weeks, earlier if concerns with mood or medications   - buPROPion (WELLBUTRIN XL) 150 MG 24 hr tablet; Take 1 tablet (150 mg) by mouth every morning  Dispense: 90 tablet; Refill: 3      5/11/2023    11:00 AM 7/27/2023     6:16 AM 3/14/2024     6:53 AM   PHQ   PHQ-9 Total Score 3 3 8   Q9: Thoughts of better off dead/self-harm past 2 weeks Not at all Not at all Not at all      3. Anxiety  Plan: continue with counseling and excercise . Refill given and anticipated improvement with adding Wellbutrin as combination   - escitalopram (LEXAPRO) 20 MG tablet; Take 1 tablet (20 mg) by mouth daily  Dispense: 90 tablet; Refill: 3      5/11/2023    11:00 AM 7/27/2023     6:17 AM 3/14/2024     6:53 AM   ANNEMARIE-7 SCORE   Total Score  6 (mild anxiety) 11 (moderate anxiety)   Total Score 11 6 11         4. Benign essential hypertension-controlled   Plan: her own Blood pressure monitor at home shows Blood pressure in target range- even better than in clinic.  - NIFEdipine ER OSMOTIC (PROCARDIA XL) 30 MG 24 hr tablet; Take 1 tablet (30 mg) by mouth daily  Dispense: 90 tablet; Refill: 3  - Comprehensive metabolic panel (BMP + Alb, Alk Phos, ALT, AST, Total. Bili, TP); Future    5. Gastroesophageal reflux disease without esophagitis  Plan: diet changes encouraged to avoid gastroesophageal reflux disease   - omeprazole (PRILOSEC) 20 MG DR capsule; Take 1 capsule (20 mg)  "by mouth daily  Dispense: 90 capsule; Refill: 3    6. Family planning  Plan: due to history of scotoma/ migraine - progesterone only oral contraceptive pills have suited the best. Refill given for a month  - norethindrone (JENCYCLA) 0.35 MG tablet; Take 1 tablet (0.35 mg) by mouth daily  Dispense: 84 tablet; Refill: 3    7. Drug-induced constipation  Plan: due to nifedipine   - SENNA-docusate sodium (SENNA S) 8.6-50 MG tablet; Take 1 tablet by mouth at bedtime  Dispense: 90 tablet; Refill: 3    8. Screening for lipid disorders  Fasting for labs   - Lipid Profile (Chol, Trig, HDL, LDL calc); Future    9. Family history of hypothyroidism  Plan:  - TSH with free T4 reflex; Future  - Thyroid peroxidase antibody; Future      Patient has been advised of split billing requirements and indicates understanding: Yes  Ordering of each unique test  Prescription drug management        BMI  Estimated body mass index is 28.22 kg/m  as calculated from the following:    Height as of this encounter: 1.759 m (5' 9.25\").    Weight as of this encounter: 87.3 kg (192 lb 8 oz).   Weight management plan: Discussed healthy diet and exercise guidelines    Counseling  Appropriate preventive services were discussed with this patient, including applicable screening as appropriate for fall prevention, nutrition, physical activity, Tobacco-use cessation, weight loss and cognition.  Checklist reviewing preventive services available has been given to the patient.  Reviewed patient's diet, addressing concerns and/or questions.   She is at risk for lack of exercise and has been provided with information to increase physical activity for the benefit of her well-being.   The patient's PHQ-9 score is consistent with mild depression. She was provided with information regarding depression.       Work on weight loss  Regular exercise    Yan Cota is a 33 year old, presenting for the following:  Physical        3/14/2024     6:59 AM   Additional " Questions   Roomed by [facility]        Health Care Directive  Patient does not have a Health Care Directive or Living Will: Discussed advance care planning with patient; however, patient declined at this time.    HPI  Answers submitted by the patient for this visit:  Patient Health Questionnaire (Submitted on 3/14/2024)  If you checked off any problems, how difficult have these problems made it for you to do your work, take care of things at home, or get along with other people?: Somewhat difficult  PHQ9 TOTAL SCORE: 8  ANNEMARIE-7 (Submitted on 3/14/2024)  ANNEMARIE 7 TOTAL SCORE: 11        3/12/2024   General Health   How would you rate your overall physical health? Good   Feel stress (tense, anxious, or unable to sleep) Rather much   (!) STRESS CONCERN      3/12/2024   Nutrition   Three or more servings of calcium each day? Yes   Diet: Regular (no restrictions)   How many servings of fruit and vegetables per day? (!) 2-3   How many sweetened beverages each day? 0-1         3/12/2024   Exercise   Days per week of moderate/strenous exercise 3 days   Average minutes spent exercising at this level 40 min         3/12/2024   Social Factors   Frequency of gathering with friends or relatives Once a week   Worry food won't last until get money to buy more No   Food not last or not have enough money for food? No   Do you have housing?  Yes   Are you worried about losing your housing? No   Lack of transportation? No   Unable to get utilities (heat,electricity)? No         3/12/2024   Dental   Dentist two times every year? Yes         3/12/2024   TB Screening   Were you born outside of [place]?  No       Today's PHQ-9 Score:       3/14/2024     6:53 AM   PHQ-9 SCORE   PHQ-9 Total Score MyChart 8 (Mild depression)   PHQ-9 Total Score 8         3/12/2024   Substance Use   Alcohol more than 3/day or more than 7/wk No   Do you use any other substances recreationally? (!) ALCOHOL     Social History     Tobacco Use    Smoking status: Never      "Passive exposure: Never    Smokeless tobacco: Never   Vaping Use    Vaping Use: Never used   Substance Use Topics    Alcohol use: Yes    Drug use: No             3/12/2024   Breast Cancer Screening   Family history of breast, colon, or ovarian cancer? No / Unknown      Mammogram Screening - Patient under 40 years of age: Routine Mammogram Screening not recommended.         3/12/2024   STI Screening   New sexual partner(s) since last STI/HIV test? No     History of abnormal Pap smear: NO - age 30- 65 PAP every 3 years recommended        Latest Ref Rng & Units 6/13/2022     1:18 PM 6/9/2021    10:59 AM 7/15/2020     1:21 PM   PAP / HPV   PAP  Negative for Intraepithelial Lesion or Malignancy (NILM)      PAP (Historical)   ASC-US  NIL    HPV 16 DNA Negative Negative  Negative  Negative    HPV 18 DNA Negative Negative  Negative  Negative    Other HR HPV Negative Negative  Positive  Positive            3/12/2024   Contraception/Family Planning   Questions about contraception or family planning (!) YES         Reviewed and updated as needed this visit by Provider   Tobacco  Allergies  Meds  Problems  Med Hx  Surg Hx  Fam Hx            Wt Readings from Last 5 Encounters:   03/14/24 87.3 kg (192 lb 8 oz)   11/30/23 79.4 kg (175 lb)   08/11/23 78.5 kg (173 lb)   07/18/23 78 kg (172 lb)   05/11/23 78.1 kg (172 lb 3.2 oz)          Review of Systems  Constitutional, neuro, ENT, endocrine, pulmonary, cardiac, gastrointestinal, genitourinary, musculoskeletal, integument and psychiatric systems are negative, except as otherwise noted.     Objective    Exam  /80   Pulse 101   Temp 98.2  F (36.8  C) (Temporal)   Resp 16   Ht 1.759 m (5' 9.25\")   Wt 87.3 kg (192 lb 8 oz)   LMP 02/28/2024   SpO2 99%   Breastfeeding No   BMI 28.22 kg/m     Estimated body mass index is 28.22 kg/m  as calculated from the following:    Height as of this encounter: 1.759 m (5' 9.25\").    Weight as of this encounter: 87.3 kg (192 lb 8 " brittanie).    Physical Exam          Signed Electronically by: Serina Munguia MD

## 2024-03-14 NOTE — PATIENT INSTRUCTIONS
Anthony Middleton MD [facility] Card Nurse Message Pool  Looks Ok  Stable results  Please notify patient    Holter Monitor 1/25/2024  CONCLUSIONS:  1. This was a 7-day Holter monitor performed for palpitations, with underlying sinus rhythm and average heart rate 75 bpm.  2. Four patient-reported symptomatic transmissions, demonstrating sinus rhythm or sinus tachycardia, heart rate  bpm.  3. Mild burden of ventricular ectopy, represented predominantly as isolated PVCs. No significant ventricular arrhythmia, and no significant supraventricular ectopy was identified. 27 asymptomatic bouts of SVT, with the longest being 20 beats.  4. No significant pauses (>3 seconds) and no atrial fibrillation were detected.    Discussed results with patient who verbalized understanding.  Pt states she had Life Screening done, she will be dropping off test results at the [facility] office in the next few weeks once final results are viewable.  No further questions at this time.        Preventive Care Advice   This is general advice given by our system to help you stay healthy. However, your care team may have specific advice just for you. Please talk to your care team about your preventive care needs.  Nutrition  Eat 5 or more servings of fruits and vegetables each day.  Try wheat bread, brown rice and whole grain pasta (instead of white bread, rice, and pasta).  Get enough calcium and vitamin D. Check the label on foods and aim for 100% of the RDA (recommended daily allowance).  Lifestyle  Exercise at least 150 minutes each week   (30 minutes a day, 5 days a week).  Do muscle strengthening activities 2 days a week. These help control your weight and prevent disease.  No smoking.  Wear sunscreen to prevent skin cancer.  Have a dental exam and cleaning every 6 months.  Yearly exams  See your health care team every year to talk about:  Any changes in your health.  Any medicines your care team has prescribed.  Preventive care, family planning, and ways to prevent chronic diseases.  Shots (vaccines)   HPV shots (up to age 26), if you've never had them before.  Hepatitis B shots (up to age 59), if you've never had them before.  COVID-19 shot: Get this shot when it's due.  Flu shot: Get a flu shot every year.  Tetanus shot: Get a tetanus shot every 10 years.  Pneumococcal, hepatitis A, and RSV shots: Ask your care team if you need these based on your risk.  Shingles shot (for age 50 and up).  General health tests  Diabetes screening:  Starting at age 35, Get screened for diabetes at least every 3 years.  If you are younger than age 35, ask your care team if you should be screened for diabetes.  Cholesterol test: At age 39, start having a cholesterol test every 5 years, or more often if advised.  Bone density scan (DEXA): At age 50, ask your care team if you should have this scan for osteoporosis (brittle bones).  Hepatitis C: Get tested at least once in your life.  STIs (sexually transmitted  infections)  Before age 24: Ask your care team if you should be screened for STIs.  After age 24: Get screened for STIs if you're at risk. You are at risk for STIs (including HIV) if:  You are sexually active with more than one person.  You don't use condoms every time.  You or a partner was diagnosed with a sexually transmitted infection.  If you are at risk for HIV, ask about PrEP medicine to prevent HIV.  Get tested for HIV at least once in your life, whether you are at risk for HIV or not.  Cancer screening tests  Cervical cancer screening: If you have a cervix, begin getting regular cervical cancer screening tests at age 21. Most people who have regular screenings with normal results can stop after age 65. Talk about this with your provider.  Breast cancer scan (mammogram): If you've ever had breasts, begin having regular mammograms starting at age 40. This is a scan to check for breast cancer.  Colon cancer screening: It is important to start screening for colon cancer at age 45.  Have a colonoscopy test every 10 years (or more often if you're at risk) Or, ask your provider about stool tests like a FIT test every year or Cologuard test every 3 years.  To learn more about your testing options, visit: [email or web address].  For help making a decision, visit: https://bit.ly/ik93493.  Prostate cancer screening test: If you have a prostate and are age 55 to 69, ask your provider if you would benefit from a yearly prostate cancer screening test.  Lung cancer screening: If you are a current or former smoker age 50 to 80, ask your care team if ongoing lung cancer screenings are right for you.  For informational purposes only. Not to replace the advice of your health care provider. Copyright   2023 [employer]. All rights reserved. Clinically reviewed by the [facility]. Capstory 976990 - REV 01/24.    Learning About Stress  What is stress?     Stress is your  body's response to a hard situation. Your body can have a physical, emotional, or mental response. Stress is a fact of life for most people, and it affects everyone differently. What causes stress for you may not be stressful for someone else.  A lot of things can cause stress. You may feel stress when you go on a job interview, take a test, or run a race. This kind of short-term stress is normal and even useful. It can help you if you need to work hard or react quickly. For example, stress can help you finish an important job on time.  Long-term stress is caused by ongoing stressful situations or events. Examples of long-term stress include long-term health problems, ongoing problems at work, or conflicts in your family. Long-term stress can harm your health.  How does stress affect your health?  When you are stressed, your body responds as though you are in danger. It makes hormones that speed up your heart, make you breathe faster, and give you a burst of energy. This is called the fight-or-flight stress response. If the stress is over quickly, your body goes back to normal and no harm is done.  But if stress happens too often or lasts too long, it can have bad effects. Long-term stress can make you more likely to get sick, and it can make symptoms of some diseases worse. If you tense up when you are stressed, you may develop neck, shoulder, or low back pain. Stress is linked to high blood pressure and heart disease.  Stress also harms your emotional health. It can make you sears, tense, or depressed. Your relationships may suffer, and you may not do well at work or school.  What can you do to manage stress?  You can try these things to help manage stress:   Do something active. Exercise or activity can help reduce stress. Walking is a great way to get started. Even everyday activities such as housecleaning or yard work can help.  Try yoga or linda chi. These techniques combine exercise and meditation. You may need  some training at first to learn them.  Do something you enjoy. For example, listen to music or go to a movie. Practice your hobby or do volunteer work.  Meditate. This can help you relax, because you are not worrying about what happened before or what may happen in the future.  Do guided imagery. Imagine yourself in any setting that helps you feel calm. You can use online videos, books, or a teacher to guide you.  Do breathing exercises. For example:  From a standing position, bend forward from the waist with your knees slightly bent. Let your arms dangle close to the floor.  Breathe in slowly and deeply as you return to a standing position. Roll up slowly and lift your head last.  Hold your breath for just a few seconds in the standing position.  Breathe out slowly and bend forward from the waist.  Let your feelings out. Talk, laugh, cry, and express anger when you need to. Talking with supportive friends or family, a counselor, or a paige leader about your feelings is a healthy way to relieve stress. Avoid discussing your feelings with people who make you feel worse.  Write. It may help to write about things that are bothering you. This helps you find out how much stress you feel and what is causing it. When you know this, you can find better ways to cope.  What can you do to prevent stress?  You might try some of these things to help prevent stress:  Manage your time. This helps you find time to do the things you want and need to do.  Get enough sleep. Your body recovers from the stresses of the day while you are sleeping.  Get support. Your family, friends, and community can make a difference in how you experience stress.  Limit your news feed. Avoid or limit time on social media or news that may make you feel stressed.  Do something active. Exercise or activity can help reduce stress. Walking is a great way to get started.  Where can you learn more?  Go to https://www.healthwise.net/patiented  Enter N032 in the  "search box to learn more about \"Learning About Stress.\"  Current as of: October 24, 2023               Content Version: 14.0    7806-8737 [employer].   Care instructions adapted under license by your healthcare professional. If you have questions about a medical condition or this instruction, always ask your healthcare professional. [employer] disclaims any warranty or liability for your use of this information.      Learning About Depression Screening  What is depression screening?  Depression screening is a way to see if you have depression symptoms. It may be done by a doctor or counselor. It's often part of a routine checkup. That's because your mental health is just as important as your physical health.  Depression is a mental health condition that affects how you feel, think, and act. You may:  Have less energy.  Lose interest in your daily activities.  Feel sad and grouchy for a long time.  Depression is very common. It affects people of all ages.  Many things can lead to depression. Some people become depressed after they have a stroke or find out they have a major illness like cancer or heart disease. The death of a loved one or a breakup may lead to depression. It can run in families. Most experts believe that a combination of inherited genes and stressful life events can cause it.  What happens during screening?  You may be asked to fill out a form about your depression symptoms. You and the doctor will discuss your answers. The doctor may ask you more questions to learn more about how you think, act, and feel.  What happens after screening?  If you have symptoms of depression, your doctor will talk to you about your options.  Doctors usually treat depression with medicines or counseling. Often, combining the two works best. Many people don't get help because they think that they'll get over the depression on their own. But people with depression may not get better unless they " "get treatment.  The cause of depression is not well understood. There may be many factors involved. But if you have depression, it's not your fault.  A serious symptom of depression is thinking about death or suicide. If you or someone you care about talks about this or about feeling hopeless, get help right away.  It's important to know that depression can be treated. Medicine, counseling, and self-care may help.  Where can you learn more?  Go to https://www.FileLife.net/patiented  Enter T185 in the search box to learn more about \"Learning About Depression Screening.\"  Current as of: June 24, 2023               Content Version: 14.0    7882-5981 [employer].   Care instructions adapted under license by your healthcare professional. If you have questions about a medical condition or this instruction, always ask your healthcare professional. [employer] disclaims any warranty or liability for your use of this information.      Substance Use Disorder: Care Instructions  Overview     You can improve your life and health by stopping your use of alcohol or drugs. When you don't drink or use drugs, you may feel and sleep better. You may get along better with your family, friends, and coworkers. There are medicines and programs that can help with substance use disorder.  How can you care for yourself at home?  Here are some ways to help you stay sober and prevent relapse.  If you have been given medicine to help keep you sober or reduce your cravings, be sure to take it exactly as prescribed.  Talk to your doctor about programs that can help you stop using drugs or drinking alcohol.  Do not keep alcohol or drugs in your home.  Plan ahead. Think about what you'll say if other people ask you to drink or use drugs. Try not to spend time with people who drink or use drugs.  Use the time and money spent on drinking or drugs to do something that's important to you.  Preventing a relapse  Have a plan " to deal with relapse. Learn to recognize changes in your thinking that lead you to drink or use drugs. Get help before you start to drink or use drugs again.  Try to stay away from situations, friends, or places that may lead you to drink or use drugs.  If you feel the need to drink alcohol or use drugs again, seek help right away. Call a trusted friend or family member. Some people get support from organizations such as Narcotics Anonymous or [employer] or from treatment facilities.  If you relapse, get help as soon as you can. Some people make a plan with another person that outlines what they want that person to do for them if they relapse. The plan usually includes how to handle the relapse and who to notify in case of relapse.  Don't give up. Remember that a relapse doesn't mean that you have failed. Use the experience to learn the triggers that lead you to drink or use drugs. Then quit again. Recovery is a lifelong process. Many people have several relapses before they are able to quit for good.  Follow-up care is a key part of your treatment and safety. Be sure to make and go to all appointments, and call your doctor if you are having problems. It's also a good idea to know your test results and keep a list of the medicines you take.  When should you call for help?   Call 911  anytime you think you may need emergency care. For example, call if you or someone else:    Has overdosed or has withdrawal signs. Be sure to tell the emergency workers that you are or someone else is using or trying to quit using drugs. Overdose or withdrawal signs may include:  Losing consciousness.  Seizure.  Seeing or hearing things that aren't there (hallucinations).     Is thinking or talking about suicide or harming others.   Where to get help 24 hours a day, 7 days a week   If you or someone you know talks about suicide, self-harm, a mental health crisis, a substance use crisis, or any other kind of emotional distress, get  "help right away. You can:    Call the Suicide and Crisis Lifeline at 988.     Call 7-762-179-WARJ (1-293.956.9478).     Text HOME to 098426 to access the Crisis Text Line.   Consider saving these numbers in your phone.  Go to [employer] for more information or to chat online.  Call your doctor now or seek immediate medical care if:    You are having withdrawal symptoms. These may include nausea or vomiting, sweating, shakiness, and anxiety.   Watch closely for changes in your health, and be sure to contact your doctor if:    You have a relapse.     You need more help or support to stop.   Where can you learn more?  Go to https://www.Quantine.net/patiented  Enter H573 in the search box to learn more about \"Substance Use Disorder: Care Instructions.\"  Current as of: November 15, 2023               Content Version: 14.0    7421-5565 [employer], [employer].   Care instructions adapted under license by your healthcare professional. If you have questions about a medical condition or this instruction, always ask your healthcare professional. Healthwise, [employer] disclaims any warranty or liability for your use of this information.      "

## 2024-03-15 LAB — THYROPEROXIDASE AB SERPL-ACNC: <10 IU/ML

## 2024-04-13 ENCOUNTER — OFFICE VISIT (OUTPATIENT)
Dept: URGENT CARE | Facility: URGENT CARE | Age: 34
End: 2024-04-13
Payer: COMMERCIAL

## 2024-04-13 VITALS
SYSTOLIC BLOOD PRESSURE: 122 MMHG | WEIGHT: 192 LBS | TEMPERATURE: 98.1 F | OXYGEN SATURATION: 97 % | HEART RATE: 83 BPM | RESPIRATION RATE: 19 BRPM | DIASTOLIC BLOOD PRESSURE: 80 MMHG | BODY MASS INDEX: 28.15 KG/M2

## 2024-04-13 DIAGNOSIS — J02.9 PHARYNGITIS, UNSPECIFIED ETIOLOGY: Primary | ICD-10-CM

## 2024-04-13 DIAGNOSIS — K13.79 MOUTH SORES: ICD-10-CM

## 2024-04-13 LAB
DEPRECATED S PYO AG THROAT QL EIA: NEGATIVE
GROUP A STREP BY PCR: NOT DETECTED

## 2024-04-13 PROCEDURE — 87651 STREP A DNA AMP PROBE: CPT | Performed by: PHYSICIAN ASSISTANT

## 2024-04-13 PROCEDURE — 99213 OFFICE O/P EST LOW 20 MIN: CPT | Performed by: PHYSICIAN ASSISTANT

## 2024-04-13 RX ORDER — LIDOCAINE HYDROCHLORIDE 20 MG/ML
15 SOLUTION OROPHARYNGEAL
Qty: 210 ML | Refills: 0 | Status: SHIPPED | OUTPATIENT
Start: 2024-04-13 | End: 2024-04-20

## 2024-04-13 RX ORDER — CHLORHEXIDINE GLUCONATE ORAL RINSE 1.2 MG/ML
15 SOLUTION DENTAL 2 TIMES DAILY
Qty: 210 ML | Refills: 0 | Status: SHIPPED | OUTPATIENT
Start: 2024-04-13 | End: 2024-04-20

## 2024-04-13 ASSESSMENT — PAIN SCALES - GENERAL: PAINLEVEL: NO PAIN (0)

## 2024-04-13 NOTE — PROGRESS NOTES
Pharyngitis, unspecified etiology  - Streptococcus A Rapid Screen w/Reflex to PCR - Clinic Collect  - lidocaine, viscous, (XYLOCAINE) 2 % solution; Swish and spit 15 mLs in mouth every 3 hours as needed for pain ; Max 8 doses/24 hour period.  - Group A Streptococcus PCR Throat Swab    Mouth sores  - chlorhexidine (PERIDEX) 0.12 % solution; Swish and spit 15 mLs in mouth 2 times daily for 7 days  - lidocaine, viscous, (XYLOCAINE) 2 % solution; Swish and spit 15 mLs in mouth every 3 hours as needed for pain ; Max 8 doses/24 hour period.      General Tips for All Ages:    Rest and Hydration:  Allow yourself the time to rest and prioritize hydration.  Stay well-hydrated with water, clear broths, and soothing herbal teas.    Symptomatic Relief:  Over-the-counter (OTC) medications can help alleviate symptoms.  Consider non-pharmacological options like a warm saltwater gargle for soothing a sore throat.    For Infants and Children (Under 2 Years):    Nasal Saline Drops:  Use saline drops to clear nasal congestion in infants.  Administer 1-2 drops in each nostril before feeding or bedtime.    Humidifier:  Place a cool-mist humidifier in the room to ease congestion.  Remember to clean the humidifier regularly.  Okay to use Zarbee's     Acetaminophen (if applicable):  Use acetaminophen for fever and discomfort.  Follow dosing guidelines based on your child's weight.  Avoid aspirin in children to prevent Reye's syndrome.    Contact Pediatrician:  If symptoms persist or worsen, or if your child shows signs of respiratory distress, contact your pediatrician.    For Children (2-12 Years):    Nasal Saline Solution:  Encourage the use of saline nasal spray for congestion relief.  Teach your child to blow their nose gently.    Honey (for those over 1 year):  Use honey to soothe a cough (1-2 teaspoons as needed).  Do not give honey to children under 1 year due to the risk of botulism.    Acetaminophen or Ibuprofen:  Use  acetaminophen or ibuprofen for fever and pain relief.  Follow dosing guidelines based on your child's weight.    Fluid Intake:  Ensure your child drinks warm liquids like herbal teas and broths.  Monitor their fluid intake to keep them hydrated.    For Adolescents and Adults (12 Years and Older):    Decongestants (Pseudoephedrine/Phenylephrine):  Consider OTC decongestants for nasal congestion.  Use with caution if you have hypertension, and avoid prolonged use.    Cough Suppressants (Dextromethorphan):  Choose a cough suppressant for persistent cough.  Avoid in children under 12 years; use honey instead.  Follow package instructions and avoid multiple medications with similar ingredients.    Pain Relievers (Acetaminophen or Ibuprofen):  Use acetaminophen or ibuprofen for pain and fever.  Follow package instructions.  Take ibuprofen with food to minimize stomach upset.    Rest and Isolation:  Prioritize rest and stay at home to prevent spreading the infection.    For All Ages:    Hydration:  Ensure you stay well-hydrated; monitor urine color to gauge hydration.    Hand Hygiene:  Wash hands with soap and water for at least 20 seconds.  Use hand  with at least 60% alcohol if soap is unavailable.    Avoid Tobacco Smoke:  Stay away from tobacco smoke, which can worsen respiratory symptoms.    Seek Medical Attention:  If symptoms worsen or if you experience difficulty breathing, seek medical attention promptly.  Look out for red flag symptoms like persistent high fever, severe headache, or chest pain.    Patient advised to return to clinic for reevaluation (either UC or PCP) if symptoms do not improve in 7 days. Patient educated on red flag symptoms and asked to go directly to the ED if these symptoms present themselves.     Remember, this guide is meant to assist you, but individual cases may vary. If you have concerns or if symptoms persist, don't hesitate to reach out to a healthcare professional. Wishing you  a speedy recovery!      JAVED Arriaga [facility]    Subjective   33 year old who presents to clinic today for the following health issues:    Urgent Care       HPI     Acute Illness  Acute illness concerns: Sore throat, fever, swollen gums, and sores   Onset/Duration: 3 days   Symptoms:  Fever: YES  Chills/Sweats: YES  Headache (location?): YES  Sinus Pressure: No  Conjunctivitis:  No  Ear Pain: no  Rhinorrhea: No  Congestion: No  Sore Throat: YES  Cough: YES  Wheeze: No  Decreased Appetite: No  Nausea: No  Vomiting: No  Diarrhea: No  Dysuria/Freq.: No  Dysuria or Hematuria: No  Fatigue/Achiness: YES  Sick/Strep Exposure:  was sick last week but with similar symptoms. Covid test was negative a couple of days ago.    Therapies tried and outcome: Tylenol and Ibuprofen - Orajel     Review of Systems   Review of Systems   See HPI    Objective    Temp: 98.1  F (36.7  C) Temp src: Temporal BP: 122/80 Pulse: 83   Resp: 19 SpO2: 97 %       Physical Exam   Physical Exam  Constitutional:       General: She is not in acute distress.     Appearance: Normal appearance. She is normal weight. She is not ill-appearing, toxic-appearing or diaphoretic.   HENT:      Head: Normocephalic and atraumatic.      Right Ear: Tympanic membrane, ear canal and external ear normal. There is no impacted cerumen.      Left Ear: Tympanic membrane, ear canal and external ear normal. There is no impacted cerumen.      Nose: Nose normal. No congestion or rhinorrhea.      Mouth/Throat:      Mouth: Mucous membranes are moist.      Pharynx: Posterior oropharyngeal erythema present. No oropharyngeal exudate.        Comments: Patient has a mouth sore in the area shown above  Cardiovascular:      Rate and Rhythm: Normal rate and regular rhythm.      Pulses: Normal pulses.      Heart sounds: Normal heart sounds. No murmur heard.     No friction rub. No gallop.   Pulmonary:      Effort: Pulmonary effort is normal. No  respiratory distress.      Breath sounds: No stridor. No wheezing, rhonchi or rales.   Chest:      Chest wall: No tenderness.   Lymphadenopathy:      Cervical: Cervical adenopathy present.   Neurological:      General: No focal deficit present.      Mental Status: She is alert and oriented to person, place, and time. Mental status is at baseline.      Gait: Gait normal.   Psychiatric:         Mood and Affect: Mood normal.         Behavior: Behavior normal.         Thought Content: Thought content normal.         Judgment: Judgment normal.          Results for orders placed or performed in visit on 04/13/24 (from the past 24 hour(s))   Streptococcus A Rapid Screen w/Reflex to PCR - Clinic Collect    Specimen: Throat; Swab   Result Value Ref Range    Group A Strep antigen Negative Negative

## 2024-04-14 ENCOUNTER — HOSPITAL ENCOUNTER (EMERGENCY)
Facility: CLINIC | Age: 34
Discharge: HOME | End: 2024-04-14
Attending: EMERGENCY MEDICINE | Admitting: EMERGENCY MEDICINE
Payer: COMMERCIAL

## 2024-04-14 VITALS
DIASTOLIC BLOOD PRESSURE: 96 MMHG | HEIGHT: 69 IN | RESPIRATION RATE: 18 BRPM | BODY MASS INDEX: 28.14 KG/M2 | TEMPERATURE: 99.3 F | OXYGEN SATURATION: 97 % | HEART RATE: 104 BPM | WEIGHT: 190 LBS | SYSTOLIC BLOOD PRESSURE: 141 MMHG

## 2024-04-14 DIAGNOSIS — K05.10 GINGIVOSTOMATITIS: ICD-10-CM

## 2024-04-14 LAB
BASOPHILS # BLD AUTO: 0 10E3/UL (ref 0–0.2)
BASOPHILS NFR BLD AUTO: 0 %
EOSINOPHIL # BLD AUTO: 0.1 10E3/UL (ref 0–0.7)
EOSINOPHIL NFR BLD AUTO: 1 %
ERYTHROCYTE [DISTWIDTH] IN BLOOD BY AUTOMATED COUNT: 12.1 % (ref 10–15)
HCT VFR BLD AUTO: 42.1 % (ref 35–47)
HGB BLD-MCNC: 13.9 G/DL (ref 11.7–15.7)
IMM GRANULOCYTES # BLD: 0 10E3/UL
IMM GRANULOCYTES NFR BLD: 0 %
LYMPHOCYTES # BLD AUTO: 1.4 10E3/UL (ref 0.8–5.3)
LYMPHOCYTES NFR BLD AUTO: 15 %
MCH RBC QN AUTO: 29.1 PG (ref 26.5–33)
MCHC RBC AUTO-ENTMCNC: 33 G/DL (ref 31.5–36.5)
MCV RBC AUTO: 88 FL (ref 78–100)
MONOCYTES # BLD AUTO: 1.1 10E3/UL (ref 0–1.3)
MONOCYTES NFR BLD AUTO: 12 %
MONOCYTES NFR BLD AUTO: NEGATIVE %
NEUTROPHILS # BLD AUTO: 6.5 10E3/UL (ref 1.6–8.3)
NEUTROPHILS NFR BLD AUTO: 72 %
NRBC # BLD AUTO: 0 10E3/UL
NRBC BLD AUTO-RTO: 0 /100
PLATELET # BLD AUTO: 177 10E3/UL (ref 150–450)
RBC # BLD AUTO: 4.77 10E6/UL (ref 3.8–5.2)
WBC # BLD AUTO: 9.1 10E3/UL (ref 4–11)

## 2024-04-14 PROCEDURE — 87529 HSV DNA AMP PROBE: CPT | Mod: 59 | Performed by: EMERGENCY MEDICINE

## 2024-04-14 PROCEDURE — 86308 HETEROPHILE ANTIBODY SCREEN: CPT | Performed by: EMERGENCY MEDICINE

## 2024-04-14 PROCEDURE — 85025 COMPLETE CBC W/AUTO DIFF WBC: CPT | Performed by: EMERGENCY MEDICINE

## 2024-04-14 PROCEDURE — 99284 EMERGENCY DEPT VISIT MOD MDM: CPT

## 2024-04-14 PROCEDURE — 36415 COLL VENOUS BLD VENIPUNCTURE: CPT | Performed by: EMERGENCY MEDICINE

## 2024-04-14 PROCEDURE — 250N000013 HC RX MED GY IP 250 OP 250 PS 637: Performed by: EMERGENCY MEDICINE

## 2024-04-14 RX ORDER — HYDROCODONE BITARTRATE AND ACETAMINOPHEN 5; 325 MG/1; MG/1
1 TABLET ORAL EVERY 6 HOURS PRN
Qty: 12 TABLET | Refills: 0 | Status: SHIPPED | OUTPATIENT
Start: 2024-04-14

## 2024-04-14 RX ORDER — FAMCICLOVIR 500 MG/1
500 TABLET ORAL 3 TIMES DAILY
Qty: 21 TABLET | Refills: 0 | Status: SHIPPED | OUTPATIENT
Start: 2024-04-14 | End: 2024-04-21

## 2024-04-14 RX ORDER — ACETAMINOPHEN 500 MG
1000 TABLET ORAL ONCE
Status: COMPLETED | OUTPATIENT
Start: 2024-04-14 | End: 2024-04-14

## 2024-04-14 RX ADMIN — ACETAMINOPHEN 1000 MG: 500 TABLET, FILM COATED ORAL at 11:36

## 2024-04-14 ASSESSMENT — ACTIVITIES OF DAILY LIVING (ADL)
ADLS_ACUITY_SCORE: 33
ADLS_ACUITY_SCORE: 35
ADLS_ACUITY_SCORE: 35

## 2024-04-14 NOTE — DISCHARGE INSTRUCTIONS
Keep a look at my chart to see if your viral PCR for herpes comes back, this will usually take a few days.  This looks like a viral gingivostomatitis.  There are several causes for this.  You may take ibuprofen as well for the pain.  Do not take Tylenol while you are taking Norco.

## 2024-04-14 NOTE — ED PROVIDER NOTES
History     Chief Complaint:  Pharyngitis and Fever       HPI   Beata Joseph is a 33 year old female who presents to the ED with a several day history of lesions to her gingiva and to the tip of her tongue both the dorsal and the ventral surface.  She went to urgent care and had strep testing done which was negative.  She has had a mild sore throat as well.  She is also noticed some enlarged lymph nodes in the neck.  Other than these symptoms the patient is feeling pretty well.  She is having significant pain from the lesions.  She has not noticed any lesions to the outer aspect of the lips.  She does not have a known underlying rheumatologic disorder.      Independent Historian:   None    Review of External Notes:  None    Allergies:  Cephalosporins  Cats  No Clinical Screening - See Comments  Pollen Extract     Listed Medications:    famciclovir (FAMVIR) 500 MG tablet  HYDROcodone-acetaminophen (NORCO) 5-325 MG tablet  buPROPion (WELLBUTRIN XL) 150 MG 24 hr tablet  chlorhexidine (PERIDEX) 0.12 % solution  escitalopram (LEXAPRO) 20 MG tablet  lidocaine, viscous, (XYLOCAINE) 2 % solution  NIFEdipine ER OSMOTIC (PROCARDIA XL) 30 MG 24 hr tablet  norethindrone (JENCYCLA) 0.35 MG tablet  omeprazole (PRILOSEC) 20 MG DR capsule  SENNA-docusate sodium (SENNA S) 8.6-50 MG tablet        Past Medical and Surgical History:    Past Medical History:   Diagnosis Date    Abnormal cervical Papanicolaou smear 2018    Cervical high risk HPV (human papillomavirus) test positive 2018    Depressive disorder 2020    Gastroesophageal reflux disease without esophagitis 2020    Gestational HTN, third trimester 2023    Urinary tract infection      Past Surgical History:   Procedure Laterality Date    ABDOMEN SURGERY  2023     SECTION N/A 2023    Procedure:  SECTION;  Surgeon: Ruth Ferreira MD;  [facility]: [facility]    COLPOSCOPY  2021    ENT SURGERY      Tonsillectomy  "       Family History:    family history includes Depression in her father and sister; Diabetes in her maternal grandfather; Hyperlipidemia in her mother; Hypertension in her maternal grandmother; Mental Illness in her father; No Known Problems in her brother, paternal grandmother, and another family member; Thyroid Disease in her mother.    Social History:   reports that she has never smoked. She has never been exposed to tobacco smoke. She has never used smokeless tobacco. She reports current alcohol use. She reports that she does not use drugs.      Physical Exam   Patient Vitals for the past 24 hrs:   BP Temp Temp src Pulse Resp SpO2 Height Weight   04/14/24 1134 -- -- -- -- -- 97 % -- --   04/14/24 1102 (!) 141/96 99.3  F (37.4  C) Oral 104 18 -- 1.753 m (5' 9\") 86.2 kg (190 lb)        General: Resting uncomfortably on the gurney  Head:  The scalp, face, and head appear normal  Eyes:  The pupils are equal, round, and reactive to light    There is no nystagmus    Extraocular muscles are intact    Conjunctivae and sclerae are normal  ENT:    The nose is normal    Pinnae are normal    Oropharynx reveals no significant marked erythema involving the palatoglossal arches.  The uvula is in the midline.  There is no vesicular lesions to the posterior pharynx.  Nothing concerning for herpangina.  The patient does have some viral appearing ulcerations to the tip of the tongue dorsal aspect distally also several seen to the ventral aspect down where the submandibular ducts to drain.  She has several punctate erosions to the gingiva as well.  There are no lesions to the mucocutaneous junction of the lip.  An acute herpetic gingivostomatitis could have this appearance so could other viral infections.  Coxsackievirus involving the posterior pharynx is unlikely given this presentation.  I did test the patient for PCR for HSV-1 and HSV-2 and she will be started empirically on famciclovir.   Neck:  Normal range of " motion    There is no rigidity noted  CV:  Regular rate  Normal underlying rhythm     Normal S1/S2    No pathological murmur detected  Resp:  Lungs are clear    There is no tachypnea    Non-labored    No rales    No wheezing   GI:  Abdomen is soft, there is no rigidity    No distension/tympani    No rebound tenderness     Non-surgical without peritoneal features at this time  MS:  Normal muscular tone    Symmetric motor strength    No major joint effusions    No asymmetric leg swelling, no calf tenderness  Skin:  No rash or acute skin lesions noted  Neuro:  Speech is normal and fluent, there is no aphasia    No motor deficits    Cranial nerves are intact  Psych: Awake. Alert.      Normal affect.  Appropriate interactions.  Lymph nodes: He has bilateral prominent anterior cervical lymphadenopathy.  No posterior cervical lymphadenopathy.            Emergency Department Course   EKG:     Imaging:  No orders to display        Reports in this section have been read by the radiologist.    Laboratory:  Labs Ordered and Resulted from Time of ED Arrival to Time of ED Departure   MONONUCLEOSIS SCREEN - Normal       Result Value    Mononucleosis Screen Negative     CBC WITH PLATELETS AND DIFFERENTIAL    WBC Count 9.1      RBC Count 4.77      Hemoglobin 13.9      Hematocrit 42.1      MCV 88      MCH 29.1      MCHC 33.0      RDW 12.1      Platelet Count 177      % Neutrophils 72      % Lymphocytes 15      % Monocytes 12      % Eosinophils 1      % Basophils 0      % Immature Granulocytes 0      NRBCs per 100 WBC 0      Absolute Neutrophils 6.5      Absolute Lymphocytes 1.4      Absolute Monocytes 1.1      Absolute Eosinophils 0.1      Absolute Basophils 0.0      Absolute Immature Granulocytes 0.0      Absolute NRBCs 0.0     HERPES SIMPLEX VIRUS 1&2 PCR        Procedures:  Procedures       Emergency Department Course & Assessments:             Interventions/ED Medications:  Medications   acetaminophen (TYLENOL) tablet 1,000 mg  (1,000 mg Oral $Given 4/14/24 0024)        Assessments/Consultations/Discussion of Management:     This patient was seen on arrival.      Disposition:  Home    Impression & Plan        Medical Decision Making:  This patient presents with a several day history of what looks like a viral gingivostomatitis.  White count is normal.  Mono screening is negative.  HSV 1 and 2 PCR are pending at this time.  I am going to treat this patient empirically for herpetic infection based on the presentation and location.  The patient does have MyChart so if this result comes up negative the patient can stop the antivirals.  The cervical lymphadenopathy in the neck is reactive and is secondary to the viral infection in the mouth.  There is no indication of bacterial infection in the mouth.        Diagnosis:    ICD-10-CM    1. Gingivostomatitis  K05.10              Discharge Medications (if applicable):  Discharge Medication List as of 4/14/2024  1:19 PM        START taking these medications    Details   famciclovir (FAMVIR) 500 MG tablet Take 1 tablet (500 mg) by mouth 3 times daily for 7 days, Disp-21 tablet, R-0, E-Prescribe      HYDROcodone-acetaminophen (NORCO) 5-325 MG tablet Take 1 tablet by mouth every 6 hours as needed for moderate pain, Disp-12 tablet, R-0, E-Prescribe                Reynaldo Burns MD  4/14/2024   No att. providers found        Reynaldo Burns MD  04/14/24 [id number]

## 2024-04-15 ENCOUNTER — OFFICE VISIT (OUTPATIENT)
Dept: URGENT CARE | Facility: URGENT CARE | Age: 34
End: 2024-04-15
Payer: COMMERCIAL

## 2024-04-15 VITALS
HEIGHT: 69 IN | OXYGEN SATURATION: 97 % | DIASTOLIC BLOOD PRESSURE: 80 MMHG | TEMPERATURE: 98 F | WEIGHT: 190 LBS | RESPIRATION RATE: 16 BRPM | HEART RATE: 123 BPM | SYSTOLIC BLOOD PRESSURE: 122 MMHG | BODY MASS INDEX: 28.14 KG/M2

## 2024-04-15 DIAGNOSIS — R31.29 OTHER MICROSCOPIC HEMATURIA: Primary | ICD-10-CM

## 2024-04-15 DIAGNOSIS — B37.31 CANDIDIASIS OF VAGINA: ICD-10-CM

## 2024-04-15 LAB
ALBUMIN UR-MCNC: NEGATIVE MG/DL
APPEARANCE UR: CLEAR
BILIRUB UR QL STRIP: NEGATIVE
CLUE CELLS: ABNORMAL
COLOR UR AUTO: YELLOW
GLUCOSE UR STRIP-MCNC: NEGATIVE MG/DL
HGB UR QL STRIP: ABNORMAL
HSV1 DNA SPEC QL NAA+PROBE: DETECTED
HSV2 DNA SPEC QL NAA+PROBE: NOT DETECTED
KETONES UR STRIP-MCNC: 40 MG/DL
LEUKOCYTE ESTERASE UR QL STRIP: NEGATIVE
NITRATE UR QL: NEGATIVE
PH UR STRIP: 6.5 [PH] (ref 5–7)
RBC #/AREA URNS AUTO: ABNORMAL /HPF
SP GR UR STRIP: 1.01 (ref 1–1.03)
SQUAMOUS #/AREA URNS AUTO: ABNORMAL /LPF
TRICHOMONAS, WET PREP: ABNORMAL
UROBILINOGEN UR STRIP-ACNC: 0.2 E.U./DL
WBC #/AREA URNS AUTO: ABNORMAL /HPF
WBC'S/HIGH POWER FIELD, WET PREP: ABNORMAL
YEAST, WET PREP: PRESENT

## 2024-04-15 PROCEDURE — 99213 OFFICE O/P EST LOW 20 MIN: CPT | Performed by: PHYSICIAN ASSISTANT

## 2024-04-15 PROCEDURE — 87210 SMEAR WET MOUNT SALINE/INK: CPT | Performed by: PHYSICIAN ASSISTANT

## 2024-04-15 PROCEDURE — 81001 URINALYSIS AUTO W/SCOPE: CPT | Performed by: PHYSICIAN ASSISTANT

## 2024-04-15 RX ORDER — FLUCONAZOLE 150 MG/1
150 TABLET ORAL
Qty: 3 TABLET | Refills: 0 | Status: SHIPPED | OUTPATIENT
Start: 2024-04-15 | End: 2024-04-22

## 2024-04-15 ASSESSMENT — ENCOUNTER SYMPTOMS
FLANK PAIN: 0
DYSURIA: 1

## 2024-04-15 NOTE — PROGRESS NOTES
SUBJECTIVE:   Beata Joseph is a 33 year old female presenting with a chief complaint of   Chief Complaint   Patient presents with     Urgent Care     Symptoms x 4-5 days, urgency and pain with urination, also having frequency.        She is an established patient of [facility].  Patient presents with dysuria and vaginal burning x 4-5 days.  Increased urgency.  Tmax 102 (has oral herpetic infection at this time).  Menses due in 1.5 weeks.  No hx of kidney stones.        Review of Systems   Genitourinary:  Positive for dysuria and urgency. Negative for flank pain and vaginal discharge.   All other systems reviewed and are negative.      Past Medical History:   Diagnosis Date     Abnormal cervical Papanicolaou smear 05/16/2018 5/16/18, 06/09/21     Cervical high risk HPV (human papillomavirus) test positive 05/16/2018 05/16/18, 05/22/2019, 07/15/20, 06/9/21     Depressive disorder 01/2020     Gastroesophageal reflux disease without esophagitis 7/22/2020     Gestational HTN, third trimester 2/12/2023     Urinary tract infection      Family History   Problem Relation Age of Onset     Hyperlipidemia Mother      Thyroid Disease Mother      Depression Father      Mental Illness Father         Bipolar     Depression Sister      Diabetes Maternal Grandfather      Hypertension Maternal Grandmother         Grandma, grandpa, uncle, and aunt have h/o high BP despite being healthy/active.     No Known Problems Brother      No Known Problems Paternal Grandmother      No Known Problems Other      Current Outpatient Medications   Medication Sig Dispense Refill     buPROPion (WELLBUTRIN XL) 150 MG 24 hr tablet Take 1 tablet (150 mg) by mouth every morning 90 tablet 3     escitalopram (LEXAPRO) 20 MG tablet Take 1 tablet (20 mg) by mouth daily 90 tablet 3     famciclovir (FAMVIR) 500 MG tablet Take 1 tablet (500 mg) by mouth 3 times daily for 7 days 21 tablet 0     fluconazole (DIFLUCAN) 150 MG tablet Take 1 tablet (150 mg) by  "mouth every 3 days for 3 doses 3 tablet 0     HYDROcodone-acetaminophen (NORCO) 5-325 MG tablet Take 1 tablet by mouth every 6 hours as needed for moderate pain 12 tablet 0     lidocaine, viscous, (XYLOCAINE) 2 % solution Swish and spit 15 mLs in mouth every 3 hours as needed for pain ; Max 8 doses/24 hour period. 210 mL 0     NIFEdipine ER OSMOTIC (PROCARDIA XL) 30 MG 24 hr tablet Take 1 tablet (30 mg) by mouth daily 90 tablet 3     norethindrone (JENCYCLA) 0.35 MG tablet Take 1 tablet (0.35 mg) by mouth daily 84 tablet 3     omeprazole (PRILOSEC) 20 MG DR capsule Take 1 capsule (20 mg) by mouth daily 90 capsule 3     chlorhexidine (PERIDEX) 0.12 % solution Swish and spit 15 mLs in mouth 2 times daily for 7 days 210 mL 0     SENNA-docusate sodium (SENNA S) 8.6-50 MG tablet Take 1 tablet by mouth at bedtime 90 tablet 3     Social History     Tobacco Use     Smoking status: Never     Passive exposure: Never     Smokeless tobacco: Never   Substance Use Topics     Alcohol use: Yes       OBJECTIVE  /80   Pulse (!) 123   Temp 98  F (36.7  C) (Temporal)   Resp 16   Ht 1.753 m (5' 9\")   Wt 86.2 kg (190 lb)   LMP 02/28/2024   SpO2 97%   BMI 28.06 kg/m      Physical Exam  Vitals and nursing note reviewed.   Constitutional:       Appearance: Normal appearance. She is normal weight.   Eyes:      Extraocular Movements: Extraocular movements intact.      Conjunctiva/sclera: Conjunctivae normal.   Cardiovascular:      Rate and Rhythm: Normal rate.   Abdominal:      Tenderness: There is no right CVA tenderness or left CVA tenderness.   Neurological:      General: No focal deficit present.      Mental Status: She is alert.   Psychiatric:         Mood and Affect: Mood normal.         Behavior: Behavior normal.       Labs:  Results for orders placed or performed in visit on 04/15/24 (from the past 24 hour(s))   UA Macroscopic with reflex to Microscopic and Culture - Clinic Collect    Specimen: Urine, Midstream   Result " Value Ref Range    Color Urine Yellow Colorless, Straw, Light Yellow, Yellow    Appearance Urine Clear Clear    Glucose Urine Negative Negative mg/dL    Bilirubin Urine Negative Negative    Ketones Urine 40 (A) Negative mg/dL    Specific Gravity Urine 1.015 1.003 - 1.035    Blood Urine Trace (A) Negative    pH Urine 6.5 5.0 - 7.0    Protein Albumin Urine Negative Negative mg/dL    Urobilinogen Urine 0.2 0.2, 1.0 E.U./dL    Nitrite Urine Negative Negative    Leukocyte Esterase Urine Negative Negative   UA Microscopic with Reflex to Culture   Result Value Ref Range    RBC Urine 5-10 (A) 0-2 /HPF /HPF    WBC Urine 0-5 0-5 /HPF /HPF    Squamous Epithelials Urine Few (A) None Seen /LPF    Narrative    Urine Culture not indicated   Wet prep - lab collect    Specimen: Vagina; Swab   Result Value Ref Range    Trichomonas Absent Absent    Yeast Present (A) Absent    Clue Cells Absent Absent    WBCs/high power field 1+ (A) None       ASSESSMENT:      ICD-10-CM    1. Other microscopic hematuria  R31.29       2. Candidiasis of vagina  B37.31 fluconazole (DIFLUCAN) 150 MG tablet           Medical Decision Making:    Differential Diagnosis:  UTI: UTI, Dysuria, Pyelonephritis, Kidney Stone, and Urethritis    Serious Comorbid Conditions:  Adult:   reviewed    PLAN:    Rx for diflucan.  Increase fluid intake. Discussed reasons to seek immediate medical attention - specifically, fevers or vomiting.  Repeat UA in a few weeks.  If still blood may need urology workup.  Discussed possibility of nephrolithiasis, no symptoms at this time.        Followup:    If not improving or if condition worsens, follow up with your Primary Care Provider, If not improving or if conditions worsens over the next 12-24 hours, go to the Emergency Department    There are no Patient Instructions on file for this visit.

## 2024-04-17 ENCOUNTER — TELEPHONE (OUTPATIENT)
Dept: EMERGENCY MEDICINE | Facility: CLINIC | Age: 34
End: 2024-04-17
Payer: COMMERCIAL

## 2024-04-17 NOTE — TELEPHONE ENCOUNTER
[facility]    Reason for call: Lab Result Notification     Lab Result (including Rx patient on, if applicable).  If culture, copy of lab report at bottom.  Lab Result:   Component      Latest Ref Rng 4/14/2024  11:37 AM   HSV Type 1 PCR      Not Detected  Detected !       Legend:  ! Abnormal    Prescribed famciclovir (FAMVIR) 500 MG tablet PO TID x 7 days    Creatinine Level (mg/dl)   Creatinine   Date Value Ref Range Status   03/14/2024 0.62 0.51 - 0.95 mg/dL Final   06/14/2021 0.64 0.52 - 1.04 mg/dL Final    Creatinine clearance (ml/min), if applicable    Serum creatinine: 0.62 mg/dL 03/14/24 0754  Estimated creatinine clearance: 152.6 mL/min     Patient's current Symptoms:   Patient states that she is improving. States that she is still having quite a bit of mouth pain but is taking the Hydrocodone and tylenol which is helpful.     RN Recommendations/Instructions per [facility] ED lab result protocol:   [facility] ED lab result protocol utilized: Herpes Simplex    Patient/care giver notified to contact your PCP clinic or return to the Emergency department if your:  Symptoms return.  Symptoms worsen or other concerning symptoms.    BALBIR DACOSTA RN

## 2024-04-17 NOTE — LETTER
April 17, 2024        Beata Joseph  [address] [place] [id number]          Dear Beata Joseph:    You were seen in the [facility] Emergency Department at [facility] EMERGENCY DEPT on 4/14/2024.  We are unable to reach you by phone, so we are sending you this letter.     It is important that you call [facility] Emergency Department lab result nurse at [phone], as we have information to relay to you AND/OR we MAY have to make some changes in your treatment.    Best time to call back is between 9AM and 5:30PM, 7 days a week.      Sincerely,     [facility] Emergency Department Lab Result RN  [phone]

## 2024-04-17 NOTE — TELEPHONE ENCOUNTER
[facility]    Reason for call: Lab Result Notification     Lab Result (including Rx patient on, if applicable).  If culture, copy of lab report at bottom.  Lab Result: Final result for Herpes Simplex Virus 1 PCR is [POSITIVE] and Herpes Simplex Virus 2 PCR is [NEGATIVE].    Specimen information:  mouth; swab  [facility] Emergency Dept discharge antiviral medication (if prescribed): Famciclovir 500mg oral TID x 7 days  Recommendations in Treatment per [facility] ED Lab Result Herpes Simplex Virus Protocol     Creatinine Level (mg/dl)   Creatinine   Date Value Ref Range Status   03/14/2024 0.62 0.51 - 0.95 mg/dL Final   06/14/2021 0.64 0.52 - 1.04 mg/dL Final    Creatinine clearance (ml/min), if applicable    Serum creatinine: 0.62 mg/dL 03/14/24 0754  Estimated creatinine clearance: 152.6 mL/min     Left voicemail message requesting a call back to [facility] ED Lab Result RN at [phone]. RN is available every day between 9 a.m. and 5:30 p.m. Mychart letter sent    Patient's current Symptoms:       RN Recommendations/Instructions per [facility] ED lab result protocol:   [facility] ED lab result protocol utilized: Herpes Simplex    Patient/care giver notified to contact your PCP clinic or return to the Emergency department if your:  Symptoms return.  Symptoms worsen or other concerning symptoms.    Alanis Mullen, RN

## 2024-06-02 DIAGNOSIS — Z30.09 FAMILY PLANNING: ICD-10-CM

## 2024-06-03 RX ORDER — NORETHINDRONE 0.35 MG/1
0.35 TABLET ORAL DAILY
Qty: 84 TABLET | Refills: 3 | Status: SHIPPED | OUTPATIENT
Start: 2024-06-03

## 2024-11-18 ENCOUNTER — OFFICE VISIT (OUTPATIENT)
Dept: URGENT CARE | Facility: URGENT CARE | Age: 34
End: 2024-11-18
Payer: COMMERCIAL

## 2024-11-18 VITALS
DIASTOLIC BLOOD PRESSURE: 87 MMHG | SYSTOLIC BLOOD PRESSURE: 142 MMHG | TEMPERATURE: 98.7 F | BODY MASS INDEX: 28.63 KG/M2 | WEIGHT: 193.9 LBS | OXYGEN SATURATION: 98 % | HEART RATE: 80 BPM

## 2024-11-18 DIAGNOSIS — S00.411A EAR ABRASION, RIGHT, INITIAL ENCOUNTER: Primary | ICD-10-CM

## 2024-11-18 PROCEDURE — 99213 OFFICE O/P EST LOW 20 MIN: CPT | Performed by: FAMILY MEDICINE

## 2024-11-18 NOTE — PROGRESS NOTES
Assessment & Plan     Ear abrasion, right, initial encounter         Investigation of the entry of the ear canal shows a shallow area consistent with a mild abrasion there are no suspicious lesions and no evidence of ongoing infection in the canal looks appropriate and the tympanic membrane without any signs of infection.   Follow-up in 2 to 3 days if not improving    Parker Bowles MD   [facility]    Yan Cota is a 34 year old female who presents to clinic today for the following health issues:  Chief Complaint   Patient presents with    Ear Problem     Start 2-3 days sx right ear, pain, fullness, odor tx none      HPI    Patient presents with 2 to 3 days of right ear discomfort there is a small lesion or pimple that she attempted to remove this is caused ongoing discomfort there has been no active bleeding she has no hearing difficulties there is no symptoms on her left side.  She denies any nasal congestion.  Absent of fever.      Patient Active Problem List    Diagnosis Date Noted    Benign essential hypertension 07/27/2023     Priority: Medium    Erythema migrans (Lyme disease) 07/27/2023     Priority: Medium    Drug-induced constipation 07/27/2023     Priority: Medium    Situational stress 05/15/2023     Priority: Medium     Needed to be with her oldest sister who has been in 3 years      Family history of hypothyroidism 05/15/2023     Priority: Medium     mom.  her tsh- normral 03/2023      Postpartum hypertension 02/23/2023     Priority: Medium    Major depressive disorder, recurrent episode, mild (H) 07/27/2022     Priority: Medium     wellbutrin  mg qd  Pregnancy implication- discussed in detail.  [place] 3/20/23      Anxiety 07/07/2021     Priority: Medium    Visual field scotoma of left eye 07/07/2021     Priority: Medium    Gallbladder attack 07/22/2020     Priority: Medium    Gastroesophageal reflux disease without esophagitis 07/22/2020     Priority: Medium    ASCUS with  positive high risk HPV cervical 05/16/2018     Priority: Medium     5/16/18 ASCUS, +HR HPV, not 16/18. Plan colp.  06/13/18: [place] Bx and ECC neg for dysplasia. Plan cotest in 1 year.   05/22/19: NIL Pap, + HR HPV (not 16 or 18) result. Plan [place].  07/10/19: [place] ECC Benign. Plan Pap in 1 year.   7/15/20 NIL Pap, + HR HPV (not 16 or 18). Plan pap in one year due by 7/15/21 per provider see josy from 07/23/20.  06/09/21 ASCUS Pap, + HR HPV (not 16 or 18). Plan [place] due bef 09/09/21.  06/17/21 Msg left on voice mail to return call or advised patient to review results and recommendations that were released through [facility], pt viewed.  7/9/21 [place]: Bx normal, ECC neg.  Plan: cotest in 1 year  6/13/22 NIL pap, Neg HPV. Plan: cotest in 3 years        Screening for malignant neoplasm of cervix 05/15/2017     Priority: Medium     NIL-pap at [place], 04/2015      Routine general medical examination at a health care facility 05/15/2017     Priority: Medium    Hypertriglyceridemia 03/31/2016     Priority: Medium       Current Outpatient Medications   Medication Sig Dispense Refill    buPROPion (WELLBUTRIN XL) 150 MG 24 hr tablet Take 1 tablet (150 mg) by mouth every morning 90 tablet 3    escitalopram (LEXAPRO) 20 MG tablet Take 1 tablet (20 mg) by mouth daily 90 tablet 3    JENCYCLA 0.35 MG tablet TAKE 1 TABLET DAILY 84 tablet 3    NIFEdipine ER OSMOTIC (PROCARDIA XL) 30 MG 24 hr tablet Take 1 tablet (30 mg) by mouth daily 90 tablet 3    omeprazole (PRILOSEC) 20 MG DR capsule Take 1 capsule (20 mg) by mouth daily 90 capsule 3    SENNA-docusate sodium (SENNA S) 8.6-50 MG tablet Take 1 tablet by mouth at bedtime 90 tablet 3    famciclovir (FAMVIR) 500 MG tablet Take 1 tablet (500 mg) by mouth 3 times daily for 7 days (Patient not taking: Reported on 11/18/2024) 21 tablet 0    HYDROcodone-acetaminophen (NORCO) 5-325 MG tablet Take 1 tablet by mouth every 6 hours as needed for moderate pain (Patient not taking: Reported on  11/18/2024) 12 tablet 0     No current facility-administered medications for this visit.           Objective    BP (!) 142/87   Pulse 80   Temp 98.7  F (37.1  C) (Tympanic)   Wt 88 kg (193 lb 14.4 oz)   SpO2 98%   BMI 28.63 kg/m    Physical Exam   As noted above and including:   No results found for any visits on 11/18/24.                  The use of Dragon/[employer] dictation services may have been used to construct the content in this note; any grammatical or spelling errors are non-intentional. Please contact the author of this note directly if you are in need of any clarification.

## 2024-12-31 ENCOUNTER — OFFICE VISIT (OUTPATIENT)
Dept: URGENT CARE | Facility: URGENT CARE | Age: 34
End: 2024-12-31
Payer: COMMERCIAL

## 2024-12-31 VITALS
DIASTOLIC BLOOD PRESSURE: 86 MMHG | SYSTOLIC BLOOD PRESSURE: 125 MMHG | HEIGHT: 69 IN | HEART RATE: 104 BPM | TEMPERATURE: 99.8 F | OXYGEN SATURATION: 97 % | WEIGHT: 190 LBS | BODY MASS INDEX: 28.14 KG/M2

## 2024-12-31 DIAGNOSIS — R52 GENERALIZED BODY ACHES: ICD-10-CM

## 2024-12-31 DIAGNOSIS — J39.2 ERYTHEMA OF PHARYNX: ICD-10-CM

## 2024-12-31 DIAGNOSIS — R05.1 ACUTE COUGH: ICD-10-CM

## 2024-12-31 DIAGNOSIS — J06.9 VIRAL URI WITH COUGH: Primary | ICD-10-CM

## 2024-12-31 DIAGNOSIS — R50.9 FEVER IN ADULT: ICD-10-CM

## 2024-12-31 DIAGNOSIS — R51.9 GENERALIZED HEADACHES: ICD-10-CM

## 2024-12-31 LAB
DEPRECATED S PYO AG THROAT QL EIA: NEGATIVE
FLUAV AG SPEC QL IA: NEGATIVE
FLUBV AG SPEC QL IA: NEGATIVE
S PYO DNA THROAT QL NAA+PROBE: NOT DETECTED

## 2024-12-31 PROCEDURE — 87651 STREP A DNA AMP PROBE: CPT | Performed by: PHYSICIAN ASSISTANT

## 2024-12-31 PROCEDURE — 87804 INFLUENZA ASSAY W/OPTIC: CPT | Performed by: PHYSICIAN ASSISTANT

## 2024-12-31 NOTE — PROGRESS NOTES
ASSESSMENT/PLAN:    (J06.9) Viral URI with cough  (primary encounter diagnosis)      MDM: Acute onset upper respiratory symptoms consistent with viral URI. Influenza is negative. Rapid Strep is negative. Rapid Strep and Strep PCR test results are negative. Patient elects to pursue home Covid testing instead of Covid PCR testing here today. . No evidence of secondary bacterial infection on exam. No evidence of respiratory distress or other medical distress requiring emergent intervention at this time.      Plan:    12/31/24  Urgent Care Plan      -Home supportive care and observant management   -Ok to alternate over the counter doses of Ibuprofen 400 mg and Tylenol 650 mg  (switch from one to the other every 4 hours) for the next couple of days, as needed for sore throat and fever  -Encourage extra fluids   -Follow-up if no improvement in 3 to 5 days, if not all better in 10 days, and sooner if sudden worsening     -Watch your MyChart for your second Strep Test result (called Strep PCR).  The second test result typically comes back between 4 hours and 24 hours.  If your second test shows Strep Throat, we will contact you and start you on an antibiotic.           (R52) Generalized body aches  Plan: Streptococcus A Rapid Screen w/Reflex to PCR -         Clinic Collect, Group A Streptococcus PCR         Throat Swab      (R51.9) Generalized headaches    (R05.1) Acute cough      (R50.9) Fever in adult  Plan: Streptococcus A Rapid Screen w/Reflex to PCR -         Clinic Collect, Group A Streptococcus PCR         Throat Swab      (J39.2) Erythema of pharynx  Plan: Streptococcus A Rapid Screen w/Reflex to PCR -         Clinic Collect, Group A Streptococcus PCR         Throat Swab        This progress note has been dictated, with use of voice recognition software. Any grammatical, typographical, or context errors are unintentional and inherent to use of voice recognition software.  ---------------      Chief Complaint    Patient presents with    Headache    Urgent Care    Generalized Body Aches     Pt in clinic  to have eval for aches, fatigue, chills, cough, fatigue , fever, and congestion for 1 day.         SUBJECTIVE:    Beata Joseph to  today for evaluation of acute onset fever (highest home temperature thus far was reportedly 100.7  F), dry cough, malaise, waxing and waning body aches, waxing and waning headaches, and nasal congestion x 1 day duration.     Illness Contact: No known specific close contact illness exposure.  However, patient states she went to a wedding last week and tells me her best friend and stepmother (they also attended a wedding) have similar illness symptoms.          ROS: Positive as per above. No associated severe cough,coughing up of blood, blue lips/fingers/toes, shortness of breath (confirms ability to do all ADL's despite acute illness symptoms), chest pain, wheezing, racing or irregular heartbeats, abdominal pain, nausea, vomiting, diarrhea, severe body aches (but does have waxing and waning generalized body aches) , severe headaches (but does have waxing and waning generalized headache), rashes, neck stiffness, joint swelling or other acute illness symptoms.       Past Medical History:   Diagnosis Date    Abnormal cervical Papanicolaou smear 05/16/2018 5/16/18, 06/09/21    Cervical high risk HPV (human papillomavirus) test positive 05/16/2018 05/16/18, 05/22/2019, 07/15/20, 06/9/21    Depressive disorder 01/2020    Gastroesophageal reflux disease without esophagitis 7/22/2020    Gestational HTN, third trimester 2/12/2023    Urinary tract infection      Current Outpatient Medications   Medication Sig Dispense Refill    buPROPion (WELLBUTRIN XL) 150 MG 24 hr tablet TAKE 1 TABLET BY MOUTH EVERY MORNING 30 tablet 0    escitalopram (LEXAPRO) 20 MG tablet Take 1 tablet (20 mg) by mouth daily 90 tablet 3    JENCYCLA 0.35 MG tablet TAKE 1 TABLET DAILY 84 tablet 3    NIFEdipine ER OSMOTIC  "(PROCARDIA XL) 30 MG 24 hr tablet Take 1 tablet (30 mg) by mouth daily 90 tablet 3    omeprazole (PRILOSEC) 20 MG DR capsule Take 1 capsule (20 mg) by mouth daily 90 capsule 3    SENNA-docusate sodium (SENNA S) 8.6-50 MG tablet Take 1 tablet by mouth at bedtime 90 tablet 3     No current facility-administered medications for this visit.       Allergies   Allergen Reactions    Cephalosporins Nausea and Vomiting     Pt states should be ok    Cats Hives    No Clinical Screening - See Comments Other (See Comments)     Pollens and animal dander    Pollen Extract      Other reaction(s): Sneezing       OBJECTIVE:  /86   Pulse 104   Temp 99.8  F (37.7  C) (Oral)   Ht 1.753 m (5' 9\")   Wt 86.2 kg (190 lb)   LMP 12/26/2024   SpO2 97%   BMI 28.06 kg/m      General appearance: alert and no apparent distress. Non-toxic appearance  Skin color is uniform in color and without rash.  HEENT:   Conjunctiva not injected.  Sclera clear.  Left TM is normal: no effusions, no erythema, and normal landmarks.  Right TM is normal: no effusions, no erythema, and normal landmarks.  Nasal mucosa is congested  Oropharyngeal exam is positive for mild, diffuse, posterior pharyngeal erythema.  No asymmetry. Uvula is midline. No plaque, exudate, lesions, or ulcers.   Neck is supple, FROM. No pain or stiffness with ROM. No adenopathy  CARDIAC:NORMAL - regular rate and rhythm without murmur.  RESP: Normal - CTA without rales, rhonchi, or wheezing.  NEURO: Alert and oriented.  Normal speech and mentation.  CN II/XII grossly intact.  Gait within normal limits.      Results for orders placed or performed in visit on 12/31/24   Influenza A/B antigen     Status: Normal    Specimen: Nose; Swab   Result Value Ref Range    Influenza A antigen Negative Negative    Influenza B antigen Negative Negative    Narrative    Test results must be correlated with clinical data. If necessary, results should be confirmed by a molecular assay or viral culture. "   Streptococcus A Rapid Screen w/Reflex to PCR - Clinic Collect     Status: Normal    Specimen: Throat; Swab   Result Value Ref Range    Group A Strep antigen Negative Negative   Group A Streptococcus PCR Throat Swab     Status: Normal    Specimen: Throat; Swab   Result Value Ref Range    Group A strep by PCR Not Detected Not Detected    Narrative    The Xpert Xpress Strep A test, performed on the [employer]  Instrument Systems, is a rapid, qualitative in vitro diagnostic test for the detection of Streptococcus pyogenes (Group A ß-hemolytic Streptococcus, Strep A) in throat swab specimens from patients with signs and symptoms of pharyngitis. The Xpert Xpress Strep A test can be used as an aid in the diagnosis of Group A Streptococcal pharyngitis. The assay is not intended to monitor treatment for Group A Streptococcus infections. The Xpert Xpress Strep A test utilizes an automated real-time polymerase chain reaction (PCR) to detect Streptococcus pyogenes DNA.

## 2025-01-01 NOTE — PATIENT INSTRUCTIONS
12/31/24  Urgent Care Plan      -Home supportive care and observant management   -Ok to alternate over the counter doses of Ibuprofen 400 mg and Tylenol 650 mg  (switch from one to the other every 4 hours) for the next couple of days, as needed for sore throat and fever  -Encourage extra fluids   -Follow-up if no improvement in 3 to 5 days, if not all better in 10 days, and sooner if sudden worsening     -Watch your MyChart for your second Strep Test result (called Strep PCR).  The second test result typically comes back between 4 hours and 24 hours.  If your second test shows Strep Throat, we will contact you and start you on an antibiotic.

## 2025-01-23 DIAGNOSIS — F33.0 MAJOR DEPRESSIVE DISORDER, RECURRENT EPISODE, MILD: ICD-10-CM

## 2025-01-25 DIAGNOSIS — F33.0 MAJOR DEPRESSIVE DISORDER, RECURRENT EPISODE, MILD: ICD-10-CM

## 2025-01-27 ENCOUNTER — OFFICE VISIT (OUTPATIENT)
Dept: URGENT CARE | Facility: URGENT CARE | Age: 35
End: 2025-01-27
Payer: COMMERCIAL

## 2025-01-27 VITALS
TEMPERATURE: 98.8 F | SYSTOLIC BLOOD PRESSURE: 147 MMHG | BODY MASS INDEX: 29.06 KG/M2 | WEIGHT: 196.8 LBS | OXYGEN SATURATION: 97 % | DIASTOLIC BLOOD PRESSURE: 94 MMHG | HEART RATE: 84 BPM

## 2025-01-27 DIAGNOSIS — R30.0 DYSURIA: ICD-10-CM

## 2025-01-27 DIAGNOSIS — N30.00 ACUTE CYSTITIS WITHOUT HEMATURIA: Primary | ICD-10-CM

## 2025-01-27 LAB
ALBUMIN UR-MCNC: 100 MG/DL
APPEARANCE UR: ABNORMAL
BACTERIA #/AREA URNS HPF: ABNORMAL /HPF
BILIRUB UR QL STRIP: NEGATIVE
COLOR UR AUTO: YELLOW
GLUCOSE UR STRIP-MCNC: NEGATIVE MG/DL
HGB UR QL STRIP: ABNORMAL
KETONES UR STRIP-MCNC: NEGATIVE MG/DL
LEUKOCYTE ESTERASE UR QL STRIP: ABNORMAL
NITRATE UR QL: POSITIVE
PH UR STRIP: 6.5 [PH] (ref 5–7)
RBC #/AREA URNS AUTO: ABNORMAL /HPF
SP GR UR STRIP: 1.01 (ref 1–1.03)
SQUAMOUS #/AREA URNS AUTO: ABNORMAL /LPF
UROBILINOGEN UR STRIP-ACNC: 0.2 E.U./DL
WBC #/AREA URNS AUTO: ABNORMAL /HPF
WBC CLUMPS #/AREA URNS HPF: PRESENT /HPF

## 2025-01-27 PROCEDURE — 99213 OFFICE O/P EST LOW 20 MIN: CPT | Performed by: PHYSICIAN ASSISTANT

## 2025-01-27 PROCEDURE — 87186 SC STD MICRODIL/AGAR DIL: CPT | Performed by: PHYSICIAN ASSISTANT

## 2025-01-27 PROCEDURE — 81001 URINALYSIS AUTO W/SCOPE: CPT | Performed by: PHYSICIAN ASSISTANT

## 2025-01-27 PROCEDURE — 87086 URINE CULTURE/COLONY COUNT: CPT | Performed by: PHYSICIAN ASSISTANT

## 2025-01-27 RX ORDER — BUPROPION HYDROCHLORIDE 150 MG/1
150 TABLET ORAL EVERY MORNING
Qty: 30 TABLET | Refills: 0 | Status: SHIPPED | OUTPATIENT
Start: 2025-01-27

## 2025-01-27 RX ORDER — SULFAMETHOXAZOLE AND TRIMETHOPRIM 800; 160 MG/1; MG/1
1 TABLET ORAL 2 TIMES DAILY
Qty: 10 TABLET | Refills: 0 | Status: SHIPPED | OUTPATIENT
Start: 2025-01-27 | End: 2025-02-01

## 2025-01-27 RX ORDER — FLUCONAZOLE 150 MG/1
150 TABLET ORAL ONCE
Qty: 1 TABLET | Refills: 0 | Status: SHIPPED | OUTPATIENT
Start: 2025-01-27 | End: 2025-01-27

## 2025-01-27 NOTE — TELEPHONE ENCOUNTER
Duplicate request  Contacted patient regarding need for visit in other refill encounter    Hayley TEJEDA RN

## 2025-01-27 NOTE — TELEPHONE ENCOUNTER
Left non detailed voicemail for patient asking that they callback.  When patient calls back please notify one month script sent  Assist with scheduling VV for follow up before next fill needed    Hayley TEJEDA RN

## 2025-01-28 NOTE — PROGRESS NOTES
Dysuria  - UA Macroscopic with reflex to Microscopic and Culture - Clinic Collect  - Urine Microscopic Exam  - Urine Culture  - sulfamethoxazole-trimethoprim (BACTRIM DS) 800-160 MG tablet; Take 1 tablet by mouth 2 times daily for 5 days.  - fluconazole (DIFLUCAN) 150 MG tablet; Take 1 tablet (150 mg) by mouth once for 1 dose.    Acute cystitis without hematuria  - sulfamethoxazole-trimethoprim (BACTRIM DS) 800-160 MG tablet; Take 1 tablet by mouth 2 times daily for 5 days.  - fluconazole (DIFLUCAN) 150 MG tablet; Take 1 tablet (150 mg) by mouth once for 1 dose.    General Tips for All Ages:    Hydration:  Why: Drinking plenty of water helps flush out bacteria from the urinary system.  What to Do: Aim for at least 8 glasses of water per day.    Cranberry Juice:  Why: Some studies suggest cranberry juice may help prevent bacterial adherence in the urinary tract.  What to Do: Drink pure, unsweetened cranberry juice. Limit added sugars.    For Adults (18 Years and Older):    Over-the-Counter (OTC) Pain Relievers:  Why: Relieves discomfort and pain.  What to Use: Ibuprofen or acetaminophen as directed on the package.    Urinary Alkalinizers (if prescribed):  Why: Alters the acidity of the urine, providing relief.  What to Do: Take as prescribed by your healthcare provider.    Antibiotics (if prescribed):  Why: Eliminates the bacterial infection.  What to Do: Take the full course of antibiotics as directed, even if symptoms improve.    For Adolescents (12-17 Years):    OTC Pain Relievers:  Why: Manages pain and discomfort.  What to Use: Ibuprofen or acetaminophen following package instructions.    Hydration:  Why: Helps flush out bacteria.  What to Do: Drink plenty of water; avoid sugary drinks.    Seek Medical Attention:  When: If symptoms persist or worsen.  What to Do: Contact your healthcare provider for further evaluation.    For Children (Under 12 Years):    Hydration:  Why: Promotes urinary system  flushing.  What to Do: Encourage your child to drink water regularly.    Avoid Irritants:  Why: Certain soaps or bubble baths can worsen symptoms.  What to Do: Use gentle, fragrance-free products for bathing.    OTC Pain Relievers (if approved by pediatrician):  Why: Eases pain and discomfort.  What to Use: Follow your pediatrician's advice on using ibuprofen or acetaminophen.    Seek Medical Attention:  When: If symptoms persist or if your child has a high fever.  What to Do: Contact your child's pediatrician for guidance.    All Ages:    Probiotics:  Why: May help restore healthy bacteria in the gut.  What to Do: Consider including probiotic-rich foods or supplements.    Avoid Irritants:  Why: Certain foods and drinks can worsen symptoms.  What to Do: Limit caffeine, spicy foods, and alcohol during the infection.    Follow-up:    Patient advised to return to clinic for reevaluation (either UC or PCP) if symptoms do not improve in 7 days. Patient educated on red flag symptoms and asked to go directly to the ED if these symptoms present themselves.     Remember, individual cases may vary, and it's crucial to follow your healthcare provider's advice. If you have concerns or if symptoms persist, don't hesitate to reach out for further guidance.    Wishing you a velasquez recovery!      Antonio Mendoza PA-C  [facility]    Subjective   34 year old who presents to clinic today for the following health issues:    No chief complaint on file.       HPI     Genitourinary - Female  Onset/Duration: Frequency, urgency, burning, and lower abdomen pain in the middle   Description:   Painful urination (Dysuria): YES           Frequency: YES  Blood in urine (Hematuria): Cloudy and patient is just finishing her period so maybe some blood from that   Delay in urine (Hesitency): No  Intensity: moderate  Progression of Symptoms:  worsening  Accompanying Signs & Symptoms:  Fever/chills: No  Flank pain: No  Nausea and  vomiting: No  Vaginal symptoms: none  Abdominal/Pelvic Pain: YES  History:   History of frequent UTI s: Couple times a year  History of kidney stones: No  Sexually Active: No  Possibility of pregnancy: No  Precipitating or alleviating factors: None  Therapies tried and outcome: Azo     Review of Systems   Review of Systems   See HPI    Objective    Temp: 98.8  F (37.1  C) Temp src: Oral BP: (!) 147/94 Pulse: 84     SpO2: 97 %       Physical Exam   Physical Exam  Constitutional:       General: She is not in acute distress.     Appearance: Normal appearance. She is normal weight. She is not ill-appearing, toxic-appearing or diaphoretic.   HENT:      Head: Normocephalic and atraumatic.   Cardiovascular:      Rate and Rhythm: Normal rate.      Pulses: Normal pulses.   Pulmonary:      Effort: Pulmonary effort is normal. No respiratory distress.   Abdominal:      Tenderness: There is no right CVA tenderness or left CVA tenderness.   Neurological:      General: No focal deficit present.      Mental Status: She is alert and oriented to person, place, and time. Mental status is at baseline.      Gait: Gait normal.   Psychiatric:         Mood and Affect: Mood normal.         Behavior: Behavior normal.         Thought Content: Thought content normal.         Judgment: Judgment normal.          Results for orders placed or performed in visit on 01/27/25 (from the past 24 hours)   UA Macroscopic with reflex to Microscopic and Culture - Clinic Collect    Specimen: Urine, Midstream   Result Value Ref Range    Color Urine Yellow Colorless, Straw, Light Yellow, Yellow    Appearance Urine Slightly Cloudy (A) Clear    Glucose Urine Negative Negative mg/dL    Bilirubin Urine Negative Negative    Ketones Urine Negative Negative mg/dL    Specific Gravity Urine 1.015 1.003 - 1.035    Blood Urine Large (A) Negative    pH Urine 6.5 5.0 - 7.0    Protein Albumin Urine 100 (A) Negative mg/dL    Urobilinogen Urine 0.2 0.2, 1.0 E.U./dL     Nitrite Urine Positive (A) Negative    Leukocyte Esterase Urine Large (A) Negative   Urine Microscopic Exam   Result Value Ref Range    Bacteria Urine Many (A) None Seen /HPF    RBC Urine 25-50 (A) 0-2 /HPF /HPF    WBC Urine  (A) 0-5 /HPF /HPF    Squamous Epithelials Urine Few (A) None Seen /LPF    WBC Clumps Urine Present (A) None Seen /HPF

## 2025-01-29 LAB — BACTERIA UR CULT: ABNORMAL

## 2025-01-30 DIAGNOSIS — N30.00 ACUTE CYSTITIS WITHOUT HEMATURIA: Primary | ICD-10-CM

## 2025-01-30 RX ORDER — NITROFURANTOIN 25; 75 MG/1; MG/1
100 CAPSULE ORAL 2 TIMES DAILY
Qty: 10 CAPSULE | Refills: 0 | Status: SHIPPED | OUTPATIENT
Start: 2025-01-30 | End: 2025-02-04

## 2025-02-12 ENCOUNTER — PATIENT OUTREACH (OUTPATIENT)
Dept: CARE COORDINATION | Facility: CLINIC | Age: 35
End: 2025-02-12
Payer: COMMERCIAL

## 2025-03-25 DIAGNOSIS — K21.9 GASTROESOPHAGEAL REFLUX DISEASE WITHOUT ESOPHAGITIS: ICD-10-CM

## 2025-03-25 DIAGNOSIS — I10 BENIGN ESSENTIAL HYPERTENSION: ICD-10-CM

## 2025-03-25 DIAGNOSIS — F41.9 ANXIETY: ICD-10-CM

## 2025-03-25 RX ORDER — OMEPRAZOLE 20 MG/1
20 CAPSULE, DELAYED RELEASE ORAL DAILY
Qty: 90 CAPSULE | Refills: 0 | Status: SHIPPED | OUTPATIENT
Start: 2025-03-25

## 2025-03-25 RX ORDER — NIFEDIPINE 30 MG/1
30 TABLET, EXTENDED RELEASE ORAL DAILY
Qty: 90 TABLET | Refills: 3 | Status: SHIPPED | OUTPATIENT
Start: 2025-03-25

## 2025-03-25 RX ORDER — ESCITALOPRAM OXALATE 20 MG/1
20 TABLET ORAL DAILY
Qty: 90 TABLET | Refills: 3 | Status: SHIPPED | OUTPATIENT
Start: 2025-03-25

## 2025-05-10 SDOH — HEALTH STABILITY: PHYSICAL HEALTH: ON AVERAGE, HOW MANY MINUTES DO YOU ENGAGE IN EXERCISE AT THIS LEVEL?: 30 MIN

## 2025-05-10 SDOH — HEALTH STABILITY: PHYSICAL HEALTH: ON AVERAGE, HOW MANY DAYS PER WEEK DO YOU ENGAGE IN MODERATE TO STRENUOUS EXERCISE (LIKE A BRISK WALK)?: 4 DAYS

## 2025-05-10 ASSESSMENT — SOCIAL DETERMINANTS OF HEALTH (SDOH): HOW OFTEN DO YOU GET TOGETHER WITH FRIENDS OR RELATIVES?: ONCE A WEEK

## 2025-05-14 ASSESSMENT — ANXIETY QUESTIONNAIRES
1. FEELING NERVOUS, ANXIOUS, OR ON EDGE: SEVERAL DAYS
7. FEELING AFRAID AS IF SOMETHING AWFUL MIGHT HAPPEN: SEVERAL DAYS
2. NOT BEING ABLE TO STOP OR CONTROL WORRYING: SEVERAL DAYS
GAD7 TOTAL SCORE: 5
4. TROUBLE RELAXING: NOT AT ALL
5. BEING SO RESTLESS THAT IT IS HARD TO SIT STILL: NOT AT ALL
IF YOU CHECKED OFF ANY PROBLEMS ON THIS QUESTIONNAIRE, HOW DIFFICULT HAVE THESE PROBLEMS MADE IT FOR YOU TO DO YOUR WORK, TAKE CARE OF THINGS AT HOME, OR GET ALONG WITH OTHER PEOPLE: SOMEWHAT DIFFICULT
6. BECOMING EASILY ANNOYED OR IRRITABLE: SEVERAL DAYS
GAD7 TOTAL SCORE: 5
7. FEELING AFRAID AS IF SOMETHING AWFUL MIGHT HAPPEN: SEVERAL DAYS
GAD7 TOTAL SCORE: 5
3. WORRYING TOO MUCH ABOUT DIFFERENT THINGS: SEVERAL DAYS
8. IF YOU CHECKED OFF ANY PROBLEMS, HOW DIFFICULT HAVE THESE MADE IT FOR YOU TO DO YOUR WORK, TAKE CARE OF THINGS AT HOME, OR GET ALONG WITH OTHER PEOPLE?: SOMEWHAT DIFFICULT

## 2025-05-15 ENCOUNTER — OFFICE VISIT (OUTPATIENT)
Dept: FAMILY MEDICINE | Facility: CLINIC | Age: 35
End: 2025-05-15
Payer: COMMERCIAL

## 2025-05-15 ENCOUNTER — RESULTS FOLLOW-UP (OUTPATIENT)
Dept: FAMILY MEDICINE | Facility: CLINIC | Age: 35
End: 2025-05-15

## 2025-05-15 ENCOUNTER — MYC MEDICAL ADVICE (OUTPATIENT)
Dept: FAMILY MEDICINE | Facility: CLINIC | Age: 35
End: 2025-05-15

## 2025-05-15 VITALS
SYSTOLIC BLOOD PRESSURE: 148 MMHG | DIASTOLIC BLOOD PRESSURE: 97 MMHG | BODY MASS INDEX: 29.33 KG/M2 | TEMPERATURE: 97.9 F | HEIGHT: 69 IN | HEART RATE: 86 BPM | RESPIRATION RATE: 16 BRPM | OXYGEN SATURATION: 98 % | WEIGHT: 198 LBS

## 2025-05-15 DIAGNOSIS — F41.9 ANXIETY: ICD-10-CM

## 2025-05-15 DIAGNOSIS — Z00.00 ROUTINE GENERAL MEDICAL EXAMINATION AT A HEALTH CARE FACILITY: Primary | ICD-10-CM

## 2025-05-15 DIAGNOSIS — I10 HYPERTENSION GOAL BP (BLOOD PRESSURE) < 140/90: ICD-10-CM

## 2025-05-15 DIAGNOSIS — Z83.49 FAMILY HISTORY OF HYPOTHYROIDISM: ICD-10-CM

## 2025-05-15 DIAGNOSIS — Z13.1 SCREENING FOR DIABETES MELLITUS: ICD-10-CM

## 2025-05-15 DIAGNOSIS — Z12.4 CERVICAL CANCER SCREENING: ICD-10-CM

## 2025-05-15 DIAGNOSIS — Z30.09 FAMILY PLANNING: ICD-10-CM

## 2025-05-15 DIAGNOSIS — K21.9 GASTROESOPHAGEAL REFLUX DISEASE WITHOUT ESOPHAGITIS: ICD-10-CM

## 2025-05-15 DIAGNOSIS — Z13.220 SCREENING FOR LIPID DISORDERS: ICD-10-CM

## 2025-05-15 LAB
ANION GAP SERPL CALCULATED.3IONS-SCNC: 13 MMOL/L (ref 7–15)
BUN SERPL-MCNC: 9.7 MG/DL (ref 6–20)
CALCIUM SERPL-MCNC: 9.3 MG/DL (ref 8.8–10.4)
CHLORIDE SERPL-SCNC: 104 MMOL/L (ref 98–107)
CHOLEST SERPL-MCNC: 233 MG/DL
CREAT SERPL-MCNC: 0.65 MG/DL (ref 0.51–0.95)
EGFRCR SERPLBLD CKD-EPI 2021: >90 ML/MIN/1.73M2
EST. AVERAGE GLUCOSE BLD GHB EST-MCNC: 108 MG/DL
FASTING STATUS PATIENT QL REPORTED: YES
FASTING STATUS PATIENT QL REPORTED: YES
GLUCOSE SERPL-MCNC: 99 MG/DL (ref 70–99)
HBA1C MFR BLD: 5.4 % (ref 0–5.6)
HCO3 SERPL-SCNC: 22 MMOL/L (ref 22–29)
HDLC SERPL-MCNC: 59 MG/DL
HPV HR 12 DNA CVX QL NAA+PROBE: NEGATIVE
HPV16 DNA CVX QL NAA+PROBE: NEGATIVE
HPV18 DNA CVX QL NAA+PROBE: NEGATIVE
HUMAN PAPILLOMA VIRUS FINAL DIAGNOSIS: NORMAL
LDLC SERPL CALC-MCNC: 159 MG/DL
NONHDLC SERPL-MCNC: 174 MG/DL
POTASSIUM SERPL-SCNC: 4 MMOL/L (ref 3.4–5.3)
SODIUM SERPL-SCNC: 139 MMOL/L (ref 135–145)
TRIGL SERPL-MCNC: 77 MG/DL
TSH SERPL DL<=0.005 MIU/L-ACNC: 2.69 UIU/ML (ref 0.3–4.2)
VIT B12 SERPL-MCNC: 575 PG/ML (ref 232–1245)

## 2025-05-15 RX ORDER — OMEPRAZOLE 20 MG/1
20 CAPSULE, DELAYED RELEASE ORAL DAILY
Qty: 90 CAPSULE | Refills: 3 | Status: SHIPPED | OUTPATIENT
Start: 2025-05-15

## 2025-05-15 NOTE — TELEPHONE ENCOUNTER
A.S.,  Please see below MyChart message ESTUARDO  Added BP reading as pt reported vitals  Thanks,  Hayley TEJEDA RN

## 2025-05-15 NOTE — PROGRESS NOTES
Preventive Care Visit  [facility]  Serina Munguia MD, Family Medicine  May 15, 2025      Assessment & Plan     1. Routine general medical examination at a health care facility (Primary)      2. Cervical cancer screening  If negative human pappiloma virus, ok to repeat in 3 yrs  - HPV and Gynecologic Cytology Panel - Recommended Age 30 - 65 Years    3. Screening for diabetes mellitus  - Hemoglobin A1c; Future    4. Screening for lipid disorders  Fasting for labs   - Lipid panel reflex to direct LDL Fasting; Future    5. Family planning  Change of oral contraceptive pills from POP to Combined  - norgestrel-ethinyl estradiol (LO/OVRAL) 0.3-30 MG-MCG tablet; Take 1 tablet by mouth daily.  Dispense: 84 tablet; Refill: 3    6. Anxiety  Stable on lexapro 20 mg once daily. Refill sent for the year in 3/2025      7/27/2023     6:17 AM 3/14/2024     6:53 AM 5/14/2025     2:11 PM   ANNEMARIE-7 SCORE   Total Score 6 (mild anxiety) 11 (moderate anxiety) 5 (mild anxiety)   Total Score 6 11 5        Patient-reported        7. Hypertension goal BP (blood pressure) < 140/90  Plan: Blood pressure is high in clinic- she will send my chart with Up to date  Blood pressure at home.  She believes its always normal.  Continue same dose of nifedipine for now. May benefit from  change of medications if Blood pressure not controlled.  Most likely will change to an ace  - BASIC METABOLIC PANEL; Future    8. Gastroesophageal reflux disease without esophagitis  Plan: encouraged to avoid proton pump inhibitors due to side effects    - omeprazole (PRILOSEC) 20 MG DR capsule; Take 1 capsule (20 mg) by mouth daily.  Dispense: 90 capsule; Refill: 3  - Vitamin B12; Future    9. Family history of hypothyroidism  - TSH with free T4 reflex; Future      Patient has been advised of split billing requirements and indicates understanding: Yes        BMI  Estimated body mass index is 29.24 kg/m  as calculated from the following:    Height  "as of this encounter: 1.753 m (5' 9\").    Weight as of this encounter: 89.8 kg (198 lb).   Weight management plan: Discussed healthy diet and exercise guidelines    Counseling  Appropriate preventive services were addressed with this patient via screening, questionnaire, or discussion as appropriate for fall prevention, nutrition, physical activity, Tobacco-use cessation, social engagement, weight loss and cognition.  Checklist reviewing preventive services available has been given to the patient.  Reviewed patient's diet, addressing concerns and/or questions.   The patient's PHQ-9 score is consistent with mild depression. She was provided with information regarding depression.       Follow-up    Follow-up Visit   Expected date:  May 15, 2026 (Approximate)      Follow Up Appointment Details:     Follow-up with whom?: PCP    Follow-Up for what?: Adult Preventive    How?: In Person                 Yan Cota is a 34 year old, presenting for the following:  Physical        5/15/2025     6:58 AM   Additional Questions   Roomed by Ambar CABA MA   Accompanied by self         5/15/2025     6:58 AM   Patient Reported Additional Medications   Patient reports taking the following new medications none          HPI     Blood pressure at home systolic in 120's  Diastolic in 70-80, never as high as in clinic  Has been taking nifedipine- and wants to keep same dose- higher dose caused bradycardia, and that hppeneded with labetolol as well      Is fasting for labs  Family history of hypothyroidism  Only spoting on oral contraceptive pills- not regular periods for past 2 months , wondering if low in thyroid.    Oral contraceptive pills refill  Not nursing, change of oral contraceptive pills ok    Advance Care Planning            5/10/2025   General Health   How would you rate your overall physical health? Good   Feel stress (tense, anxious, or unable to sleep) Only a little   (!) STRESS CONCERN      5/10/2025   Nutrition   Three " or more servings of calcium each day? Yes   Diet: Regular (no restrictions)   How many servings of fruit and vegetables per day? (!) 2-3   How many sweetened beverages each day? 0-1         5/10/2025   Exercise   Days per week of moderate/strenous exercise 4 days   Average minutes spent exercising at this level 30 min         5/10/2025   Social Factors   Frequency of gathering with friends or relatives Once a week   Worry food won't last until get money to buy more No   Food not last or not have enough money for food? No   Do you have housing? (Housing is defined as stable permanent housing and does not include staying outside in a car, in a tent, in an abandoned building, in an overnight shelter, or couch-surfing.) Yes   Are you worried about losing your housing? No   Lack of transportation? No   Unable to get utilities (heat,electricity)? No         5/10/2025   Dental   Dentist two times every year? Yes       Today's PHQ-9 Score:       5/14/2025     2:10 PM   PHQ-9 SCORE   PHQ-9 Total Score MyChart 7 (Mild depression)   PHQ-9 Total Score 7        Patient-reported         5/10/2025   Substance Use   Alcohol more than 3/day or more than 7/wk No   Do you use any other substances recreationally? No     Social History     Tobacco Use    Smoking status: Never     Passive exposure: Never    Smokeless tobacco: Never   Vaping Use    Vaping status: Never Used   Substance Use Topics    Alcohol use: Yes    Drug use: No          Mammogram Screening - Patient under 40 years of age: Routine Mammogram Screening not recommended.         5/10/2025   STI Screening   New sexual partner(s) since last STI/HIV test? No     History of abnormal Pap smear: No - age 30- 64 PAP with HPV every 5 years recommended        Latest Ref Rng & Units 6/13/2022     1:18 PM 6/9/2021    10:59 AM 7/15/2020     1:21 PM   PAP / HPV   PAP  Negative for Intraepithelial Lesion or Malignancy (NILM)      PAP (Historical)   ASC-US  NIL    HPV 16 DNA Negative  "Negative  Negative  Negative    HPV 18 DNA Negative Negative  Negative  Negative    Other HR HPV Negative Negative  Positive  Positive            5/10/2025   Contraception/Family Planning   Questions about contraception or family planning No        Reviewed and updated as needed this visit by Provider   Tobacco  Allergies  Meds  Problems  Med Hx  Surg Hx  Fam Hx            BP Readings from Last 3 Encounters:   05/15/25 (!) 148/97   01/27/25 (!) 147/94   12/31/24 125/86    Wt Readings from Last 3 Encounters:   05/15/25 89.8 kg (198 lb)   01/27/25 89.3 kg (196 lb 12.8 oz)   12/31/24 86.2 kg (190 lb)                      Review of Systems  Constitutional, HEENT, cardiovascular, pulmonary, GI, , musculoskeletal, neuro, skin, endocrine and psych systems are negative, except as otherwise noted.     Objective    Exam  BP (!) 148/97 (BP Location: Left arm, Patient Position: Sitting, Cuff Size: Adult Regular)   Pulse 86   Temp 97.9  F (36.6  C) (Temporal)   Resp 16   Ht 1.753 m (5' 9\")   Wt 89.8 kg (198 lb)   SpO2 98%   BMI 29.24 kg/m     Estimated body mass index is 29.24 kg/m  as calculated from the following:    Height as of this encounter: 1.753 m (5' 9\").    Weight as of this encounter: 89.8 kg (198 lb).    Physical Exam  GENERAL: alert and no distress  EYES: Eyes grossly normal to inspection, PERRL and conjunctivae and sclerae normal  HENT: ear canals and TM's normal, nose and mouth without ulcers or lesions  NECK: no adenopathy, no asymmetry, masses, or scars  RESP: lungs clear to auscultation - no rales, rhonchi or wheezes  BREAST: normal without masses, tenderness or nipple discharge and no palpable axillary masses or adenopathy  CV: regular rate and rhythm, normal S1 S2, no S3 or S4, no murmur, click or rub, no peripheral edema  ABDOMEN: soft, nontender, no hepatosplenomegaly, no masses and bowel sounds normal   (female): normal female external genitalia, normal urethral meatus, normal vaginal " mucosa  MS: no gross musculoskeletal defects noted, no edema  SKIN: no suspicious lesions or rashes  NEURO: Normal strength and tone, mentation intact and speech normal  PSYCH: mentation appears normal, affect normal/bright        Signed Electronically by: Serina Munguia MD    Answers submitted by the patient for this visit:  Patient Health Questionnaire (Submitted on 5/14/2025)  If you checked off any problems, how difficult have these problems made it for you to do your work, take care of things at home, or get along with other people?: Somewhat difficult  PHQ9 TOTAL SCORE: 7  Patient Health Questionnaire (G7) (Submitted on 5/14/2025)  ANNEMARIE 7 TOTAL SCORE: 5

## 2025-05-15 NOTE — PATIENT INSTRUCTIONS
Patient Education   Preventive Care Advice   This is general advice given by our system to help you stay healthy. However, your care team may have specific advice just for you. Please talk to your care team about your preventive care needs.  Nutrition  Eat 5 or more servings of fruits and vegetables each day.  Try wheat bread, brown rice and whole grain pasta (instead of white bread, rice, and pasta).  Get enough calcium and vitamin D. Check the label on foods and aim for 100% of the RDA (recommended daily allowance).  Lifestyle  Exercise at least 150 minutes each week  (30 minutes a day, 5 days a week).  Do muscle strengthening activities 2 days a week. These help control your weight and prevent disease.  No smoking.  Wear sunscreen to prevent skin cancer.  Have a dental exam and cleaning every 6 months.  Yearly exams  See your health care team every year to talk about:  Any changes in your health.  Any medicines your care team has prescribed.  Preventive care, family planning, and ways to prevent chronic diseases.  Shots (vaccines)   HPV shots (up to age 26), if you've never had them before.  Hepatitis B shots (up to age 59), if you've never had them before.  COVID-19 shot: Get this shot when it's due.  Flu shot: Get a flu shot every year.  Tetanus shot: Get a tetanus shot every 10 years.  Pneumococcal, hepatitis A, and RSV shots: Ask your care team if you need these based on your risk.  Shingles shot (for age 50 and up)  General health tests  Diabetes screening:  Starting at age 35, Get screened for diabetes at least every 3 years.  If you are younger than age 35, ask your care team if you should be screened for diabetes.  Cholesterol test: At age 39, start having a cholesterol test every 5 years, or more often if advised.  Bone density scan (DEXA): At age 50, ask your care team if you should have this scan for osteoporosis (brittle bones).  Hepatitis C: Get tested at least once in your life.  STIs (sexually  transmitted infections)  Before age 24: Ask your care team if you should be screened for STIs.  After age 24: Get screened for STIs if you're at risk. You are at risk for STIs (including HIV) if:  You are sexually active with more than one person.  You don't use condoms every time.  You or a partner was diagnosed with a sexually transmitted infection.  If you are at risk for HIV, ask about PrEP medicine to prevent HIV.  Get tested for HIV at least once in your life, whether you are at risk for HIV or not.  Cancer screening tests  Cervical cancer screening: If you have a cervix, begin getting regular cervical cancer screening tests starting at age 21.  Breast cancer scan (mammogram): If you've ever had breasts, begin having regular mammograms starting at age 40. This is a scan to check for breast cancer.  Colon cancer screening: It is important to start screening for colon cancer at age 45.  Have a colonoscopy test every 10 years (or more often if you're at risk) Or, ask your provider about stool tests like a FIT test every year or Cologuard test every 3 years.  To learn more about your testing options, visit:   .  For help making a decision, visit:   https://bit.ly/vs84868.  Prostate cancer screening test: If you have a prostate, ask your care team if a prostate cancer screening test (PSA) at age 55 is right for you.  Lung cancer screening: If you are a current or former smoker ages 50 to 80, ask your care team if ongoing lung cancer screenings are right for you.  For informational purposes only. Not to replace the advice of your health care provider. Copyright   2023 [facility]. All rights reserved. Clinically reviewed by the [facility] Transitions Program. shopa [id number] - REV 01/24.  Learning About Depression Screening  What is depression screening?  Depression screening is a way to see if you have depression symptoms. It may be done by a doctor or counselor. It's often part of a routine  "checkup. That's because your mental health is just as important as your physical health.  Depression is a mental health condition that affects how you feel, think, and act. You may:  Have less energy.  Lose interest in your daily activities.  Feel sad and grouchy for a long time.  Depression is very common. It affects people of all ages.  Many things can lead to depression. Some people become depressed after they have a stroke or find out they have a major illness like cancer or heart disease. The death of a loved one or a breakup may lead to depression. It can run in families. Most experts believe that a combination of inherited genes and stressful life events can cause it.  What happens during screening?  You may be asked to fill out a form about your depression symptoms. You and the doctor will discuss your answers. The doctor may ask you more questions to learn more about how you think, act, and feel.  What happens after screening?  If you have symptoms of depression, your doctor will talk to you about your options.  Doctors usually treat depression with medicines or counseling. Often, combining the two works best. Many people don't get help because they think that they'll get over the depression on their own. But people with depression may not get better unless they get treatment.  The cause of depression is not well understood. There may be many factors involved. But if you have depression, it's not your fault.  A serious symptom of depression is thinking about death or suicide. If you or someone you care about talks about this or about feeling hopeless, get help right away.  It's important to know that depression can be treated. Medicine, counseling, and self-care may help.  Where can you learn more?  Go to https://www.healthwise.net/patiented  Enter T185 in the search box to learn more about \"Learning About Depression Screening.\"  Current as of: July 31, 2024  Content Version: 14.4    [id number] Samuel " [employer].   Care instructions adapted under license by your healthcare professional. If you have questions about a medical condition or this instruction, always ask your healthcare professional. [facility] [employer] disclaims any warranty or liability for your use of this information.

## 2025-05-19 LAB
BKR AP ASSOCIATED HPV REPORT: NORMAL
BKR LAB AP GYN ADEQUACY: NORMAL
BKR LAB AP GYN INTERPRETATION: NORMAL
BKR LAB AP PREVIOUS ABNORMAL: NORMAL
PATH REPORT.COMMENTS IMP SPEC: NORMAL
PATH REPORT.COMMENTS IMP SPEC: NORMAL
PATH REPORT.RELEVANT HX SPEC: NORMAL

## 2025-05-20 PROBLEM — R87.610 ASCUS WITH POSITIVE HIGH RISK HPV CERVICAL: Status: ACTIVE | Noted: 2018-05-16

## 2025-05-20 PROBLEM — R87.810 ASCUS WITH POSITIVE HIGH RISK HPV CERVICAL: Status: ACTIVE | Noted: 2018-05-16

## 2025-05-21 NOTE — TELEPHONE ENCOUNTER
Health Maintenance       COVID-19 Vaccine (4 - 2024-25 season)  Overdue since 9/1/2024    Pneumococcal Vaccine 50+ (1 of 1 - PCV)  Never done           Following review of the above:  Patient is not proceeding with: COVID-19 and Pneumococcal    Note: Refer to final orders and clinician documentation.         Agree with RN recommendations, all BPs are WNL so do not continue taking labetalol. Take BP 2x/day or if symptomatic for preeclampsia. If BPs persistently 150s/100s then she should notify us.     ALEJO Bazzi, MADELINEM

## 2025-06-30 DIAGNOSIS — Z30.09 FAMILY PLANNING: ICD-10-CM

## 2025-06-30 RX ORDER — NORETHINDRONE 0.35 MG/1
0.35 TABLET ORAL DAILY
Qty: 84 TABLET | Refills: 2 | Status: SHIPPED | OUTPATIENT
Start: 2025-06-30

## 2025-07-02 ENCOUNTER — TELEPHONE (OUTPATIENT)
Dept: FAMILY MEDICINE | Facility: CLINIC | Age: 35
End: 2025-07-02
Payer: COMMERCIAL

## 2025-07-02 NOTE — TELEPHONE ENCOUNTER
Forms/Letter Request    Type of form/letter:     Reason for clarification: It appears the patient is either taking both medication, or going back and forth between the two.    Need clarification of Norethindron or Cryselle     Do we have the form/letter: Yes    Who is the form from?   [facility]    Where did/will the form come from? form was faxed in    When is form/letter needed by: asap    How would you like the form/letter returned:   Fax: [phone]    Patient Notified form requests are processed in 5-7 business days:N/A    Could we send this information to you in [employer] or would you prefer to receive a phone call?:   n/a    Placed in Dr. Munguia's box.

## 2025-07-02 NOTE — TELEPHONE ENCOUNTER
Dear Triage, Please review and clarify from  patient- her needs  She has been prescribed in annual physical combination oral contraceptive pills  norgestrel-ethinyl estradiol (LO/OVRAL) 0.3-30 MG-MCG tablet; Take 1 tablet by mouth daily. Dispense: 84 tablet; Refill: 3     Then progesterone only pill was sent on 6/30/25.    She should not be on both.      I am unable to find her on her given phone number, I tried just now.  Thanks

## 2025-07-03 NOTE — TELEPHONE ENCOUNTER
A.S.,    Patient NOT taking norethindrone (Jencycla)     Patient is currently taking norgestrel-ethinyl estradiol (Cryselle)    Thanks,  Kristen CUENCA RN

## 2025-07-03 NOTE — TELEPHONE ENCOUNTER
Left message for patient to call [facility] back  When patient calls back please transfer to an RN  Thanks,  Kristen CUENCA RN

## 2025-07-07 NOTE — TELEPHONE ENCOUNTER
Perfect- she has refill for generic/ Combined oral pills- norgestrel-ethinyl estradiol - I do not think further action required

## (undated) DEVICE — LINEN BABY BLANKET 5434

## (undated) DEVICE — SUCTION CANISTER MEDIVAC LINER 3000ML W/LID 65651-530

## (undated) DEVICE — LINEN TOWEL PACK X5 5464

## (undated) DEVICE — PAD CHUX UNDERPAD 23X24" 7136

## (undated) DEVICE — STPL SKIN PROXIMATE 35 WIDE PMW35

## (undated) DEVICE — SOL NACL 0.9% IRRIG 1000ML BOTTLE 07138-09

## (undated) DEVICE — LIGHT HANDLE X2

## (undated) DEVICE — PACK C-SECTION LF PL15OTA83B

## (undated) DEVICE — DRAPE SHEET REV FOLD 3/4 9349

## (undated) DEVICE — BLADE CLIPPER 4406

## (undated) DEVICE — SOL WATER IRRIG 1000ML BOTTLE 07139-09

## (undated) DEVICE — ESU GROUND PAD UNIVERSAL W/O CORD

## (undated) DEVICE — PREP CHLORAPREP 26ML TINTED ORANGE  260815

## (undated) DEVICE — DRSG KERLIX FLUFFS X5

## (undated) DEVICE — PACK SET-UP STD 9102

## (undated) RX ORDER — MORPHINE SULFATE 1 MG/ML
INJECTION, SOLUTION EPIDURAL; INTRATHECAL; INTRAVENOUS
Status: DISPENSED
Start: 2023-02-20

## (undated) RX ORDER — LIDOCAINE HCL/EPINEPHRINE/PF 2%-1:200K
VIAL (ML) INJECTION
Status: DISPENSED
Start: 2023-02-20